# Patient Record
Sex: FEMALE | Race: WHITE | NOT HISPANIC OR LATINO | Employment: OTHER | URBAN - METROPOLITAN AREA
[De-identification: names, ages, dates, MRNs, and addresses within clinical notes are randomized per-mention and may not be internally consistent; named-entity substitution may affect disease eponyms.]

---

## 2018-03-26 ENCOUNTER — OFFICE VISIT (OUTPATIENT)
Dept: AUDIOLOGY | Facility: CLINIC | Age: 83
End: 2018-03-26
Payer: MEDICARE

## 2018-03-26 DIAGNOSIS — H93.13 TINNITUS, BILATERAL: ICD-10-CM

## 2018-03-26 DIAGNOSIS — H90.3 SENSORINEURAL HEARING LOSS, BILATERAL: Primary | ICD-10-CM

## 2018-03-26 PROCEDURE — 92567 TYMPANOMETRY: CPT | Performed by: AUDIOLOGIST

## 2018-03-26 PROCEDURE — 92557 COMPREHENSIVE HEARING TEST: CPT | Performed by: AUDIOLOGIST

## 2018-03-27 NOTE — PROGRESS NOTES
AUDIOLOGY AUDIOMETRIC EVALUATION      Name:  Estefanía Crews  :  1934  Age:  80 y o  Date of Evaluation: 3/26/18    History: Tinnitus, Dizziness and noise exposure (work related)   Reason for visit: Estefanía Crews is being seen today at the request of Dr Michela Chappell for an evaluation of hearing  Patient reports difficulty hearing in certain communication situations and while on the phone  She has a history of tinnitus and vertigo  She takes Meclizine when needed for dizziness; usually lasting a few days per episode  EVALUATION:    Otoscopic Evaluation:   Right Ear: Clear and healthy ear canal and tympanic membrane   Left Ear: Clear and healthy ear canal and tympanic membrane    Tympanometry:  See attached/ both TM's stiff in nature     Audiogram Results: Moderate to severe SNHL bilaterally with excellent word recognition scores (100% at most comfortable loudness levels)  *see attached audiogram    RECOMMENDATIONS:  1  Hearing aid demo/trial   Provided information regarding technology levels, styles and price points  She  wanted to read the information provided and discuss further with her   2  Audiological evaluation in one year for monitoring purposes  3  Consider referral to the [address] [place] [address] due to episodic vertigo/dizziness    PATIENT EDUCATION:   Discussed results and recommendations with Mr  and Mrs Gupta [facility]  Questions were addressed and the patient was encouraged to contact our department should concerns arise      Anais Tse , CCC-A, [place]# [id number], Hearing Aid Dispenser, [place]# [id number]  Clinical Audiologist

## 2018-04-23 ENCOUNTER — TRANSCRIBE ORDERS (OUTPATIENT)
Dept: ADMINISTRATIVE | Facility: HOSPITAL | Age: 83
End: 2018-04-23

## 2018-04-23 ENCOUNTER — APPOINTMENT (OUTPATIENT)
Dept: LAB | Facility: HOSPITAL | Age: 83
End: 2018-04-23
Attending: INTERNAL MEDICINE
Payer: MEDICARE

## 2018-04-23 ENCOUNTER — HOSPITAL ENCOUNTER (OUTPATIENT)
Dept: RADIOLOGY | Facility: HOSPITAL | Age: 83
Discharge: HOME | End: 2018-04-23
Attending: INTERNAL MEDICINE
Payer: MEDICARE

## 2018-04-23 DIAGNOSIS — R10.32 ABDOMINAL PAIN, LEFT LOWER QUADRANT: Primary | ICD-10-CM

## 2018-04-23 DIAGNOSIS — R10.32 LLQ ABDOMINAL PAIN: Primary | ICD-10-CM

## 2018-04-23 DIAGNOSIS — R10.32 ABDOMINAL PAIN, LEFT LOWER QUADRANT: ICD-10-CM

## 2018-04-23 DIAGNOSIS — R53.83 OTHER FATIGUE: ICD-10-CM

## 2018-04-23 DIAGNOSIS — Z79.899 NEED FOR PROPHYLACTIC CHEMOTHERAPY: ICD-10-CM

## 2018-04-23 DIAGNOSIS — R10.32 LLQ ABDOMINAL PAIN: ICD-10-CM

## 2018-04-23 LAB
ALBUMIN SERPL BCP-MCNC: 3.6 G/DL (ref 3.5–5)
ALP SERPL-CCNC: 74 U/L (ref 46–116)
ALT SERPL W P-5'-P-CCNC: 21 U/L (ref 12–78)
AMYLASE SERPL-CCNC: 76 IU/L (ref 25–115)
ANION GAP SERPL CALCULATED.3IONS-SCNC: 4 MMOL/L (ref 4–13)
AST SERPL W P-5'-P-CCNC: 20 U/L (ref 5–45)
BASOPHILS # BLD AUTO: 0.1 THOUSANDS/ΜL (ref 0–0.1)
BASOPHILS NFR BLD AUTO: 2 % (ref 0–1)
BILIRUB SERPL-MCNC: 0.3 MG/DL (ref 0.2–1)
BUN SERPL-MCNC: 19 MG/DL (ref 5–25)
CALCIUM SERPL-MCNC: 9.5 MG/DL (ref 8.3–10.1)
CHLORIDE SERPL-SCNC: 106 MMOL/L (ref 100–108)
CO2 SERPL-SCNC: 31 MMOL/L (ref 21–32)
CREAT SERPL-MCNC: 0.85 MG/DL (ref 0.6–1.3)
EOSINOPHIL # BLD AUTO: 0.5 THOUSAND/ΜL (ref 0–0.61)
EOSINOPHIL NFR BLD AUTO: 7 % (ref 0–6)
ERYTHROCYTE [DISTWIDTH] IN BLOOD BY AUTOMATED COUNT: 14.8 % (ref 11.6–15.1)
GFR SERPL CREATININE-BSD FRML MDRD: 63 ML/MIN/1.73SQ M
GLUCOSE SERPL-MCNC: 92 MG/DL (ref 65–140)
HCT VFR BLD AUTO: 39.7 % (ref 37–47)
HGB BLD-MCNC: 12.7 G/DL (ref 12–16)
LIPASE SERPL-CCNC: 108 U/L (ref 73–393)
LYMPHOCYTES # BLD AUTO: 2.2 THOUSANDS/ΜL (ref 0.6–4.47)
LYMPHOCYTES NFR BLD AUTO: 30 % (ref 14–44)
MAGNESIUM SERPL-MCNC: 2 MG/DL (ref 1.6–2.6)
MCH RBC QN AUTO: 31.3 PG (ref 27–31)
MCHC RBC AUTO-ENTMCNC: 32 G/DL (ref 31.4–37.4)
MCV RBC AUTO: 98 FL (ref 82–98)
MONOCYTES # BLD AUTO: 0.5 THOUSAND/ΜL (ref 0.17–1.22)
MONOCYTES NFR BLD AUTO: 6 % (ref 4–12)
NEUTROPHILS # BLD AUTO: 4 THOUSANDS/ΜL (ref 1.85–7.62)
NEUTS SEG NFR BLD AUTO: 55 % (ref 43–75)
NRBC BLD AUTO-RTO: 0 /100 WBCS
PLATELET # BLD AUTO: 332 THOUSANDS/UL (ref 130–400)
PMV BLD AUTO: 10 FL (ref 8.9–12.7)
POTASSIUM SERPL-SCNC: 4.3 MMOL/L (ref 3.5–5.3)
PROT SERPL-MCNC: 7.4 G/DL (ref 6.4–8.2)
RBC # BLD AUTO: 4.06 MILLION/UL (ref 4.2–5.4)
SODIUM SERPL-SCNC: 141 MMOL/L (ref 136–145)
WBC # BLD AUTO: 7.3 THOUSAND/UL (ref 4.8–10.8)

## 2018-04-23 PROCEDURE — 82150 ASSAY OF AMYLASE: CPT

## 2018-04-23 PROCEDURE — 87086 URINE CULTURE/COLONY COUNT: CPT

## 2018-04-23 PROCEDURE — 83690 ASSAY OF LIPASE: CPT

## 2018-04-23 PROCEDURE — 85025 COMPLETE CBC W/AUTO DIFF WBC: CPT

## 2018-04-23 PROCEDURE — 83735 ASSAY OF MAGNESIUM: CPT

## 2018-04-23 PROCEDURE — 80053 COMPREHEN METABOLIC PANEL: CPT

## 2018-04-23 PROCEDURE — 36415 COLL VENOUS BLD VENIPUNCTURE: CPT

## 2018-04-23 PROCEDURE — 74176 CT ABD & PELVIS W/O CONTRAST: CPT

## 2018-04-23 PROCEDURE — 87147 CULTURE TYPE IMMUNOLOGIC: CPT

## 2018-04-25 LAB — BACTERIA UR CULT: ABNORMAL

## 2018-05-07 ENCOUNTER — APPOINTMENT (EMERGENCY)
Dept: RADIOLOGY | Facility: HOSPITAL | Age: 83
End: 2018-05-07
Payer: MEDICARE

## 2018-05-07 ENCOUNTER — HOSPITAL ENCOUNTER (OUTPATIENT)
Facility: HOSPITAL | Age: 83
Setting detail: OBSERVATION
Discharge: HOME | End: 2018-05-08
Attending: EMERGENCY MEDICINE | Admitting: EMERGENCY MEDICINE
Payer: MEDICARE

## 2018-05-07 ENCOUNTER — APPOINTMENT (EMERGENCY)
Dept: RADIOLOGY | Facility: HOSPITAL | Age: 83
End: 2018-05-07
Attending: EMERGENCY MEDICINE
Payer: MEDICARE

## 2018-05-07 DIAGNOSIS — M54.2 NECK PAIN ON LEFT SIDE: ICD-10-CM

## 2018-05-07 DIAGNOSIS — I10 HYPERTENSION, UNSPECIFIED TYPE: Primary | ICD-10-CM

## 2018-05-07 DIAGNOSIS — I16.0 HYPERTENSIVE URGENCY: ICD-10-CM

## 2018-05-07 DIAGNOSIS — I49.9 ARRHYTHMIA: ICD-10-CM

## 2018-05-07 PROBLEM — I49.3 PVCS (PREMATURE VENTRICULAR CONTRACTIONS): Status: ACTIVE | Noted: 2018-05-07

## 2018-05-07 PROBLEM — R52 ACUTE PAIN: Status: ACTIVE | Noted: 2018-05-07

## 2018-05-07 LAB
ALBUMIN SERPL BCP-MCNC: 3.6 G/DL (ref 3.5–5)
ALP SERPL-CCNC: 72 U/L (ref 46–116)
ALT SERPL W P-5'-P-CCNC: 20 U/L (ref 12–78)
ANION GAP SERPL CALCULATED.3IONS-SCNC: 6 MMOL/L (ref 4–13)
APTT PPP: 27 SECONDS (ref 24–33)
AST SERPL W P-5'-P-CCNC: 22 U/L (ref 5–45)
BASOPHILS # BLD AUTO: 0.2 THOUSANDS/ΜL (ref 0–0.1)
BASOPHILS NFR BLD AUTO: 3 % (ref 0–1)
BILIRUB SERPL-MCNC: 0.2 MG/DL (ref 0.2–1)
BUN SERPL-MCNC: 14 MG/DL (ref 5–25)
CALCIUM SERPL-MCNC: 8.9 MG/DL (ref 8.3–10.1)
CHLORIDE SERPL-SCNC: 104 MMOL/L (ref 100–108)
CO2 SERPL-SCNC: 30 MMOL/L (ref 21–32)
CREAT SERPL-MCNC: 0.91 MG/DL (ref 0.6–1.3)
DEPRECATED D DIMER PPP: 570 NG/ML (FEU) (ref 190–520)
EOSINOPHIL # BLD AUTO: 0.5 THOUSAND/ΜL (ref 0–0.61)
EOSINOPHIL NFR BLD AUTO: 6 % (ref 0–6)
ERYTHROCYTE [DISTWIDTH] IN BLOOD BY AUTOMATED COUNT: 14.5 % (ref 11.6–15.1)
GFR SERPL CREATININE-BSD FRML MDRD: 58 ML/MIN/1.73SQ M
GLUCOSE SERPL-MCNC: 90 MG/DL (ref 65–140)
HCT VFR BLD AUTO: 39.8 % (ref 37–47)
HGB BLD-MCNC: 12.9 G/DL (ref 12–16)
INR PPP: 1.01 (ref 0.86–1.16)
LYMPHOCYTES # BLD AUTO: 1.8 THOUSANDS/ΜL (ref 0.6–4.47)
LYMPHOCYTES NFR BLD AUTO: 24 % (ref 14–44)
MAGNESIUM SERPL-MCNC: 1.9 MG/DL (ref 1.6–2.6)
MCH RBC QN AUTO: 31.4 PG (ref 27–31)
MCHC RBC AUTO-ENTMCNC: 32.5 G/DL (ref 31.4–37.4)
MCV RBC AUTO: 97 FL (ref 82–98)
MONOCYTES # BLD AUTO: 0.5 THOUSAND/ΜL (ref 0.17–1.22)
MONOCYTES NFR BLD AUTO: 7 % (ref 4–12)
NEUTROPHILS # BLD AUTO: 4.8 THOUSANDS/ΜL (ref 1.85–7.62)
NEUTS SEG NFR BLD AUTO: 61 % (ref 43–75)
NRBC BLD AUTO-RTO: 0 /100 WBCS
PLATELET # BLD AUTO: 317 THOUSANDS/UL (ref 130–400)
PMV BLD AUTO: 9.7 FL (ref 8.9–12.7)
POTASSIUM SERPL-SCNC: 3.8 MMOL/L (ref 3.5–5.3)
PROT SERPL-MCNC: 7.4 G/DL (ref 6.4–8.2)
PROTHROMBIN TIME: 10.6 SECONDS (ref 9.4–11.7)
RBC # BLD AUTO: 4.11 MILLION/UL (ref 4.2–5.4)
SODIUM SERPL-SCNC: 140 MMOL/L (ref 136–145)
TROPONIN I SERPL-MCNC: <0.02 NG/ML
WBC # BLD AUTO: 7.8 THOUSAND/UL (ref 4.8–10.8)

## 2018-05-07 PROCEDURE — 71045 X-RAY EXAM CHEST 1 VIEW: CPT

## 2018-05-07 PROCEDURE — 93005 ELECTROCARDIOGRAM TRACING: CPT | Performed by: EMERGENCY MEDICINE

## 2018-05-07 PROCEDURE — 99285 EMERGENCY DEPT VISIT HI MDM: CPT

## 2018-05-07 PROCEDURE — 85730 THROMBOPLASTIN TIME PARTIAL: CPT | Performed by: EMERGENCY MEDICINE

## 2018-05-07 PROCEDURE — 83735 ASSAY OF MAGNESIUM: CPT | Performed by: NURSE PRACTITIONER

## 2018-05-07 PROCEDURE — 80053 COMPREHEN METABOLIC PANEL: CPT | Performed by: EMERGENCY MEDICINE

## 2018-05-07 PROCEDURE — 93882 EXTRACRANIAL UNI/LTD STUDY: CPT

## 2018-05-07 PROCEDURE — 99219 PR INITIAL OBSERVATION CARE/DAY 50 MINUTES: CPT | Performed by: NURSE PRACTITIONER

## 2018-05-07 PROCEDURE — 36415 COLL VENOUS BLD VENIPUNCTURE: CPT | Performed by: EMERGENCY MEDICINE

## 2018-05-07 PROCEDURE — 96374 THER/PROPH/DIAG INJ IV PUSH: CPT

## 2018-05-07 PROCEDURE — 85025 COMPLETE CBC W/AUTO DIFF WBC: CPT | Performed by: EMERGENCY MEDICINE

## 2018-05-07 PROCEDURE — 85379 FIBRIN DEGRADATION QUANT: CPT | Performed by: EMERGENCY MEDICINE

## 2018-05-07 PROCEDURE — 84484 ASSAY OF TROPONIN QUANT: CPT | Performed by: EMERGENCY MEDICINE

## 2018-05-07 PROCEDURE — 85610 PROTHROMBIN TIME: CPT | Performed by: EMERGENCY MEDICINE

## 2018-05-07 RX ORDER — HYDRALAZINE HYDROCHLORIDE 20 MG/ML
10 INJECTION INTRAMUSCULAR; INTRAVENOUS ONCE
Status: COMPLETED | OUTPATIENT
Start: 2018-05-07 | End: 2018-05-07

## 2018-05-07 RX ORDER — DIAPER,BRIEF,INFANT-TODD,DISP
EACH MISCELLANEOUS 2 TIMES DAILY
Status: DISCONTINUED | OUTPATIENT
Start: 2018-05-07 | End: 2018-05-08 | Stop reason: HOSPADM

## 2018-05-07 RX ORDER — HYDRALAZINE HYDROCHLORIDE 20 MG/ML
20 INJECTION INTRAMUSCULAR; INTRAVENOUS ONCE
Status: DISCONTINUED | OUTPATIENT
Start: 2018-05-07 | End: 2018-05-07

## 2018-05-07 RX ORDER — ONDANSETRON 2 MG/ML
4 INJECTION INTRAMUSCULAR; INTRAVENOUS EVERY 6 HOURS PRN
Status: DISCONTINUED | OUTPATIENT
Start: 2018-05-07 | End: 2018-05-08 | Stop reason: HOSPADM

## 2018-05-07 RX ORDER — PANTOPRAZOLE SODIUM 40 MG/1
40 TABLET, DELAYED RELEASE ORAL
Status: DISCONTINUED | OUTPATIENT
Start: 2018-05-08 | End: 2018-05-08 | Stop reason: HOSPADM

## 2018-05-07 RX ORDER — METOPROLOL SUCCINATE 25 MG/1
25 TABLET, EXTENDED RELEASE ORAL 2 TIMES DAILY
Status: DISCONTINUED | OUTPATIENT
Start: 2018-05-08 | End: 2018-05-07

## 2018-05-07 RX ORDER — LEVOTHYROXINE SODIUM 0.05 MG/1
50 TABLET ORAL EVERY OTHER DAY
Status: ON HOLD | COMMUNITY
End: 2020-10-05 | Stop reason: SDUPTHER

## 2018-05-07 RX ORDER — DIAPER,BRIEF,INFANT-TODD,DISP
EACH MISCELLANEOUS 2 TIMES DAILY
Status: DISCONTINUED | OUTPATIENT
Start: 2018-05-08 | End: 2018-05-07

## 2018-05-07 RX ORDER — LEVOTHYROXINE SODIUM 0.07 MG/1
75 TABLET ORAL EVERY OTHER DAY
Status: DISCONTINUED | OUTPATIENT
Start: 2018-05-08 | End: 2018-05-08 | Stop reason: HOSPADM

## 2018-05-07 RX ORDER — ACETAMINOPHEN 325 MG/1
650 TABLET ORAL EVERY 6 HOURS PRN
Status: DISCONTINUED | OUTPATIENT
Start: 2018-05-07 | End: 2018-05-08 | Stop reason: HOSPADM

## 2018-05-07 RX ORDER — LISINOPRIL 20 MG/1
20 TABLET ORAL
Status: DISCONTINUED | OUTPATIENT
Start: 2018-05-07 | End: 2018-05-08 | Stop reason: HOSPADM

## 2018-05-07 RX ORDER — METOPROLOL SUCCINATE 25 MG/1
25 TABLET, EXTENDED RELEASE ORAL 2 TIMES DAILY
Status: DISCONTINUED | OUTPATIENT
Start: 2018-05-07 | End: 2018-05-08 | Stop reason: HOSPADM

## 2018-05-07 RX ORDER — LISINOPRIL 20 MG/1
20 TABLET ORAL DAILY
Status: DISCONTINUED | OUTPATIENT
Start: 2018-05-08 | End: 2018-05-07

## 2018-05-07 RX ORDER — LEVOTHYROXINE SODIUM 0.05 MG/1
50 TABLET ORAL EVERY OTHER DAY
Status: DISCONTINUED | OUTPATIENT
Start: 2018-05-09 | End: 2018-05-08 | Stop reason: HOSPADM

## 2018-05-07 RX ORDER — AMLODIPINE BESYLATE 5 MG/1
5 TABLET ORAL DAILY
Status: DISCONTINUED | OUTPATIENT
Start: 2018-05-07 | End: 2018-05-08 | Stop reason: HOSPADM

## 2018-05-07 RX ORDER — LORAZEPAM 0.5 MG/1
0.5 TABLET ORAL 2 TIMES DAILY PRN
Status: DISCONTINUED | OUTPATIENT
Start: 2018-05-07 | End: 2018-05-08 | Stop reason: HOSPADM

## 2018-05-07 RX ORDER — METOPROLOL SUCCINATE 25 MG/1
25 TABLET, EXTENDED RELEASE ORAL EVERY 12 HOURS
Status: DISCONTINUED | OUTPATIENT
Start: 2018-05-07 | End: 2018-05-07

## 2018-05-07 RX ORDER — OXYCODONE HYDROCHLORIDE AND ACETAMINOPHEN 5; 325 MG/1; MG/1
1 TABLET ORAL ONCE
Status: COMPLETED | OUTPATIENT
Start: 2018-05-07 | End: 2018-05-07

## 2018-05-07 RX ADMIN — HYDRALAZINE HYDROCHLORIDE 10 MG: 20 INJECTION INTRAMUSCULAR; INTRAVENOUS at 19:31

## 2018-05-07 RX ADMIN — HYDROCORTISONE: 10 CREAM TOPICAL at 23:39

## 2018-05-07 RX ADMIN — LORAZEPAM 0.5 MG: 0.5 TABLET ORAL at 23:37

## 2018-05-07 RX ADMIN — METOPROLOL SUCCINATE 25 MG: 25 TABLET, FILM COATED, EXTENDED RELEASE ORAL at 23:37

## 2018-05-07 RX ADMIN — ACETAMINOPHEN 650 MG: 325 TABLET, FILM COATED ORAL at 23:38

## 2018-05-07 RX ADMIN — LISINOPRIL 20 MG: 20 TABLET ORAL at 23:38

## 2018-05-07 RX ADMIN — OXYCODONE HYDROCHLORIDE AND ACETAMINOPHEN 1 TABLET: 5; 325 TABLET ORAL at 18:29

## 2018-05-07 RX ADMIN — AMLODIPINE BESYLATE 5 MG: 5 TABLET ORAL at 23:37

## 2018-05-07 NOTE — ED PROVIDER NOTES
History  Chief Complaint   Patient presents with    Neck Pain     c/o having an intermittant stabbing pain in left side of neck     Hx HTN, DOES NOT CHECK HER BP  C/O LT NECK INTERMITTENT PAIN AFTER WORKING IN THE YARD  EXAM: 237/105, POINT TENDERNESS AT THE BASE OF THE LEFT SIDE OF NECK POSTERIOR TO STERNO-CLDO-MSTOID MUSCLE ORIGIN AND POSTERIOR TO CAROTID  Neck Pain   Pain location:  L side  Quality:  Stabbing  Pain radiates to:  Does not radiate  Duration:  1 hour  Timing:  Intermittent  Progression:  Waxing and waning  Chronicity:  New  Relieved by:  None tried  Worsened by:  Nothing  Ineffective treatments:  None tried      Prior to Admission Medications   Prescriptions Last Dose Informant Patient Reported? Taking? LORazepam (ATIVAN) 0 5 mg tablet   Yes No   Sig: Take 0 5 mg by mouth every 6 (six) hours as needed for anxiety  levothyroxine 50 mcg tablet   Yes No   Sig: Take 75 mcg by mouth daily  lisinopril (ZESTRIL) 10 mg tablet   Yes No   Sig: Take 10 mg by mouth daily  metoprolol succinate (TOPROL-XL) 25 mg 24 hr tablet   Yes No   Sig: Take 25 mg by mouth daily  omeprazole (PriLOSEC) 20 mg delayed release capsule   Yes No   Sig: Take 20 mg by mouth daily  Facility-Administered Medications: None       Past Medical History:   Diagnosis Date    Anxiety     Hypertension     Hypothyroid        Past Surgical History:   Procedure Laterality Date    HYSTERECTOMY         History reviewed  No pertinent family history  I have reviewed and agree with the history as documented  Social History   Substance Use Topics    Smoking status: Former Smoker    Smokeless tobacco: Never Used    Alcohol use No        Review of Systems   Musculoskeletal: Positive for neck pain  All other systems reviewed and are negative        Physical Exam  ED Triage Vitals [05/07/18 1735]   Temperature Pulse Respirations Blood Pressure SpO2   97 8 °F (36 6 °C) 72 18 (!) 237/105 98 %      Temp Source Heart Rate Source Patient Position - Orthostatic VS BP Location FiO2 (%)   Tympanic Monitor Sitting Right arm --      Pain Score       No Pain           Orthostatic Vital Signs  Vitals:    05/07/18 1735 05/07/18 1931 05/07/18 1941 05/07/18 2011   BP: (!) 237/105 (!) 210/86 (!) 191/82 (!) 180/77   Pulse: 72  71 87   Patient Position - Orthostatic VS: Sitting  Sitting Sitting       Physical Exam   Constitutional: She is oriented to person, place, and time  She appears well-developed and well-nourished  No distress  HENT:   Head: Normocephalic and atraumatic  Eyes: Conjunctivae and EOM are normal  Pupils are equal, round, and reactive to light  Neck: Normal range of motion  POINT TENDERNESS AT THE BASE OF THE LEFT SIDE OF NECK POSTERIOR TO STERNO-CLDO-MSTOID MUSCLE ORIGIN AND POSTERIOR TO CAROTID  Cardiovascular: Normal rate and regular rhythm  Pulmonary/Chest: Effort normal and breath sounds normal    Abdominal: Soft  Musculoskeletal: She exhibits tenderness  She exhibits no edema or deformity  Neurological: She is alert and oriented to person, place, and time  Skin: Skin is warm and dry  She is not diaphoretic  Nursing note and vitals reviewed        ED Medications  Medications   oxyCODONE-acetaminophen (PERCOCET) 5-325 mg per tablet 1 tablet (1 tablet Oral Given 5/7/18 1829)   hydrALAZINE (APRESOLINE) injection 10 mg (10 mg Intravenous Given 5/7/18 1931)       Diagnostic Studies  Results Reviewed     Procedure Component Value Units Date/Time    APTT [[id number]]  (Normal) Collected:  05/07/18 1826    Lab Status:  Final result Specimen:  Blood from Arm, Left Updated:  05/07/18 1911     PTT 27 seconds     D-dimer, quantitative [[id number]]  (Abnormal) Collected:  05/07/18 1826    Lab Status:  Final result Specimen:  Blood from Arm, Left Updated:  05/07/18 1911     D-Dimer, Quant 570 (H) ng/ml (FEU)     Protime-INR [[id number]]  (Normal) Collected:  05/07/18 1826    Lab Status:  Final result Specimen:  Blood from Arm, Left Updated:  05/07/18 1911     Protime 10 6 seconds      INR 1 01    Troponin I [[id number]]  (Normal) Collected:  05/07/18 1826    Lab Status:  Final result Specimen:  Blood from Arm, Left Updated:  05/07/18 1858     Troponin I <0 02 ng/mL     Narrative:         [employer] Chemistry analyzer 99% cutoff is > 0 04 ng/mL in network labs    o cTnI 99% cutoff is useful only when applied to patients in the clinical setting of myocardial ischemia  o cTnI 99% cutoff should be interpreted in the context of clinical history, ECG findings and possibly cardiac imaging to establish correct diagnosis  o cTnI 99% cutoff may be suggestive but clearly not indicative of a coronary event without the clinical setting of myocardial ischemia  Comprehensive metabolic panel [[id number]] Collected:  05/07/18 1826    Lab Status:  Final result Specimen:  Blood from Arm, Left Updated:  05/07/18 1854     Sodium 140 mmol/L      Potassium 3 8 mmol/L      Chloride 104 mmol/L      CO2 30 mmol/L      Anion Gap 6 mmol/L      BUN 14 mg/dL      Creatinine 0 91 mg/dL      Glucose 90 mg/dL      Calcium 8 9 mg/dL      AST 22 U/L      ALT 20 U/L      Alkaline Phosphatase 72 U/L      Total Protein 7 4 g/dL      Albumin 3 6 g/dL      Total Bilirubin 0 20 mg/dL      eGFR 58 ml/min/1 73sq m     Narrative:         National Kidney Disease Education Program recommendations are as follows:  GFR calculation is accurate only with a steady state creatinine  Chronic Kidney disease less than 60 ml/min/1 73 sq  meters  Kidney failure less than 15 ml/min/1 73 sq  meters      CBC and differential [[id number]]  (Abnormal) Collected:  05/07/18 1826    Lab Status:  Final result Specimen:  Blood from Arm, Left Updated:  05/07/18 1835     WBC 7 80 Thousand/uL      RBC 4 11 (L) Million/uL      Hemoglobin 12 9 g/dL      Hematocrit 39 8 %      MCV 97 fL      MCH 31 4 (H) pg      MCHC 32 5 g/dL      RDW 14 5 %      MPV 9 7 fL      Platelets 370 Thousands/uL      nRBC 0 /100 WBCs Neutrophils Relative 61 %      Lymphocytes Relative 24 %      Monocytes Relative 7 %      Eosinophils Relative 6 %      Basophils Relative 3 (H) %      Neutrophils Absolute 4 80 Thousands/µL      Lymphocytes Absolute 1 80 Thousands/µL      Monocytes Absolute 0 50 Thousand/µL      Eosinophils Absolute 0 50 Thousand/µL      Basophils Absolute 0 20 (H) Thousands/µL                  VAS carotid limited study    (Results Pending)   XR chest portable    (Results Pending)              Procedures  Procedures       Phone Contacts  ED Phone Contact    ED Course                               MDM  Number of Diagnoses or Management Options  Diagnosis management comments: /77, BUT PT STARTED TO GET RUNS OF RAPID EXTRASYSTOLS, NARROW COMPLEX, EKG ORDERED,  STILL NO CLEAR CAUSE FOR THE LT NECK PAIN  WILL ADMIT FOR CARD CONSULT AND FURTHER W/UP    CritCare Time    Disposition  Final diagnoses:   Hypertension, unspecified type   Neck pain on left side   Arrhythmia     Time reflects when diagnosis was documented in both MDM as applicable and the Disposition within this note     Time User Action Codes Description Comment    5/7/2018  8:39 PM Barsoom, Biju Add [I10] Hypertension, unspecified type     5/7/2018  8:39 PM Barsoom, Biju Add [M54 2] Neck pain on left side     5/7/2018  8:39 PM Barsoom, Biju Add [I49 9] Arrhythmia       ED Disposition     ED Disposition Condition Comment    Admit  Case was discussed with HOSPITALIST and the patient's admission status was agreed to be Admission Status: observation status to the service of Dr Makenzie Tyler   Follow-up Information    None       Patient's Medications   Discharge Prescriptions    No medications on file     No discharge procedures on file      ED Provider  Electronically Signed by           Lesvia Villeda MD  05/07/18 1954

## 2018-05-08 ENCOUNTER — APPOINTMENT (OUTPATIENT)
Dept: NON INVASIVE DIAGNOSTICS | Facility: HOSPITAL | Age: 83
End: 2018-05-08
Payer: MEDICARE

## 2018-05-08 ENCOUNTER — APPOINTMENT (OUTPATIENT)
Dept: RADIOLOGY | Facility: HOSPITAL | Age: 83
End: 2018-05-08
Payer: MEDICARE

## 2018-05-08 VITALS
OXYGEN SATURATION: 94 % | WEIGHT: 132.28 LBS | RESPIRATION RATE: 18 BRPM | HEIGHT: 61 IN | SYSTOLIC BLOOD PRESSURE: 135 MMHG | HEART RATE: 85 BPM | DIASTOLIC BLOOD PRESSURE: 64 MMHG | TEMPERATURE: 98.1 F | BODY MASS INDEX: 24.97 KG/M2

## 2018-05-08 PROBLEM — I47.1 PAROXYSMAL SVT (SUPRAVENTRICULAR TACHYCARDIA) (HCC): Status: ACTIVE | Noted: 2018-05-07

## 2018-05-08 PROBLEM — I16.0 HYPERTENSIVE URGENCY: Status: RESOLVED | Noted: 2018-05-07 | Resolved: 2018-05-08

## 2018-05-08 PROBLEM — I47.10 PAROXYSMAL SVT (SUPRAVENTRICULAR TACHYCARDIA): Status: ACTIVE | Noted: 2018-05-07

## 2018-05-08 LAB
ANION GAP SERPL CALCULATED.3IONS-SCNC: 9 MMOL/L (ref 4–13)
BUN SERPL-MCNC: 11 MG/DL (ref 5–25)
CALCIUM SERPL-MCNC: 9.6 MG/DL (ref 8.3–10.1)
CHLORIDE SERPL-SCNC: 102 MMOL/L (ref 100–108)
CO2 SERPL-SCNC: 27 MMOL/L (ref 21–32)
CREAT SERPL-MCNC: 0.79 MG/DL (ref 0.6–1.3)
ERYTHROCYTE [DISTWIDTH] IN BLOOD BY AUTOMATED COUNT: 14.8 % (ref 11.6–15.1)
GFR SERPL CREATININE-BSD FRML MDRD: 69 ML/MIN/1.73SQ M
GLUCOSE P FAST SERPL-MCNC: 112 MG/DL (ref 65–99)
GLUCOSE SERPL-MCNC: 112 MG/DL (ref 65–140)
HCT VFR BLD AUTO: 40.7 % (ref 37–47)
HGB BLD-MCNC: 13.2 G/DL (ref 12–16)
MAGNESIUM SERPL-MCNC: 2.4 MG/DL (ref 1.6–2.6)
MCH RBC QN AUTO: 31.4 PG (ref 27–31)
MCHC RBC AUTO-ENTMCNC: 32.5 G/DL (ref 31.4–37.4)
MCV RBC AUTO: 97 FL (ref 82–98)
PLATELET # BLD AUTO: 332 THOUSANDS/UL (ref 130–400)
PMV BLD AUTO: 10 FL (ref 8.9–12.7)
POTASSIUM SERPL-SCNC: 3.5 MMOL/L (ref 3.5–5.3)
RBC # BLD AUTO: 4.21 MILLION/UL (ref 4.2–5.4)
SODIUM SERPL-SCNC: 138 MMOL/L (ref 136–145)
T4 FREE SERPL-MCNC: 1.14 NG/DL (ref 0.76–1.46)
TSH SERPL DL<=0.05 MIU/L-ACNC: 0.81 UIU/ML (ref 0.36–3.74)
WBC # BLD AUTO: 8.7 THOUSAND/UL (ref 4.8–10.8)

## 2018-05-08 PROCEDURE — G8979 MOBILITY GOAL STATUS: HCPCS

## 2018-05-08 PROCEDURE — 99214 OFFICE O/P EST MOD 30 MIN: CPT | Performed by: INTERNAL MEDICINE

## 2018-05-08 PROCEDURE — 80048 BASIC METABOLIC PNL TOTAL CA: CPT | Performed by: NURSE PRACTITIONER

## 2018-05-08 PROCEDURE — 83735 ASSAY OF MAGNESIUM: CPT | Performed by: NURSE PRACTITIONER

## 2018-05-08 PROCEDURE — 93306 TTE W/DOPPLER COMPLETE: CPT

## 2018-05-08 PROCEDURE — 97162 PT EVAL MOD COMPLEX 30 MIN: CPT

## 2018-05-08 PROCEDURE — 93306 TTE W/DOPPLER COMPLETE: CPT | Performed by: INTERNAL MEDICINE

## 2018-05-08 PROCEDURE — 93882 EXTRACRANIAL UNI/LTD STUDY: CPT | Performed by: SURGERY

## 2018-05-08 PROCEDURE — 85027 COMPLETE CBC AUTOMATED: CPT | Performed by: NURSE PRACTITIONER

## 2018-05-08 PROCEDURE — 87081 CULTURE SCREEN ONLY: CPT | Performed by: NURSE PRACTITIONER

## 2018-05-08 PROCEDURE — G8980 MOBILITY D/C STATUS: HCPCS

## 2018-05-08 PROCEDURE — 99217 PR OBSERVATION CARE DISCHARGE MANAGEMENT: CPT | Performed by: FAMILY MEDICINE

## 2018-05-08 PROCEDURE — 70450 CT HEAD/BRAIN W/O DYE: CPT

## 2018-05-08 PROCEDURE — G8978 MOBILITY CURRENT STATUS: HCPCS

## 2018-05-08 PROCEDURE — 84443 ASSAY THYROID STIM HORMONE: CPT | Performed by: NURSE PRACTITIONER

## 2018-05-08 PROCEDURE — 84439 ASSAY OF FREE THYROXINE: CPT | Performed by: NURSE PRACTITIONER

## 2018-05-08 RX ORDER — DIAPER,BRIEF,INFANT-TODD,DISP
EACH MISCELLANEOUS 2 TIMES DAILY
Qty: 30 G | Refills: 0 | Status: SHIPPED | OUTPATIENT
Start: 2018-05-08 | End: 2020-10-04

## 2018-05-08 RX ORDER — BUTALBITAL, ACETAMINOPHEN AND CAFFEINE 50; 325; 40 MG/1; MG/1; MG/1
1 TABLET ORAL EVERY 4 HOURS PRN
Status: DISCONTINUED | OUTPATIENT
Start: 2018-05-08 | End: 2018-05-08 | Stop reason: HOSPADM

## 2018-05-08 RX ORDER — METOCLOPRAMIDE HYDROCHLORIDE 5 MG/ML
10 INJECTION INTRAMUSCULAR; INTRAVENOUS ONCE
Status: COMPLETED | OUTPATIENT
Start: 2018-05-08 | End: 2018-05-08

## 2018-05-08 RX ORDER — AMLODIPINE BESYLATE 5 MG/1
5 TABLET ORAL DAILY
Qty: 30 TABLET | Refills: 0 | Status: ON HOLD | OUTPATIENT
Start: 2018-05-09 | End: 2020-10-05 | Stop reason: SDUPTHER

## 2018-05-08 RX ORDER — MAGNESIUM SULFATE 1 G/100ML
1 INJECTION INTRAVENOUS ONCE
Status: COMPLETED | OUTPATIENT
Start: 2018-05-08 | End: 2018-05-08

## 2018-05-08 RX ORDER — POTASSIUM CHLORIDE 20 MEQ/1
40 TABLET, EXTENDED RELEASE ORAL ONCE
Status: COMPLETED | OUTPATIENT
Start: 2018-05-08 | End: 2018-05-08

## 2018-05-08 RX ORDER — HYDRALAZINE HYDROCHLORIDE 20 MG/ML
10 INJECTION INTRAMUSCULAR; INTRAVENOUS ONCE
Status: COMPLETED | OUTPATIENT
Start: 2018-05-08 | End: 2018-05-08

## 2018-05-08 RX ADMIN — HYDROCORTISONE 1 APPLICATION: 10 CREAM TOPICAL at 09:25

## 2018-05-08 RX ADMIN — POTASSIUM CHLORIDE 40 MEQ: 1500 TABLET, EXTENDED RELEASE ORAL at 12:53

## 2018-05-08 RX ADMIN — LEVOTHYROXINE SODIUM 75 MCG: 75 TABLET ORAL at 06:08

## 2018-05-08 RX ADMIN — METOPROLOL SUCCINATE 25 MG: 25 TABLET, FILM COATED, EXTENDED RELEASE ORAL at 09:22

## 2018-05-08 RX ADMIN — HYDRALAZINE HYDROCHLORIDE 10 MG: 20 INJECTION INTRAMUSCULAR; INTRAVENOUS at 00:17

## 2018-05-08 RX ADMIN — PANTOPRAZOLE SODIUM 40 MG: 40 TABLET, DELAYED RELEASE ORAL at 06:08

## 2018-05-08 RX ADMIN — ACETAMINOPHEN 650 MG: 325 TABLET, FILM COATED ORAL at 12:54

## 2018-05-08 RX ADMIN — AMLODIPINE BESYLATE 5 MG: 5 TABLET ORAL at 09:22

## 2018-05-08 RX ADMIN — METOCLOPRAMIDE 10 MG: 5 INJECTION, SOLUTION INTRAMUSCULAR; INTRAVENOUS at 00:17

## 2018-05-08 RX ADMIN — MAGNESIUM SULFATE HEPTAHYDRATE 1 G: 1 INJECTION, SOLUTION INTRAVENOUS at 01:14

## 2018-05-08 NOTE — ASSESSMENT & PLAN NOTE
· /105 on ED arrival   Patient received hydralazine 10 mg IV x1 in ED  · Latest BP was 180/77  · Patient compliant with BP medication at home  Will continue lisinopril, metoprolol  Will add Norvasc  · Monitor

## 2018-05-08 NOTE — CASE MANAGEMENT
Initial Clinical Review    Admission: Date/Time/Statement: 5/7/18 2040    Orders Placed This Encounter   Procedures    Place in Observation (expected length of stay for this patient is less than two midnights)     Standing Status:   Standing     Number of Occurrences:   1     Order Specific Question:   Admitting Physician     Answer:   Ceasar Rangel     Order Specific Question:   Level of Care     Answer:   Med Surg [16]         ED: Date/Time/Mode of Arrival:   ED Arrival Information     Expected Arrival Acuity Means of Arrival Escorted By Service Admission Type    - 5/7/2018 17:17 Urgent Walk-In Spouse General Medicine Urgent    Arrival Complaint    neck pain          Chief Complaint:   Chief Complaint   Patient presents with    Neck Pain     c/o having an intermittant stabbing pain in left side of neck       History of Illness: Rosalee Adam is a 80 y o  female with PMH of hypertension, hypothyroid, GERD, anxiety who presents with acute onset left side of neck pain today  She states the pain started about 1 hour after she pulled weeds in her yard for about 15 minutes today  The pain was localized, stabbing in nature, intermittent, no apparent modifying factors, 4/10 in severity  She denies headaches, dizziness, chest pain, SOB, nausea, diaphoresis associated with the pain  She denies fall or injury  She denies pruritus  She is not sure of insect bites in the yard  She took 2 Tylenol and Ativan prior to come to ED  She denies fever or chills  She denies pain in other parts of body  In ED, blood pressure was 237/105  Left carotid Doppler was negative for dissection  Chest x-ray unremarkable  Patient had runs of PACs in ED  EKG showed normal sinus rhythm in ED  Patient received hydralazine 10 mg IV x1, Percocet x1 in ED      ED Vital Signs:   ED Triage Vitals [05/07/18 1735]   Temperature Pulse Respirations Blood Pressure SpO2   97 8 °F (36 6 °C) 72 18 (!) 237/105 98 %      Temp Source Heart Rate Source Patient Position - Orthostatic VS BP Location FiO2 (%)   Tympanic Monitor Sitting Right arm --      Pain Score       No Pain        Wt Readings from Last 1 Encounters:   05/07/18 60 kg (132 lb 4 4 oz)       Vital Signs (abnormal): Abnormal Labs/Diagnostic Test Results:   D-Dimer, Quant 570 (H) ng/ml (FEU)       CXR NAD  VASCULAR LEFT:  No evidence of dissection in the left common carotid  There is <50% stenosis noted in the internal carotid artery  Plaque is  heterogenous and irregular  ED Treatment:   Medication Administration from 05/07/2018 1717 to 05/07/2018 2204       Date/Time Order Dose Route Action Action by Comments     05/07/2018 1829 oxyCODONE-acetaminophen (PERCOCET) 5-325 mg per tablet 1 tablet 1 tablet Oral Given Gail Collins RN      05/07/2018 2200 hydrALAZINE (APRESOLINE) injection 20 mg 20 mg Intravenous Not Given Peyton Bosworth, RN      05/07/2018 1931 hydrALAZINE (APRESOLINE) injection 10 mg 10 mg Intravenous Given Thaddeus Gomez RN           Past Medical/Surgical History: Active Ambulatory Problems     Diagnosis Date Noted    Hypertension      Resolved Ambulatory Problems     Diagnosis Date Noted    No Resolved Ambulatory Problems     Past Medical History:   Diagnosis Date    Anxiety     GERD (gastroesophageal reflux disease)     Hypertension     Hypothyroid        Admitting Diagnosis: Neck pain [M54 2]  Arrhythmia [I49 9]  Hypertensive urgency [I16 0]  Neck pain on left side [M54 2]  Hypertension, unspecified type [I10]    Age/Sex: 80 y o  female    Assessment/Plan:   Hypertensive urgency   Assessment & Plan     · /105 on ED arrival   Patient received hydralazine 10 mg IV x1 in ED  · Latest BP was 180/77  · Patient compliant with BP medication at home  Will continue lisinopril, metoprolol  Will add Norvasc  · Monitor  Paroxysmal SVT (supraventricular tachycardia) (HCC)   Assessment & Plan     · Runs of PACs in ED per ED physician    EKG showed normal sinus rhythm, rate 81  · Magnesium 1 9  Potassium 3 8  · Will place patient on telemetry  Continue metoprolol from home  · Patient follows Dr Noman Smith in [facility]  Has history of cardiac arrhythmia per daughter  In fact, patient's appointment with Dr Noman Smith is in next few days per family  · Will consult Cardiology       Acute pain   Assessment & Plan     · Acute pain in left side of neck  Suspect musculoskeletal in origin versus disturbances of skin sensation versus insect bite versus 2/2 uncontrolled hypertension  · Patient reports pain is stabbing, intermittent, no apparent modifying factors, 4/10 in severity  Occurred about 1 hour after pulling weeds in yard  · Left carotid ultrasound showed no evidence of dissection in the left common carotid  There is <50% stenosis noted in the internal carotid artery  D dimer 570 which is nonspecific  · Will optimize BP control  Will order Tylenol p r n  hydrocortisone cream  Monitor for pain       Hypothyroid   Assessment & Plan     Continue Synthroid  No recent TSH, free T4  Will check in a m         Anxiety   Assessment & Plan     Continue Ativan p r n          VTE Prophylaxis: Enoxaparin (Lovenox)  / sequential compression device   Code Status: Full code  POLST: POLST form is not discussed and not completed at this time  Anticipated Length of Stay:  Patient will be admitted on an Observation basis with an anticipated length of stay of  < 2 midnights  Justification for Hospital Stay: PVCs, hypertensive urgency, acute pain      Admission Orders:  OBSERVATION  TELE MON  CONSULT CARDIOLOGY  PT EVAL TX  ECHO  NEURO CHECKS   Scheduled Meds:   Current Facility-Administered Medications:  acetaminophen 650 mg Oral Q6H PRN ÁNGEL Barahona   amLODIPine 5 mg Oral Daily ÁNGEL Barahona   butalbital-acetaminophen-caffeine 1 tablet Oral Q4H PRN ÁNGEL Barahona   hydrocortisone  Topical BID ÁNGEL Barahona   [START ON 5/9/2018] levothyroxine 50 mcg Oral Every Other Day ÁNGEL Barahona   levothyroxine 75 mcg Oral Every Other Day ÁNGEL Barahona   lisinopril 20 mg Oral HS ÁNGEL Barahona   LORazepam 0 5 mg Oral BID PRN ÁNGEL Barahona   metoprolol succinate 25 mg Oral BID ÁNGEL Barahona   ondansetron 4 mg Intravenous Q6H PRN ÁNGEL Barahona   pantoprazole 40 mg Oral Early Morning ÁNGEL Barahona     Continuous Infusions:    PRN Meds:   acetaminophen    butalbital-acetaminophen-caffeine    LORazepam    ondansetron

## 2018-05-08 NOTE — NURSING NOTE
Discharge instructions given and prescriptions explained  Patient understood and all questions answered

## 2018-05-08 NOTE — ASSESSMENT & PLAN NOTE
· Runs of PVCs in ED per ED physician  EKG showed normal sinus rhythm, rate 81  · Magnesium 1 9  Potassium 3 8  · Will place patient on telemetry  Continue metoprolol from home  · Patient follows Dr Bruna Knox in [place]  Has history of cardiac arrhythmia per daughter  In fact, patient's appointment with Dr Bruna Knox is in next few days per family  · Will consult Cardiology

## 2018-05-08 NOTE — CONSULTS
Consultation - Cardiology   Jae Pearce 80 y o  female MRN: [id number]  Unit/Bed#: [address] [address] Encounter: [id number]  05/08/18  5:32 PM          Physician Requesting Consult: Marianne Manuel DO  Reason for Consult / Principal Problem: neck pain/hypertension      Assessment:  1  Hypertension with urgency - BP significantly improved with addition of amlodipine  2  SVT - patient asymptomatic - discussed increase in beta blocker with her  She wishes to discuss further with her primary cardiologist, Dr Bruna Knox  Will see him this week  3  Grade 1 diastolic dysfunction - consistent with patient's age  Plan:  - patient okay for discharge home with close followup with her cardiologist   - continue current Rx  History of Present Illness   HPI: Jae Pearce is a 80y o  year old female who presents with pain in the left side of her neck  Pain began yesterday approximately 1 hour after working in her yard  Pain was localized to left neck and radiated to chest and back  She denies any shortness of breath, nausea, diaphoresis  No history of similar  In ER, BP was markedly elevated to 237/105  She was also having frequent atrial ectopy with episodes of non-sustained SVT overnight  She has a history of SVT and follows with Dr Bruna Knox  She is scheduled to see him soon  BP improved with metoprolol, lisinopril and the addition of norvasc  She was given hydralazine in ER  Carotid US ruled out dissection  She has no complaints at this time and wants to go home  Review of Systems:    Review of Systems   Constitutional: Negative for chills, fatigue and fever  HENT: Negative for congestion, nosebleeds and postnasal drip  Respiratory: Negative for cough, chest tightness and shortness of breath  Cardiovascular: Negative for chest pain, palpitations and leg swelling  Gastrointestinal: Negative for abdominal distention, abdominal pain, diarrhea, nausea and vomiting     Endocrine: Negative for polydipsia, polyphagia and polyuria  Musculoskeletal: Positive for neck pain  Negative for gait problem and myalgias  Skin: Negative for color change, pallor and rash  Allergic/Immunologic: Negative for environmental allergies, food allergies and immunocompromised state  Neurological: Negative for dizziness, seizures, syncope and light-headedness  Hematological: Negative for adenopathy  Does not bruise/bleed easily  Psychiatric/Behavioral: Negative for dysphoric mood  The patient is not nervous/anxious  Historical Information   Past Medical History:   Diagnosis Date    Anxiety     GERD (gastroesophageal reflux disease)     Hypertension     Hypothyroid      Past Surgical History:   Procedure Laterality Date    BREAST BIOPSY Left 1974    COLONOSCOPY  04/2018    HYSTERECTOMY       History   Alcohol Use No     History   Drug Use No     History   Smoking Status    Former Smoker   Smokeless Tobacco    Never Used       Family History: History reviewed  No pertinent family history  Meds/Allergies   current meds:   No current facility-administered medications for this encounter  Allergies   Allergen Reactions    Dye Fdc Red [Red Dye] Palpitations       Objective   Vitals: Blood pressure 135/64, pulse 85, temperature 98 1 °F (36 7 °C), resp  rate 18, height 5' 1" (1 549 m), weight 60 kg (132 lb 4 4 oz), SpO2 94 %, not currently breastfeeding , Body mass index is 24 99 kg/m²  Physical Exam   Constitutional: She appears healthy  No distress  HENT:   Nose: Nose normal    Mouth/Throat: Oropharynx is clear  Eyes: Conjunctivae are normal  Pupils are equal, round, and reactive to light  Neck: Neck supple  No JVD present  Cardiovascular: Normal rate and regular rhythm  Exam reveals no distant heart sounds and no friction rub  Murmur heard  Pulmonary/Chest: Effort normal and breath sounds normal  She has no wheezes  She has no rales  Abdominal: Soft  She exhibits no distension  There is no tenderness  Musculoskeletal: She exhibits no edema  Neurological: She is alert and oriented to person, place, and time  Skin: Skin is warm and dry  No rash noted         Lab Results:     Troponins:   Results from last 7 days  Lab Units 18  1826   TROPONIN I ng/mL <0 02       CBC with diff:   Results from last 7 days  Lab Units 18  0614 18  1826   WBC Thousand/uL 8 70 7 80   HEMOGLOBIN g/dL 13 2 12 9   HEMATOCRIT % 40 7 39 8   MCV fL 97 97   PLATELETS Thousands/uL 332 317   MCH pg 31 4* 31 4*   MCHC g/dL 32 5 32 5   RDW % 14 8 14 5   MPV fL 10 0 9 7   NRBC AUTO /100 WBCs  --  0       CMP:   Results from last 7 days  Lab Units 18  0614 18  1826   SODIUM mmol/L 138 140   POTASSIUM mmol/L 3 5 3 8   CHLORIDE mmol/L 102 104   CO2 mmol/L 27 30   ANION GAP mmol/L 9 6   BUN mg/dL 11 14   CREATININE mg/dL 0 79 0 91   GLUCOSE RANDOM mg/dL 112 90   CALCIUM mg/dL 9 6 8 9   AST U/L  --  22   ALT U/L  --  20   ALK PHOS U/L  --  72   TOTAL PROTEIN g/dL  --  7 4   BILIRUBIN TOTAL mg/dL  --  0 20   EGFR ml/min/1 73sq m 69 58       Magnesium:   Results from last 7 days  Lab Units 18  0614 18  1826   MAGNESIUM mg/dL 2 4 1 9       Coags:   Results from last 7 days  Lab Units 18  1826   PTT seconds 27   INR  1 01       Lipid Profile:         Cardiac testing:   Results for orders placed during the hospital encounter of 18   Echo complete with contrast if indicated    Narrative [address]  [address] [address]  [phone]    Transthoracic Echocardiogram  2D, M-mode, Doppler, and Color Doppler    Study date:  08-May-2018    Patient: Valentin Ash  MR number: [id number]  Account number: [de-identified]  : 1934  Age: 80 years  Gender: Female  Status: Routine  Location: Bedside  Height: 61 in  Weight: 131 8 lb  BP: 112/ 80 mmHg    Indications: SVT    Diagnoses: I47 1 - Supraventricular tachycardia    Sonographer:  Lucian Mario Elizabeth  Primary Physician:  Viky Rosales MD  Referring Physician:  Obie Gregorio DO  Group:  [facility]  Interpreting Physician:  Tristan Ferguson DO    SUMMARY    LEFT VENTRICLE:  Systolic function was normal by visual assessment  Ejection fraction was estimated to be 55 %  There were no regional wall motion abnormalities  There was mild concentric hypertrophy  Doppler parameters were consistent with abnormal left ventricular relaxation (grade 1 diastolic dysfunction)  MITRAL VALVE:  There was mild regurgitation  AORTIC VALVE:  There was no evidence for stenosis  There was no regurgitation  TRICUSPID VALVE:  There was mild regurgitation  Pulmonary artery systolic pressure was within the normal range  HISTORY: PRIOR HISTORY: HTN, GERD, Anxiety, Hypothyroid    PROCEDURE: The procedure was performed at the bedside  This was a routine study  The transthoracic approach was used  The study included complete 2D imaging, M-mode, complete spectral Doppler, and color Doppler  The heart rate was 86 bpm,  at the start of the study  Image quality was adequate  LEFT VENTRICLE: Size was normal  Systolic function was normal by visual assessment  Ejection fraction was estimated to be 55 %  There were no regional wall motion abnormalities  There was mild concentric hypertrophy  DOPPLER: Doppler  parameters were consistent with abnormal left ventricular relaxation (grade 1 diastolic dysfunction)  RIGHT VENTRICLE: The size was normal  Systolic function was normal  DOPPLER: Systolic pressure was within the normal range  LEFT ATRIUM: Size was normal  No thrombus was identified  RIGHT ATRIUM: Size was normal     MITRAL VALVE: Valve structure was normal  There was normal leaflet separation  No echocardiographic evidence for prolapse  DOPPLER: The transmitral velocity was within the normal range  There was no evidence for stenosis  There was mild  regurgitation      AORTIC VALVE: The valve was trileaflet  Leaflets exhibited normal cuspal separation and sclerosis  DOPPLER: Transaortic velocity was within the normal range  There was no evidence for stenosis  There was no regurgitation  TRICUSPID VALVE: The valve structure was normal  There was normal leaflet separation  DOPPLER: The transtricuspid velocity was within the normal range  There was mild regurgitation  Pulmonary artery systolic pressure was within the normal  range  Estimated peak PA pressure was 31 mmHg  PULMONIC VALVE: Leaflets exhibited normal thickness, no calcification, and normal cuspal separation  DOPPLER: The transpulmonic velocity was within the normal range  There was no regurgitation  PERICARDIUM: There was no thickening  There was no pericardial effusion  AORTA: The root exhibited normal size  PULMONARY ARTERY: The size was normal  The morphology appeared normal     SYSTEM MEASUREMENT TABLES    2D mode  AoR Diam 2D: 2 8 cm  LA Diam (2D): 3 3 cm  LA/Ao (2D): 1 18  FS (2D Teich): 31 1 %  IVSd (2D): 1 03 cm  LVDEV: 59 3 cm³  LVESV: 23 9 cm³  LVIDd(2D): 3 73 cm  LVISd (2D): 2 57 cm  LVPWd (2D): 1 07 cm  SV (Teich): 35 4 cm³    Apical four chamber  LVEF A4C: 59 %    Unspecified Scan Mode  MV Peak A Taco: 933 mm/s  MV Peak E Taco   Mean: 787 mm/s  MVA (PHT): 3 28 cm squared  PHT: 67 ms  Max P mm[Hg]  V Max: 2360 mm/s  Vmax: 2380 mm/s  RA Area: 18 1 cm squared  RA Volume: 42 8 cm³  TAPSE: 2 1 cm    [facility] Accredited [facility]ch[facility]    Prepared and electronically signed by    Ludivina Doran DO  Signed 08-May-2018 16:37:33       EKG: Personally reviewed: NSR with no ST abnormalities    Imaging: I have personally reviewed pertinent films in PACS

## 2018-05-08 NOTE — PLAN OF CARE
Problem: PHYSICAL THERAPY ADULT  Goal: Performs mobility at highest level of function for planned discharge setting  See evaluation for individualized goals  Outcome: Adequate for Discharge        Assessment: Patient seen for Physical Therapy evaluation  Patient admitted with Hypertensive urgency  Comorbidities affecting patient's physical performance include: hypertension, hypothyroid, GERD, anxiety who presents with acute onset left side of neck pain today   Patient now presents as independent with transfers and gait despite cervical pain and limitations to ROM, therefore, skilled PT not required at this time and patient educated to follow with doctor and outpatient PT for cervical care as needed  Personal factors affecting patient at time of initial evaluation include: stairs to enter home, inability to ambulate household distances, inability to navigate community distances, inability to navigate level surfaces without external assistance, inability to perform dynamic tasks in community, limited home support, inability to perform current job functions, inability to perform physical activity, inability to perform ADLS and inability to perform IADLS   Prior to admission, patient was independent with functional mobility without assistive device, independent with ADLS, independent with IADLS, ambulating household distance, ambulating community distances and home with family assist   Please find objective findings from Physical Therapy assessment regarding body systems outlined above with impairments and limitations including no new functional mobility limitations, only limits to cervical area  The Barthel Index was used as a functional outcome tool presenting with a score of 100 today indicating no limitations of functional mobility and ADLS  Patient's clinical presentation is currently evolving as seen in patient's presentation of changing level of pain and new onset of impairment of functional mobility   As demonstrated by objective findings, the assigned level of complexity for this evaluation is moderate  Recommendation:  (home with follow up out patient PT for cervical care as need)          See flowsheet documentation for full assessment

## 2018-05-08 NOTE — PHYSICAL THERAPY NOTE
PT EVALUATION  Actual evaluation time [id number]     05/08/18 5325   Note Type   Note type Eval/Treat;Eval only   Pain Assessment   Pain Assessment 0-10   Pain Score 3  (L cervical area)   Home Living   Type of [address] [place] [address] One level;Stairs to enter with rails  (2 stairs to enter)   Home Equipment (none)   Additional Comments patient independent without assistive device prior to admission   Prior Function   Level of [place] Independent with ADLs and functional mobility   Lives With Spouse   Receives Help From Family   ADL Assistance Independent   IADLs Independent   Comments patient drives and gardens and is independent with household tasks as well as ADLs   Restrictions/Precautions   Other Precautions Pain   General   Additional Pertinent History chart reviewed, patient admitted with sudden onset L cervical pain after gardening activity  Patient here on observation due to pain and elevated BP  Patient now presents as independent with gait and transfers and able to demonstrate independence on stairs although with continued cervical pain  Patient does not require skilled PT at this time due to independence but will benefit from follow up with doctor and out patient PT for cervical care   Family/Caregiver Present No   Cognition   Overall Cognitive Status WFL   Arousal/Participation Cooperative   Orientation Level Oriented X4   Following Commands Follows all commands and directions without difficulty   RLE Assessment   RLE Assessment WFL   LLE Assessment   LLE Assessment WFL   Coordination   Movements are Fluid and Coordinated 1   Bed Mobility   Supine to Sit 7  Independent   Sit to Supine 7  Independent   Transfers   Sit to Stand 7  Independent   Stand to Sit 7  Independent   Additional Comments cervical ROM limited by 50% B rotations and 75% B sidebendings with L cervical pain   Paitent educated in use of towel roll for cervical positioning in sit and supine as well as education in gentle AROM cervical ranges including cervical retractions   Ambulation/Elevation   Gait Assistance 7  Independent   Assistive Device (none)   Distance 100 feet with change in direction   Stair Management Assistance 7  Independent   Stair Management Technique One rail R;Alternating pattern   Number of Stairs 4   Balance   Static Sitting Good   Dynamic Sitting Good   Static Standing Good   Dynamic Standing Good   Ambulatory Good   Activity Tolerance   Activity Tolerance Patient tolerated treatment well;Patient limited by pain  (cervical pain limited cervical motion)   Assessment   Assessment Patient seen for Physical Therapy evaluation  Patient admitted with Hypertensive urgency  Comorbidities affecting patient's physical performance include: hypertension, hypothyroid, GERD, anxiety who presents with acute onset left side of neck pain today   Patient now presents as independent with transfers and gait despite cervical pain and limitations to ROM, therefore, skilled PT not required at this time and patient educated to follow with doctor and outpatient PT for cervical care as needed  Personal factors affecting patient at time of initial evaluation include: stairs to enter home, inability to ambulate household distances, inability to navigate community distances, inability to navigate level surfaces without external assistance, inability to perform dynamic tasks in community, limited home support, inability to perform current job functions, inability to perform physical activity, inability to perform ADLS and inability to perform IADLS    Prior to admission, patient was independent with functional mobility without assistive device, independent with ADLS, independent with IADLS, ambulating household distance, ambulating community distances and home with family assist   Please find objective findings from Physical Therapy assessment regarding body systems outlined above with impairments and limitations including no new functional mobility limitations, only limits to cervical area  The Barthel Index was used as a functional outcome tool presenting with a score of 100 today indicating no limitations of functional mobility and ADLS  Patient's clinical presentation is currently evolving as seen in patient's presentation of changing level of pain and new onset of impairment of functional mobility  As demonstrated by objective findings, the assigned level of complexity for this evaluation is moderate     Goals   Patient Goals go home   STG Expiration Date (NA)   LTG Expiration Date (NA)   Recommendation   Recommendation (home with follow up out patient PT for cervical care as need)   Barthel Index   Feeding 10   Bathing 5   Grooming Score 5   Dressing Score 10   Bladder Score 10   Bowels Score 10   Toilet Use Score 10   Transfers (Bed/Chair) Score 15   Mobility (Level Surface) Score 15   Stairs Score 10   Barthel Index Score 441   Licensure   [place] License Number  Bo Antonio PT [id number]

## 2018-05-08 NOTE — ASSESSMENT & PLAN NOTE
Acute pain in left side of neck which is likely musculoskeletal in origin versus disturbances of skin sensation versus insect bite versus due to uncontrolled hypertension  Pain did improve while here and if remains persistent, she can follow up with her PMD for further evaluation/management in  Left carotid ultrasound showed no evidence of dissection in the left common carotid  There is <50% stenosis noted in the internal carotid artery

## 2018-05-08 NOTE — ED NOTES
Called into room by  reporting pt "feels funny"  Pt resting in bed upon entering room  Pt AAOx4  Pt reports feeling heart racing  Dr Taylor Cody at bedside  Pt reports feeling like heart is racing  Pt reports this "happens sometimes "and it happens at home, I take a couple of deep breaths and it goes away  Pt reports Dr Princess Batista aware  Pt monitor noted SR with occasional PAC's  /77  Pt denies pain to neck at this time, but admits occasional non-sustained pain like before, but not as severe         Stacy Syed RN  05/07/18 2023

## 2018-05-08 NOTE — ASSESSMENT & PLAN NOTE
Runs of PACs in ED per ED physician     Runs of SVT while on the floor   Patient seen by Cardiology  Continue Toprol-XL as she uses at home  Patient will follow up with Dr Alexis Gage after discharge  Echo showed ejection fraction of 55 % with mild concentric hypertrophy and grade 1 diastolic dysfunction

## 2018-05-08 NOTE — ASSESSMENT & PLAN NOTE
Blood pressures did improve while here  She was seen by Cardiology  Norvasc was added    Continue lisinopril, metoprolol  Follow up with her own cardiologist after discharge

## 2018-05-08 NOTE — SOCIAL WORK
Met with pt  Resides with her  Ivana Hernandez  Uses no dme  No hhc  Has been independent, but does not really drive much  Home is one floor with a few steps to enter  +LW  POA is pt's   Explained role of cm, no anticipated d/c needs  CM reviewed d/c planning process including the following: identifying help at home, patient preference for d/c planning needs, and availability of the treatment team to discuss questions or concerns patient and/or family may have regarding understanding of medications and recognizing signs and symptoms once discharged  CM also encouraged patient to follow up with all recommended appointments after discharge  Patient advised of importance for patient and family to participate in managing patient's medical well being

## 2018-05-08 NOTE — H&P
H&P- Ariel Horan 1934, 80 y o  female MRN: [id number]    Unit/Bed#: [address] [place] [address] Encounter: [id number]    Primary Care Provider: Lupis Hall MD   Date and time admitted to hospital: 5/7/2018  5:39 PM        * Hypertensive urgency   Assessment & Plan    · /105 on ED arrival   Patient received hydralazine 10 mg IV x1 in ED  · Latest BP was 180/77  · Patient compliant with BP medication at home  Will continue lisinopril, metoprolol  Will add Norvasc  · Monitor  Paroxysmal SVT (supraventricular tachycardia) ([facility])   Assessment & Plan    · Runs of PACs in ED per ED physician  EKG showed normal sinus rhythm, rate 81  · Magnesium 1 9  Potassium 3 8  · Will place patient on telemetry  Continue metoprolol from home  · Patient follows Dr Kelsie Maria in [facility]  Has history of cardiac arrhythmia per daughter  In fact, patient's appointment with Dr Kelsie Maria is in next few days per family  · Will consult Cardiology  Acute pain   Assessment & Plan    · Acute pain in left side of neck  Suspect musculoskeletal in origin versus disturbances of skin sensation versus insect bite versus 2/2 uncontrolled hypertension  · Patient reports pain is stabbing, intermittent, no apparent modifying factors, 4/10 in severity  Occurred about 1 hour after pulling weeds in yard  · Left carotid ultrasound showed no evidence of dissection in the left common carotid  There is <50% stenosis noted in the internal carotid artery  D dimer 570 which is nonspecific  · Will optimize BP control  Will order Tylenol p r n  hydrocortisone cream  Monitor for pain  Hypothyroid   Assessment & Plan    Continue Synthroid  No recent TSH, free T4  Will check in a m           Anxiety   Assessment & Plan    Continue Ativan p r n  VTE Prophylaxis: Enoxaparin (Lovenox)  / sequential compression device   Code Status: Full code  POLST: POLST form is not discussed and not completed at this time      Anticipated Length of Stay:  Patient will be admitted on an Observation basis with an anticipated length of stay of  < 2 midnights  Justification for Hospital Stay: PVCs, hypertensive urgency, acute pain  Total Time for Visit, including Counseling / Coordination of Care: 45 minutes  Greater than 50% of this total time spent on direct patient counseling and coordination of care  Chief Complaint:  Acute onset left side of neck pain  History of Present Illness:    Ariel Horan is a 80 y o  female with PMH of hypertension, hypothyroid, GERD, anxiety who presents with acute onset left side of neck pain today  She states the pain started about 1 hour after she pulled weeds in her yard for about 15 minutes today  The pain was localized, stabbing in nature, intermittent, no apparent modifying factors, 4/10 in severity  She denies headaches, dizziness, chest pain, SOB, nausea, diaphoresis associated with the pain  She denies fall or injury  She denies pruritus  She is not sure of insect bites in the yard  She took 2 Tylenol and Ativan prior to come to ED  She denies fever or chills  She denies pain in other parts of body  In ED, blood pressure was 237/105  Left carotid Doppler was negative for dissection  Chest x-ray unremarkable  Patient had runs of PACs in ED  EKG showed normal sinus rhythm in ED  Patient received hydralazine 10 mg IV x1, Percocet x1 in ED  Review of Systems:    Review of Systems   Musculoskeletal: Positive for neck pain  All other systems reviewed and are negative  Past Medical and Surgical History:     Past Medical History:   Diagnosis Date    Anxiety     GERD (gastroesophageal reflux disease)     Hypertension     Hypothyroid        Past Surgical History:   Procedure Laterality Date    BREAST BIOPSY Left 1974    COLONOSCOPY  04/2018    HYSTERECTOMY         Meds/Allergies:    Prior to Admission medications    Medication Sig Start Date End Date Taking?  Authorizing Provider levothyroxine 50 mcg tablet Take 75 mcg by mouth every other day     Yes Historical Provider, MD   levothyroxine 50 mcg tablet Take 50 mcg by mouth every other day   Yes Historical Provider, MD   lisinopril (ZESTRIL) 10 mg tablet Take 20 mg by mouth daily     Yes Historical Provider, MD   LORazepam (ATIVAN) 0 5 mg tablet Take 0 5 mg by mouth 2 (two) times a day as needed for anxiety     Yes Historical Provider, MD   metoprolol succinate (TOPROL-XL) 25 mg 24 hr tablet Take 25 mg by mouth 2 (two) times a day     Yes Historical Provider, MD   omeprazole (PriLOSEC) 20 mg delayed release capsule Take 20 mg by mouth daily  Yes Historical Provider, MD     I have reviewed home medications with patient personally  Allergies: Allergies   Allergen Reactions    Dye Fdc Red [Red Dye] Palpitations       Social History:     Marital Status: /Civil Union   Occupation:  Retired  Patient Pre-hospital Living Situation:  Lives with   Patient Pre-hospital Level of Mobility:  Independent  Patient Pre-hospital Diet Restrictions:  None  Substance Use History:   History   Alcohol Use No     History   Smoking Status    Former Smoker   Smokeless Tobacco    Never Used     History   Drug Use No       Family History:    History reviewed  No pertinent family history  Physical Exam:     Vitals:   Blood Pressure: 152/70 (05/08/18 0038)  Pulse: 86 (05/08/18 0038)  Temperature: 98 1 °F (36 7 °C) (05/07/18 2152)  Temp Source: Oral (05/07/18 2152)  Respirations: 18 (05/07/18 2152)  Weight - Scale: 60 kg (132 lb 4 4 oz) (05/07/18 2152)  SpO2: 97 % (05/07/18 2152)    Physical Exam   Constitutional: She is oriented to person, place, and time  She appears well-developed and well-nourished  HENT:   Head: Normocephalic and atraumatic  Neck: Normal range of motion  Neck supple  No tracheal deviation present  No thyromegaly present  Pain located around proximal sternocleidomastoid muscle  Non tender  Mild redness to the area No apparent puncture maricruz seen  Neck ROM does not affect the pain  Cardiovascular: Normal rate and regular rhythm  Exam reveals no gallop and no friction rub  No murmur heard  Pulmonary/Chest: Effort normal and breath sounds normal  No respiratory distress  She has no wheezes  She has no rales  Abdominal: Soft  Bowel sounds are normal  She exhibits no distension  There is no tenderness  There is no rebound  Musculoskeletal: Normal range of motion  She exhibits no edema, tenderness or deformity  Neurological: She is alert and oriented to person, place, and time  Skin: Skin is warm and dry  Psychiatric: She has a normal mood and affect  Nursing note and vitals reviewed  Additional Data:     Lab Results: I have personally reviewed pertinent reports  and I have personally reviewed pertinent films in PACS      Results from last 7 days  Lab Units 05/07/18  1826   WBC Thousand/uL 7 80   HEMOGLOBIN g/dL 12 9   HEMATOCRIT % 39 8   PLATELETS Thousands/uL 317   NEUTROS PCT % 61   LYMPHS PCT % 24   MONOS PCT % 7   EOS PCT % 6       Results from last 7 days  Lab Units 05/07/18  1826   SODIUM mmol/L 140   POTASSIUM mmol/L 3 8   CHLORIDE mmol/L 104   CO2 mmol/L 30   BUN mg/dL 14   CREATININE mg/dL 0 91   CALCIUM mg/dL 8 9   TOTAL PROTEIN g/dL 7 4   BILIRUBIN TOTAL mg/dL 0 20   ALK PHOS U/L 72   ALT U/L 20   AST U/L 22   GLUCOSE RANDOM mg/dL 90       Results from last 7 days  Lab Units 05/07/18  1826   INR  1 01       Imaging: I have personally reviewed pertinent reports  Ct Abdomen Pelvis Wo Contrast    Result Date: 4/23/2018  Narrative: CT ABDOMEN AND PELVIS WITHOUT IV CONTRAST INDICATION:   R10 32: Left lower quadrant pain  COMPARISON: CT abdomen and pelvis 9/25/2015  TECHNIQUE:  CT examination of the abdomen and pelvis was performed without intravenous contrast   Axial, sagittal, and coronal 2D reformatted images were created from the source data and submitted for interpretation  Radiation dose length product (DLP) for this visit:  376 65 mGy-cm   This examination, like all CT scans performed in the [facility], was performed utilizing techniques to minimize radiation dose exposure, including the use of iterative  reconstruction and automated exposure control  Enteric contrast was administered  FINDINGS: ABDOMEN LOWER CHEST:  No clinically significant abnormality identified in the visualized lower chest  LIVER/BILIARY TREE:  Unremarkable  GALLBLADDER:  No calcified gallstones  No pericholecystic inflammatory change  SPLEEN:  Unremarkable  PANCREAS:  Unremarkable  ADRENAL GLANDS:  Unremarkable  KIDNEYS/URETERS:  One or more simple appearing renal cyst(s) is noted  Otherwise unremarkable kidneys  No hydronephrosis  Several tiny calculi in the right kidney are nonobstructing  STOMACH AND BOWEL:  There is colonic diverticulosis without evidence of acute diverticulitis  APPENDIX:  A normal appendix was visualized  ABDOMINOPELVIC CAVITY:  No ascites or free intraperitoneal air  No lymphadenopathy  VESSELS:  Unremarkable for patient's age  PELVIS REPRODUCTIVE ORGANS:  Patient is status post hysterectomy  URINARY BLADDER:  Unremarkable  ABDOMINAL WALL/INGUINAL REGIONS:  Unremarkable  OSSEOUS STRUCTURES:  No acute fracture or destructive osseous lesion  Impression: 1  Sigmoid diverticulosis without acute diverticulitis, appendicitis, or bowel obstruction  2   Subcentimeter nonobstructing right nephrolithiasis  Workstation performed: [id number]     Vas Carotid Limited Study    Result Date: 5/7/2018  Narrative:  [facility] REPORT CLINICAL: Indications:  Unspecified disturbances of skin sensation [R20 9]  Patient presents for a general health evaluation secondary to other cardiovascular symptoms  Patient is asymptomatic from a cerebral vascular standpoint  Risk Factors The patient has history of HTN and previous smoking (quit >10yrs ago)   Clinical Right Pressure: 237/155 mm Hg, Left Pressure:  / mm Hg  FINDINGS:  Left         Impression  PSV  EDV (cm/s)  Direction of Flow  Ratio  Dist  ICA                166          51                      2 41  Mid  ICA                 111          46                      1 61  Prox  ICA    1 - 49%      47           9                      0 68  Dist CCA                  65                                        Mid CCA                   69                                  0 91  Prox CCA                  76                                        Ext Carotid               55                                  0 88  Prox Vert                 60              Antegrade                 Subclavian               143                                           CONCLUSION: RIGHT: DOCTOR ORDER LEFT ONLY  LEFT: No evidence of dissection in the left common carotid  There is <50% stenosis noted in the internal carotid artery  Plaque is heterogenous and irregular  Vertebral artery flow is antegrade  There is no significant subclavian artery disease  No prior studies are available for comparison  Technical findings given to Dr Osvaldo Coleman 18:35  Internal carotid artery stenosis determination by consensus criteria from: Reji Lynch , et al  Carotid Artery Stenosis: Gray-Scale and Doppler US Diagnosis - Society of Radiologists in [address] [place] [address], Radiology 2003; [phone]  EKG, Pathology, and Other Studies Reviewed on Admission:   · EKG: NSR  Allscripts Records Reviewed: Yes     ** Please Note: Dragon 360 Dictation voice to text software may have been used in the creation of this document   **

## 2018-05-08 NOTE — ASSESSMENT & PLAN NOTE
· Acute pain in left side of neck  Suspect musculoskeletal in origin versus disturbances of skin sensation versus insect bite versus 2/2 uncontrolled hypertension  · Patient reports pain is stabbing, intermittent, no apparent modifying factors, 4/10 in severity  Occurred about 1 hour after pulling weeds in yard  · Left carotid ultrasound showed no evidence of dissection in the left common carotid  There is <50% stenosis noted in the internal carotid artery  D dimer 570 which is nonspecific  · Will optimize BP control  Will order Tylenol p r n  hydrocortisone cream  Monitor for pain  No

## 2018-05-08 NOTE — DISCHARGE SUMMARY
Discharge Summary - [facility] Internal Medicine    Patient Information: Keith Sanchez 80 y o  female MRN: [id number]  Unit/Bed#: [address] [place] [address] Encounter: [id number]    Discharging Physician / Practitioner: Stacey Moreno DO  PCP: Jean Blackwell MD  Admission Date: 5/7/2018  Discharge Date: 05/08/18    Reason for Admission: Neck Pain (c/o having an intermittant stabbing pain in left side of neck)      Discharge Diagnoses:     Principal Problem (Resolved): Hypertensive urgency  Active Problems:    Paroxysmal SVT (supraventricular tachycardia) ([facility])    Acute pain    Hypothyroid    Anxiety        * Hypertensive urgency-resolved as of 5/8/2018   Assessment & Plan    Blood pressures did improve while here  She was seen by Cardiology  Norvasc was added  Continue lisinopril, metoprolol  Follow up with her own cardiologist after discharge          Paroxysmal SVT (supraventricular tachycardia) ([facility] [address] )   Assessment & Plan    Runs of PACs in ED per ED physician  Runs of SVT while on the floor   Patient seen by Cardiology  Continue Toprol-XL as she uses at home  Patient will follow up with Dr Hermelindo Arce after discharge  Echo showed ejection fraction of 55 % with mild concentric hypertrophy and grade 1 diastolic dysfunction        Hypothyroid   Assessment & Plan    Continue Synthroid  TSH WNL        Acute pain   Assessment & Plan    Acute pain in left side of neck which is likely musculoskeletal in origin versus disturbances of skin sensation versus insect bite versus due to uncontrolled hypertension  Pain did improve while here and if remains persistent, she can follow up with her PMD for further evaluation/management in  Left carotid ultrasound showed no evidence of dissection in the left common carotid  There is <50% stenosis noted in the internal carotid artery           Anxiety   Assessment & Plan    Continue Ativan p r n            Consultations During Hospital Stay:  None    Procedures Performed: · Echo    Significant Findings:     · See hospital course and above    Imaging while in hospital:    Ct Abdomen Pelvis Wo Contrast    Result Date: 4/23/2018  Narrative: CT ABDOMEN AND PELVIS WITHOUT IV CONTRAST INDICATION:   R10 32: Left lower quadrant pain  COMPARISON: CT abdomen and pelvis 9/25/2015  TECHNIQUE:  CT examination of the abdomen and pelvis was performed without intravenous contrast   Axial, sagittal, and coronal 2D reformatted images were created from the source data and submitted for interpretation  Radiation dose length product (DLP) for this visit:  376 65 mGy-cm   This examination, like all CT scans performed in the [facility], was performed utilizing techniques to minimize radiation dose exposure, including the use of iterative  reconstruction and automated exposure control  Enteric contrast was administered  FINDINGS: ABDOMEN LOWER CHEST:  No clinically significant abnormality identified in the visualized lower chest  LIVER/BILIARY TREE:  Unremarkable  GALLBLADDER:  No calcified gallstones  No pericholecystic inflammatory change  SPLEEN:  Unremarkable  PANCREAS:  Unremarkable  ADRENAL GLANDS:  Unremarkable  KIDNEYS/URETERS:  One or more simple appearing renal cyst(s) is noted  Otherwise unremarkable kidneys  No hydronephrosis  Several tiny calculi in the right kidney are nonobstructing  STOMACH AND BOWEL:  There is colonic diverticulosis without evidence of acute diverticulitis  APPENDIX:  A normal appendix was visualized  ABDOMINOPELVIC CAVITY:  No ascites or free intraperitoneal air  No lymphadenopathy  VESSELS:  Unremarkable for patient's age  PELVIS REPRODUCTIVE ORGANS:  Patient is status post hysterectomy  URINARY BLADDER:  Unremarkable  ABDOMINAL WALL/INGUINAL REGIONS:  Unremarkable  OSSEOUS STRUCTURES:  No acute fracture or destructive osseous lesion  Impression: 1  Sigmoid diverticulosis without acute diverticulitis, appendicitis, or bowel obstruction   2  Subcentimeter nonobstructing right nephrolithiasis  Workstation performed: [id number]     Xr Chest Portable    Result Date: 5/8/2018  Narrative: CHEST INDICATION:   Left-sided neck pain  COMPARISON:  5/27/2016 EXAM PERFORMED/VIEWS:  XR CHEST PORTABLE FINDINGS: Cardiomediastinal silhouette appears unremarkable  The lungs are clear  No pneumothorax or pleural effusion  Osseous structures appear within normal limits for patient age  Impression: No acute cardiopulmonary disease  Workstation performed: [id number]     Ct Head Wo Contrast    Result Date: 5/8/2018  Narrative: CT BRAIN - WITHOUT CONTRAST INDICATION:   tightness in occipital region    COMPARISON:  CT head 5/27/2015 TECHNIQUE:  CT examination of the brain was performed  In addition to axial images, coronal 2D reformatted images were created and submitted for interpretation  Radiation dose length product (DLP) for this visit:  1166 65 mGy-cm   This examination, like all CT scans performed in the [facility], was performed utilizing techniques to minimize radiation dose exposure, including the use of iterative reconstruction and automated exposure control  IMAGE QUALITY:  Diagnostic  FINDINGS: PARENCHYMA:  No intracranial mass, mass effect or midline shift  No CT signs of acute infarction  No acute intracranial hemorrhage  Calcification bilateral cavernous internal carotid arteries  Periventricular white matter hypodensity is a nonspecific finding likely representing chronic microangiopathy  VENTRICLES AND EXTRA-AXIAL SPACES:  No acute hydrocephalus  Mild prominence of CSF spaces diffusely attributed to generalized volume loss left greater than right  VISUALIZED ORBITS AND PARANASAL SINUSES:  Changes of bilateral lens replacements noted  Complete opacification of the left frontal sinus and frontoethmoidal recess  Moderate mucosal thickening bilateral ethmoid sinuses left greater than right    Minimal  mucosal thickening in the remainder of the paranasal sinuses  No gas fluid levels  CALVARIUM AND EXTRACRANIAL SOFT TISSUES:  Normal      Impression: No CT evidence of acute intracranial process or significant interval change  Chronic microangiopathy  Severe chronic paranasal sinusitis with now complete opacification of the left frontal sinus  Findings are consistent with the preliminary report from [facility] which was provided shortly after completion of the exam  Workstation performed: [id number]     Vas Carotid Limited Study    Result Date: 5/8/2018  Narrative:  [facility] REPORT CLINICAL: Indications:  Unspecified disturbances of skin sensation [R20 9]  Patient presents for a general health evaluation secondary to other cardiovascular symptoms  Patient is asymptomatic from a cerebral vascular standpoint  Risk Factors The patient has history of HTN and previous smoking (quit >10yrs ago)  Clinical Right Pressure:  237/155 mm Hg, Left Pressure:  / mm Hg  FINDINGS:  Left         Impression  PSV  EDV (cm/s)  Direction of Flow  Ratio  Dist  ICA                166          51                      2 41  Mid  ICA                 111          46                      1 61  Prox  ICA    1 - 49%      47           9                      0 68  Dist CCA                  65                                        Mid CCA                   69                                  0 91  Prox CCA                  76                                        Ext Carotid               55                                  0 88  Prox Vert                 60              Antegrade                 Subclavian               143                                           CONCLUSION:  Impression RIGHT: DOCTOR ORDER LEFT ONLY  LEFT: No evidence of dissection in the left common carotid  There is <50% stenosis noted in the internal carotid artery  Plaque is heterogenous and irregular  Vertebral artery flow is antegrade   There is no significant subclavian artery disease  No prior studies are available for comparison  Technical findings given to Dr Kelli Jade 18:35  Internal carotid artery stenosis determination by consensus criteria from: Ammy Aiken et al  Carotid Artery Stenosis: Gray-Scale and Doppler US Diagnosis - Society of Radiologists in [address] [place] [address], Radiology 2003; [phone]  SIGNATURE: Electronically Signed by: Swati Pardo on 2018-05-08 05:17:52 AM      Incidental Findings:   · Severe chronic paranasal sinusitis    Test Results Pending at Discharge (will require follow up):   · As per After Visit Summary     Outpatient Tests Requested:  · none    Complications:  See hospital course and above    Hospital Course:     Shady Ace is a 80 y o  female patient who originally presented to the hospital on 5/7/2018 due to acute onset of left sided neck pain  It started about 1 hour after she pulled weeds in her yard  In the ER her blood pressure was noted to be elevated  While on telemetry she was noted to have runs of SVTs while patient was in the bathroom associated with occipital tightness and nausea  She was seen by Cardiology and cleared for discharge home  CT head showed findings suggestive of severe chronic paranasal sinusitis however patient denies any symptoms and will follow up with her PCP regarding this  she also has a ENT doctor that she follows up with  Please see above list of diagnoses and related plan for additional information  Condition at Discharge: stable     Discharge Day Visit / Exam:     Subjective:  Reports mild neck pain    denies any nasal congestion, rhinorrhea, sinus pain/pressure    Vitals: Blood Pressure: 135/64 (05/08/18 1538)  Pulse: 85 (05/08/18 1538)  Temperature: 98 1 °F (36 7 °C) (05/08/18 1538)  Temp Source: Temporal (05/08/18 1242)  Respirations: 18 (05/08/18 1538)  Height: 5' 1" (154 9 cm) (05/07/18 2152)  Weight - Scale: 60 kg (132 lb 4 4 oz) (05/07/18 2152)  SpO2: 94 % (05/08/18 9595)  Exam:   Physical Exam   Constitutional: She is oriented to person, place, and time  She appears well-developed and well-nourished  No distress  HENT:   Head: Normocephalic and atraumatic  Mouth/Throat: Oropharynx is clear and moist    No sinus tenderness noted   Eyes: EOM are normal  Right eye exhibits no discharge  Left eye exhibits no discharge  No scleral icterus  Neck: Neck supple  No tracheal deviation present  Pain located around sternocleidomastoid muscle  Good ROM   Cardiovascular: Normal rate and regular rhythm  Pulmonary/Chest: Effort normal and breath sounds normal  No respiratory distress  She has no wheezes  She has no rales  Abdominal: Soft  Bowel sounds are normal  She exhibits no distension  There is no tenderness  Musculoskeletal: She exhibits no edema  Neurological: She is alert and oriented to person, place, and time  No cranial nerve deficit  Skin: Skin is dry  She is not diaphoretic  Psychiatric: She has a normal mood and affect  Discharge instructions/Information to patient and family:(Discharge Medications and Follow up):   See after visit summary for information provided to patient and family  Provisions for Follow-Up Care:  See after visit summary for information related to follow-up care and any pertinent home health orders  Disposition: Home    Planned Readmission:  No     Discharge Statement:  I spent 30 minutes discharging the patient  This time was spent on the day of discharge  I had direct contact with the patient on the day of discharge  Greater than 50% of the total time was spent examining patient, answering all patient questions, arranging and discussing plan of care with patient as well as directly providing post-discharge instructions  Additional time then spent on discharge activities  Discharge Medications:  See after visit summary for reconciled discharge medications provided to patient and family        ** Please Note: Dictation voice to text software may have been used in the creation of this document   **

## 2018-05-08 NOTE — PLAN OF CARE
CARDIOVASCULAR - ADULT     Maintains optimal cardiac output and hemodynamic stability Progressing     Absence of cardiac dysrhythmias or at baseline rhythm Progressing        DISCHARGE PLANNING     Discharge to home or other facility with appropriate resources Progressing        Knowledge Deficit     Patient/family/caregiver demonstrates understanding of disease process, treatment plan, medications, and discharge instructions Progressing        METABOLIC, FLUID AND ELECTROLYTES - ADULT     Electrolytes maintained within normal limits Progressing        PAIN - ADULT     Verbalizes/displays adequate comfort level or baseline comfort level Progressing

## 2018-05-09 ENCOUNTER — APPOINTMENT (OUTPATIENT)
Dept: LAB | Facility: CLINIC | Age: 83
End: 2018-05-09
Payer: MEDICARE

## 2018-05-09 ENCOUNTER — TRANSCRIBE ORDERS (OUTPATIENT)
Dept: LAB | Facility: CLINIC | Age: 83
End: 2018-05-09

## 2018-05-09 DIAGNOSIS — I10 ESSENTIAL HYPERTENSION, MALIGNANT: Primary | ICD-10-CM

## 2018-05-09 DIAGNOSIS — Z79.899 ENCOUNTER FOR LONG-TERM (CURRENT) USE OF MEDICATIONS: ICD-10-CM

## 2018-05-09 LAB
ANION GAP SERPL CALCULATED.3IONS-SCNC: 6 MMOL/L (ref 4–13)
BUN SERPL-MCNC: 19 MG/DL (ref 5–25)
CALCIUM SERPL-MCNC: 9.7 MG/DL (ref 8.3–10.1)
CHLORIDE SERPL-SCNC: 103 MMOL/L (ref 100–108)
CO2 SERPL-SCNC: 28 MMOL/L (ref 21–32)
CREAT SERPL-MCNC: 0.96 MG/DL (ref 0.6–1.3)
GFR SERPL CREATININE-BSD FRML MDRD: 54 ML/MIN/1.73SQ M
GLUCOSE SERPL-MCNC: 84 MG/DL (ref 65–140)
MAGNESIUM SERPL-MCNC: 2.4 MG/DL (ref 1.6–2.6)
MRSA NOSE QL CULT: NORMAL
POTASSIUM SERPL-SCNC: 4.7 MMOL/L (ref 3.5–5.3)
SODIUM SERPL-SCNC: 137 MMOL/L (ref 136–145)
TSH SERPL DL<=0.05 MIU/L-ACNC: 1.19 UIU/ML (ref 0.36–3.74)

## 2018-05-09 PROCEDURE — 80048 BASIC METABOLIC PNL TOTAL CA: CPT

## 2018-05-09 PROCEDURE — 83735 ASSAY OF MAGNESIUM: CPT

## 2018-05-09 PROCEDURE — 84443 ASSAY THYROID STIM HORMONE: CPT

## 2018-05-09 PROCEDURE — 36415 COLL VENOUS BLD VENIPUNCTURE: CPT

## 2018-05-10 LAB
ATRIAL RATE: 81 BPM
P AXIS: 57 DEGREES
PR INTERVAL: 152 MS
QRS AXIS: 2 DEGREES
QRSD INTERVAL: 80 MS
QT INTERVAL: 396 MS
QTC INTERVAL: 460 MS
T WAVE AXIS: 21 DEGREES
VENTRICULAR RATE: 81 BPM

## 2018-05-10 PROCEDURE — 93010 ELECTROCARDIOGRAM REPORT: CPT | Performed by: INTERNAL MEDICINE

## 2018-05-15 ENCOUNTER — TRANSCRIBE ORDERS (OUTPATIENT)
Dept: LAB | Facility: CLINIC | Age: 83
End: 2018-05-15

## 2018-05-15 ENCOUNTER — APPOINTMENT (OUTPATIENT)
Dept: LAB | Facility: CLINIC | Age: 83
End: 2018-05-15
Payer: MEDICARE

## 2018-05-15 DIAGNOSIS — M25.50 PAIN IN JOINT, MULTIPLE SITES: Primary | ICD-10-CM

## 2018-05-15 DIAGNOSIS — E55.9 VITAMIN D DEFICIENCY: ICD-10-CM

## 2018-05-15 LAB — 25(OH)D3 SERPL-MCNC: 25.4 NG/ML (ref 30–100)

## 2018-05-15 PROCEDURE — 36415 COLL VENOUS BLD VENIPUNCTURE: CPT

## 2018-05-15 PROCEDURE — 86618 LYME DISEASE ANTIBODY: CPT

## 2018-05-15 PROCEDURE — 82306 VITAMIN D 25 HYDROXY: CPT

## 2018-05-16 LAB
B BURGDOR IGG SER IA-ACNC: 0.27
B BURGDOR IGM SER IA-ACNC: 0.69

## 2018-06-04 ENCOUNTER — OFFICE VISIT (OUTPATIENT)
Dept: AUDIOLOGY | Facility: CLINIC | Age: 83
End: 2018-06-04
Payer: COMMERCIAL

## 2018-06-04 DIAGNOSIS — H90.3 SENSORINEURAL HEARING LOSS, BILATERAL: Primary | ICD-10-CM

## 2018-06-04 PROCEDURE — V5261 HEARING AID, DIGIT, BIN, BTE: HCPCS | Performed by: AUDIOLOGIST

## 2018-06-04 NOTE — PROGRESS NOTES
Hearing aid Fitting    Name:  Dawit Randhawa  :  1934  Age:  80 y o  Date of Evaluation: 18       Dawit Randhawa is being see today to be fit with new hearing aids  Patient is being fit with Oticon Opn 3 miniRITE hearing aids  Right serial number [id number] / left serial number [id number]  Warranty date 2021  The hearing aid(s) were adjusted based on the patient's most recent audiogram and patient comfort - Step 2  Patient reported good sound quality  Care and cleaning of the hearing aids was reviewed  Domes (size 6mm DV), filters, and batteries were reviewed with the patient  The patient's battery size is 312  Pt received Z power kit,  s/n [id number]  Warranty on  expires 2019  Insertion and removal of the hearing aids was done  The patient practiced insertion and removal of the devices in the office, they demonstrated excellent ability to manipulate the hearing aids  The patient expressed satisfaction with the hearing aids  Recommendation:   Follow up scheduled on 2018          Susy Jones, Audiology Intern  Anais Corona , [address]  Clinical Audiologist

## 2018-07-09 ENCOUNTER — OFFICE VISIT (OUTPATIENT)
Dept: AUDIOLOGY | Facility: CLINIC | Age: 83
End: 2018-07-09

## 2018-07-09 DIAGNOSIS — H90.3 SENSORINEURAL HEARING LOSS, BILATERAL: Primary | ICD-10-CM

## 2018-07-25 ENCOUNTER — OFFICE VISIT (OUTPATIENT)
Dept: AUDIOLOGY | Facility: CLINIC | Age: 83
End: 2018-07-25

## 2018-07-25 DIAGNOSIS — H90.3 SENSORINEURAL HEARING LOSS, BILATERAL: Primary | ICD-10-CM

## 2018-07-31 NOTE — PROGRESS NOTES
Progress Note    Name:  Zakiya Awad  :  1934  Age:  80 y o  Date of Evaluation: 18    Mrs Yolande Cope was fit with custom canal lock micromolds due to slipping and better sound quality when pushed into canals  N/C with first pair  The # 1 receives were maintained  Settings of hearing aids put to step 2-3 in 1 month wear time  She reports not wearing all waking hours; Advised as to use  Recommendations:   1  RTO 18 for check of molds  If good outcomes, RTO in 5-6 months for general maintenance of devices        Anais Jade , CCC-A, [place]# [id number], Hearing Aid Dispenser, [place] [id number]  Clinical Audiologist

## 2018-08-13 ENCOUNTER — OFFICE VISIT (OUTPATIENT)
Dept: AUDIOLOGY | Facility: CLINIC | Age: 83
End: 2018-08-13

## 2018-08-13 DIAGNOSIS — H90.3 SENSORINEURAL HEARING LOSS, BILATERAL: Primary | ICD-10-CM

## 2018-08-13 NOTE — PROGRESS NOTES
Hearing Aid Visit:    Name:  Keith Sanchez  :  1934  Age:  80 y o  Date of Evaluation: 18     Keith Sanchez is being seen for a hearing aid visit  Keith Sanchez   is fit binaurally with Oticon Opn 3 miniRITE  hearing aid(s) serial number [id number] right/ serial number [id number] left  Warranty expires 2021  Patient reports she is very pleased with molds  She states that she notices great improvement in conversation and at [facility]  She reports that occasionally she notices that television voices sound "lispy", but does not notice this quality in regular conversation  Action:  Wear time is approximately 5 hours per day  Pt reports she does not wear the hearing aids every day  Counseled patient on importance of consistent wear of hearing aids, and encouraged her to increase wear time  Pt states she is very pleased overall with hearing aids, and was hesitant to make any programming changes to help with television  Patient agreed to try to increase wear time and see if she notices that she acclimates to sound of TV  If after 2 weeks she does not notice improvement, she will return for a programming appointment  Counseled patient on importance of advocating for herself -- asking her  and other conversational partners to look at her when they are speaking, and to get her attention before starting a conversation  Recommendations:   Pt should return in 2 weeks for programming if TV listening has not improved  Otherwise, she will make appointment for 5-6 months for regular maintenance         Jose Ahmadi, Audiology Intern  Anais Ga , [address]  Clinical Audiologist

## 2018-09-17 ENCOUNTER — APPOINTMENT (OUTPATIENT)
Dept: LAB | Facility: CLINIC | Age: 83
End: 2018-09-17
Payer: MEDICARE

## 2018-09-17 ENCOUNTER — TRANSCRIBE ORDERS (OUTPATIENT)
Dept: LAB | Facility: CLINIC | Age: 83
End: 2018-09-17

## 2018-09-17 DIAGNOSIS — I51.9 MYXEDEMA HEART DISEASE: ICD-10-CM

## 2018-09-17 DIAGNOSIS — E03.9 MYXEDEMA HEART DISEASE: ICD-10-CM

## 2018-09-17 DIAGNOSIS — R53.81 DEBILITY: Primary | ICD-10-CM

## 2018-09-17 DIAGNOSIS — R53.82 CHRONIC FATIGUE SYNDROME: ICD-10-CM

## 2018-09-17 DIAGNOSIS — I10 ESSENTIAL HYPERTENSION, MALIGNANT: ICD-10-CM

## 2018-09-17 DIAGNOSIS — K21.9 GASTROESOPHAGEAL REFLUX DISEASE, ESOPHAGITIS PRESENCE NOT SPECIFIED: ICD-10-CM

## 2018-09-17 LAB
25(OH)D3 SERPL-MCNC: 19.5 NG/ML (ref 30–100)
ALBUMIN SERPL BCP-MCNC: 3.5 G/DL (ref 3.5–5)
ALP SERPL-CCNC: 118 U/L (ref 46–116)
ALT SERPL W P-5'-P-CCNC: 21 U/L (ref 12–78)
AMORPH URATE CRY URNS QL MICRO: ABNORMAL /HPF
ANION GAP SERPL CALCULATED.3IONS-SCNC: 11 MMOL/L (ref 4–13)
AST SERPL W P-5'-P-CCNC: 16 U/L (ref 5–45)
BACTERIA UR QL AUTO: ABNORMAL /HPF
BASOPHILS # BLD AUTO: 0.06 THOUSANDS/ΜL (ref 0–0.1)
BASOPHILS NFR BLD AUTO: 1 % (ref 0–1)
BILIRUB SERPL-MCNC: 0.56 MG/DL (ref 0.2–1)
BILIRUB UR QL STRIP: ABNORMAL
BUN SERPL-MCNC: 13 MG/DL (ref 5–25)
CALCIUM SERPL-MCNC: 9.7 MG/DL (ref 8.3–10.1)
CHLORIDE SERPL-SCNC: 102 MMOL/L (ref 100–108)
CHOLEST SERPL-MCNC: 241 MG/DL (ref 50–200)
CLARITY UR: CLEAR
CO2 SERPL-SCNC: 26 MMOL/L (ref 21–32)
COLOR UR: ABNORMAL
CREAT SERPL-MCNC: 0.84 MG/DL (ref 0.6–1.3)
EOSINOPHIL # BLD AUTO: 0.36 THOUSAND/ΜL (ref 0–0.61)
EOSINOPHIL NFR BLD AUTO: 4 % (ref 0–6)
ERYTHROCYTE [DISTWIDTH] IN BLOOD BY AUTOMATED COUNT: 13.5 % (ref 11.6–15.1)
ERYTHROCYTE [SEDIMENTATION RATE] IN BLOOD: 46 MM/HOUR (ref 0–20)
GFR SERPL CREATININE-BSD FRML MDRD: 64 ML/MIN/1.73SQ M
GLUCOSE P FAST SERPL-MCNC: 95 MG/DL (ref 65–99)
GLUCOSE UR STRIP-MCNC: NEGATIVE MG/DL
HCT VFR BLD AUTO: 42.7 % (ref 34.8–46.1)
HDLC SERPL-MCNC: 64 MG/DL (ref 40–60)
HGB BLD-MCNC: 13.4 G/DL (ref 11.5–15.4)
HGB UR QL STRIP.AUTO: ABNORMAL
IMM GRANULOCYTES # BLD AUTO: 0.04 THOUSAND/UL (ref 0–0.2)
IMM GRANULOCYTES NFR BLD AUTO: 0 % (ref 0–2)
IRON SERPL-MCNC: 47 UG/DL (ref 50–170)
KETONES UR STRIP-MCNC: ABNORMAL MG/DL
LDLC SERPL CALC-MCNC: 147 MG/DL (ref 0–100)
LEUKOCYTE ESTERASE UR QL STRIP: ABNORMAL
LYMPHOCYTES # BLD AUTO: 1.59 THOUSANDS/ΜL (ref 0.6–4.47)
LYMPHOCYTES NFR BLD AUTO: 16 % (ref 14–44)
MAGNESIUM SERPL-MCNC: 2.2 MG/DL (ref 1.6–2.6)
MCH RBC QN AUTO: 30.9 PG (ref 26.8–34.3)
MCHC RBC AUTO-ENTMCNC: 31.4 G/DL (ref 31.4–37.4)
MCV RBC AUTO: 98 FL (ref 82–98)
MONOCYTES # BLD AUTO: 0.69 THOUSAND/ΜL (ref 0.17–1.22)
MONOCYTES NFR BLD AUTO: 7 % (ref 4–12)
NEUTROPHILS # BLD AUTO: 7.45 THOUSANDS/ΜL (ref 1.85–7.62)
NEUTS SEG NFR BLD AUTO: 72 % (ref 43–75)
NITRITE UR QL STRIP: NEGATIVE
NON-SQ EPI CELLS URNS QL MICRO: ABNORMAL /HPF
NONHDLC SERPL-MCNC: 177 MG/DL
NRBC BLD AUTO-RTO: 0 /100 WBCS
PH UR STRIP.AUTO: 6 [PH] (ref 4.5–8)
PLATELET # BLD AUTO: 431 THOUSANDS/UL (ref 149–390)
PMV BLD AUTO: 11.5 FL (ref 8.9–12.7)
POTASSIUM SERPL-SCNC: 3.6 MMOL/L (ref 3.5–5.3)
PROT SERPL-MCNC: 8.4 G/DL (ref 6.4–8.2)
PROT UR STRIP-MCNC: ABNORMAL MG/DL
RBC # BLD AUTO: 4.34 MILLION/UL (ref 3.81–5.12)
RBC #/AREA URNS AUTO: ABNORMAL /HPF
SODIUM SERPL-SCNC: 139 MMOL/L (ref 136–145)
SP GR UR STRIP.AUTO: 1.02 (ref 1–1.03)
TRIGL SERPL-MCNC: 148 MG/DL
TSH SERPL DL<=0.05 MIU/L-ACNC: 0.79 UIU/ML (ref 0.36–3.74)
UROBILINOGEN UR QL STRIP.AUTO: 0.2 E.U./DL
VIT B12 SERPL-MCNC: 641 PG/ML (ref 100–900)
WBC # BLD AUTO: 10.19 THOUSAND/UL (ref 4.31–10.16)
WBC #/AREA URNS AUTO: ABNORMAL /HPF

## 2018-09-17 PROCEDURE — 80061 LIPID PANEL: CPT

## 2018-09-17 PROCEDURE — 82607 VITAMIN B-12: CPT

## 2018-09-17 PROCEDURE — 81001 URINALYSIS AUTO W/SCOPE: CPT

## 2018-09-17 PROCEDURE — 85025 COMPLETE CBC W/AUTO DIFF WBC: CPT

## 2018-09-17 PROCEDURE — 36415 COLL VENOUS BLD VENIPUNCTURE: CPT

## 2018-09-17 PROCEDURE — 84443 ASSAY THYROID STIM HORMONE: CPT

## 2018-09-17 PROCEDURE — 83735 ASSAY OF MAGNESIUM: CPT

## 2018-09-17 PROCEDURE — 85652 RBC SED RATE AUTOMATED: CPT

## 2018-09-17 PROCEDURE — 80053 COMPREHEN METABOLIC PANEL: CPT

## 2018-09-17 PROCEDURE — 83540 ASSAY OF IRON: CPT

## 2018-09-17 PROCEDURE — 82306 VITAMIN D 25 HYDROXY: CPT

## 2019-01-22 ENCOUNTER — HOSPITAL ENCOUNTER (OUTPATIENT)
Dept: RADIOLOGY | Facility: HOSPITAL | Age: 84
Discharge: HOME | End: 2019-01-22
Attending: INTERNAL MEDICINE
Payer: MEDICARE

## 2019-01-22 ENCOUNTER — APPOINTMENT (OUTPATIENT)
Dept: RADIOLOGY | Facility: HOSPITAL | Age: 84
End: 2019-01-22
Attending: INTERNAL MEDICINE
Payer: MEDICARE

## 2019-01-22 ENCOUNTER — TRANSCRIBE ORDERS (OUTPATIENT)
Dept: ADMINISTRATIVE | Facility: HOSPITAL | Age: 84
End: 2019-01-22

## 2019-01-22 DIAGNOSIS — R10.32 ABDOMINAL PAIN, LEFT LOWER QUADRANT: Primary | ICD-10-CM

## 2019-01-22 DIAGNOSIS — R10.32 ABDOMINAL PAIN, LEFT LOWER QUADRANT: ICD-10-CM

## 2019-01-22 PROCEDURE — 74176 CT ABD & PELVIS W/O CONTRAST: CPT

## 2019-04-18 ENCOUNTER — TRANSCRIBE ORDERS (OUTPATIENT)
Dept: ADMINISTRATIVE | Facility: HOSPITAL | Age: 84
End: 2019-04-18

## 2019-04-18 DIAGNOSIS — R06.89 TROUBLE BREATHING: Primary | ICD-10-CM

## 2019-04-25 ENCOUNTER — HOSPITAL ENCOUNTER (OUTPATIENT)
Dept: PULMONOLOGY | Facility: HOSPITAL | Age: 84
Discharge: HOME | End: 2019-04-25
Attending: INTERNAL MEDICINE
Payer: MEDICARE

## 2019-04-25 DIAGNOSIS — R06.89 TROUBLE BREATHING: ICD-10-CM

## 2019-04-25 PROCEDURE — 94729 DIFFUSING CAPACITY: CPT | Performed by: INTERNAL MEDICINE

## 2019-04-25 PROCEDURE — 94060 EVALUATION OF WHEEZING: CPT

## 2019-04-25 PROCEDURE — 94729 DIFFUSING CAPACITY: CPT

## 2019-04-25 PROCEDURE — 94060 EVALUATION OF WHEEZING: CPT | Performed by: INTERNAL MEDICINE

## 2019-04-25 PROCEDURE — 94726 PLETHYSMOGRAPHY LUNG VOLUMES: CPT | Performed by: INTERNAL MEDICINE

## 2019-04-25 PROCEDURE — 94726 PLETHYSMOGRAPHY LUNG VOLUMES: CPT

## 2019-04-25 PROCEDURE — 94760 N-INVAS EAR/PLS OXIMETRY 1: CPT

## 2019-04-25 RX ORDER — SODIUM CHLORIDE FOR INHALATION 0.9 %
3 VIAL, NEBULIZER (ML) INHALATION ONCE
Status: DISCONTINUED | OUTPATIENT
Start: 2019-04-25 | End: 2019-04-29 | Stop reason: HOSPADM

## 2019-04-25 RX ORDER — LEVALBUTEROL 1.25 MG/.5ML
1.25 SOLUTION, CONCENTRATE RESPIRATORY (INHALATION) ONCE
Status: DISCONTINUED | OUTPATIENT
Start: 2019-04-25 | End: 2019-04-29 | Stop reason: HOSPADM

## 2019-05-06 ENCOUNTER — OFFICE VISIT (OUTPATIENT)
Dept: AUDIOLOGY | Facility: CLINIC | Age: 84
End: 2019-05-06

## 2019-05-06 DIAGNOSIS — H90.3 SENSORINEURAL HEARING LOSS, BILATERAL: Primary | ICD-10-CM

## 2019-10-07 ENCOUNTER — OFFICE VISIT (OUTPATIENT)
Dept: AUDIOLOGY | Facility: CLINIC | Age: 84
End: 2019-10-07
Payer: COMMERCIAL

## 2019-10-07 DIAGNOSIS — H90.3 SENSORINEURAL HEARING LOSS, BILATERAL: Primary | ICD-10-CM

## 2019-10-07 PROCEDURE — V5274 ALD UNSPECIFIED: HCPCS | Performed by: AUDIOLOGIST

## 2019-10-07 NOTE — PROGRESS NOTES
Hearing Aid Visit:    Name:  Nancy Haywood  :  1934  Age:  80 y o  Date of Evaluation: 10/07/19     Nancy Haywood is being seen for a hearing aid visit  Nancy Haywood is fit binaurally with Oticon Opn 3 miniRITE  hearing aid(s) serial number [id number] right/ serial number [id number] left  Warranty expires 2021 Patient reports an issue with the left rechargeable battery  It may have only been a one time issue  Action:  1  Went over the gold vs the silver batteries again  Provided written information   2  Decided to purchase a new pair of RC batteries  Provided  3  Did not change the program today, only changed left wax guard  The right one was clear  4  Canals were clear  Recommendations:   1  Made an appointment for audio/hearing aid check on 2020 as it will be two years since her initial audiogram   She will need an order for this evaluation from Dr Juan Erwin, Anais CONNOR , CCC-A, [place]# [id number], Hearing Aid Dispenser, [place]# [id number]  Clinical Audiologist

## 2020-01-28 ENCOUNTER — HOSPITAL ENCOUNTER (EMERGENCY)
Facility: HOSPITAL | Age: 85
Discharge: HOME | End: 2020-01-28
Attending: EMERGENCY MEDICINE | Admitting: EMERGENCY MEDICINE
Payer: MEDICARE

## 2020-01-28 ENCOUNTER — APPOINTMENT (EMERGENCY)
Dept: RADIOLOGY | Facility: HOSPITAL | Age: 85
End: 2020-01-28
Payer: MEDICARE

## 2020-01-28 VITALS
HEART RATE: 75 BPM | BODY MASS INDEX: 24.56 KG/M2 | OXYGEN SATURATION: 95 % | TEMPERATURE: 97.9 F | DIASTOLIC BLOOD PRESSURE: 63 MMHG | WEIGHT: 130 LBS | SYSTOLIC BLOOD PRESSURE: 138 MMHG | RESPIRATION RATE: 18 BRPM

## 2020-01-28 DIAGNOSIS — J20.9 ACUTE BRONCHITIS: ICD-10-CM

## 2020-01-28 DIAGNOSIS — J06.9 URI (UPPER RESPIRATORY INFECTION): Primary | ICD-10-CM

## 2020-01-28 LAB
ALBUMIN SERPL BCP-MCNC: 3.8 G/DL (ref 3.5–5)
ALP SERPL-CCNC: 74 U/L (ref 46–116)
ALT SERPL W P-5'-P-CCNC: 27 U/L (ref 12–78)
ANION GAP SERPL CALCULATED.3IONS-SCNC: 7 MMOL/L (ref 4–13)
APTT PPP: 28 SECONDS (ref 25–32)
AST SERPL W P-5'-P-CCNC: 27 U/L (ref 5–45)
ATRIAL RATE: 77 BPM
BACTERIA UR QL AUTO: ABNORMAL /HPF
BASOPHILS # BLD AUTO: 0.07 THOUSANDS/ΜL (ref 0–0.1)
BASOPHILS NFR BLD AUTO: 1 % (ref 0–1)
BILIRUB SERPL-MCNC: 0.3 MG/DL (ref 0.2–1)
BILIRUB UR QL STRIP: NEGATIVE
BUN SERPL-MCNC: 21 MG/DL (ref 5–25)
CALCIUM SERPL-MCNC: 9.2 MG/DL (ref 8.3–10.1)
CHLORIDE SERPL-SCNC: 103 MMOL/L (ref 100–108)
CLARITY UR: CLEAR
CO2 SERPL-SCNC: 29 MMOL/L (ref 21–32)
COLOR UR: ABNORMAL
CREAT SERPL-MCNC: 0.99 MG/DL (ref 0.6–1.3)
EOSINOPHIL # BLD AUTO: 0.58 THOUSAND/ΜL (ref 0–0.61)
EOSINOPHIL NFR BLD AUTO: 6 % (ref 0–6)
ERYTHROCYTE [DISTWIDTH] IN BLOOD BY AUTOMATED COUNT: 14.6 % (ref 11.6–15.1)
GFR SERPL CREATININE-BSD FRML MDRD: 52 ML/MIN/1.73SQ M
GLUCOSE SERPL-MCNC: 94 MG/DL (ref 65–140)
GLUCOSE UR STRIP-MCNC: NEGATIVE MG/DL
HCT VFR BLD AUTO: 40.2 % (ref 34.8–46.1)
HGB BLD-MCNC: 12.8 G/DL (ref 11.5–15.4)
HGB UR QL STRIP.AUTO: ABNORMAL
IMM GRANULOCYTES # BLD AUTO: 0.04 THOUSAND/UL (ref 0–0.2)
IMM GRANULOCYTES NFR BLD AUTO: 0 % (ref 0–2)
INR PPP: 0.98 (ref 0.91–1.09)
KETONES UR STRIP-MCNC: NEGATIVE MG/DL
LEUKOCYTE ESTERASE UR QL STRIP: ABNORMAL
LYMPHOCYTES # BLD AUTO: 2.73 THOUSANDS/ΜL (ref 0.6–4.47)
LYMPHOCYTES NFR BLD AUTO: 27 % (ref 14–44)
MAGNESIUM SERPL-MCNC: 1.9 MG/DL (ref 1.6–2.6)
MCH RBC QN AUTO: 32.1 PG (ref 26.8–34.3)
MCHC RBC AUTO-ENTMCNC: 31.8 G/DL (ref 31.4–37.4)
MCV RBC AUTO: 101 FL (ref 82–98)
MONOCYTES # BLD AUTO: 0.66 THOUSAND/ΜL (ref 0.17–1.22)
MONOCYTES NFR BLD AUTO: 7 % (ref 4–12)
NEUTROPHILS # BLD AUTO: 6.12 THOUSANDS/ΜL (ref 1.85–7.62)
NEUTS SEG NFR BLD AUTO: 59 % (ref 43–75)
NITRITE UR QL STRIP: NEGATIVE
NON-SQ EPI CELLS URNS QL MICRO: ABNORMAL /HPF
NRBC BLD AUTO-RTO: 0 /100 WBCS
NT-PROBNP SERPL-MCNC: 1204 PG/ML
P AXIS: 43 DEGREES
PH UR STRIP.AUTO: 7 [PH]
PHOSPHATE SERPL-MCNC: 3.4 MG/DL (ref 2.3–4.1)
PLATELET # BLD AUTO: 466 THOUSANDS/UL (ref 149–390)
PMV BLD AUTO: 10.7 FL (ref 8.9–12.7)
POTASSIUM SERPL-SCNC: 4.2 MMOL/L (ref 3.5–5.3)
PR INTERVAL: 146 MS
PROT SERPL-MCNC: 7.8 G/DL (ref 6.4–8.2)
PROT UR STRIP-MCNC: NEGATIVE MG/DL
PROTHROMBIN TIME: 10.5 SECONDS (ref 9.8–12)
QRS AXIS: 35 DEGREES
QRSD INTERVAL: 82 MS
QT INTERVAL: 374 MS
QTC INTERVAL: 423 MS
RBC # BLD AUTO: 3.99 MILLION/UL (ref 3.81–5.12)
RBC #/AREA URNS AUTO: ABNORMAL /HPF
SODIUM SERPL-SCNC: 139 MMOL/L (ref 136–145)
SP GR UR STRIP.AUTO: <=1.005 (ref 1–1.03)
T WAVE AXIS: 42 DEGREES
TROPONIN I SERPL-MCNC: <0.02 NG/ML
TROPONIN I SERPL-MCNC: <0.02 NG/ML
TSH SERPL DL<=0.05 MIU/L-ACNC: 1.08 UIU/ML (ref 0.36–3.74)
UROBILINOGEN UR QL STRIP.AUTO: 0.2 E.U./DL
VENTRICULAR RATE: 77 BPM
WBC # BLD AUTO: 10.2 THOUSAND/UL (ref 4.31–10.16)
WBC #/AREA URNS AUTO: ABNORMAL /HPF

## 2020-01-28 PROCEDURE — 36415 COLL VENOUS BLD VENIPUNCTURE: CPT | Performed by: EMERGENCY MEDICINE

## 2020-01-28 PROCEDURE — 93005 ELECTROCARDIOGRAM TRACING: CPT

## 2020-01-28 PROCEDURE — 93010 ELECTROCARDIOGRAM REPORT: CPT | Performed by: INTERNAL MEDICINE

## 2020-01-28 PROCEDURE — 84484 ASSAY OF TROPONIN QUANT: CPT | Performed by: EMERGENCY MEDICINE

## 2020-01-28 PROCEDURE — 94640 AIRWAY INHALATION TREATMENT: CPT

## 2020-01-28 PROCEDURE — 85610 PROTHROMBIN TIME: CPT | Performed by: EMERGENCY MEDICINE

## 2020-01-28 PROCEDURE — 80053 COMPREHEN METABOLIC PANEL: CPT | Performed by: EMERGENCY MEDICINE

## 2020-01-28 PROCEDURE — 83735 ASSAY OF MAGNESIUM: CPT | Performed by: EMERGENCY MEDICINE

## 2020-01-28 PROCEDURE — 84100 ASSAY OF PHOSPHORUS: CPT | Performed by: EMERGENCY MEDICINE

## 2020-01-28 PROCEDURE — 85025 COMPLETE CBC W/AUTO DIFF WBC: CPT | Performed by: EMERGENCY MEDICINE

## 2020-01-28 PROCEDURE — 83880 ASSAY OF NATRIURETIC PEPTIDE: CPT | Performed by: EMERGENCY MEDICINE

## 2020-01-28 PROCEDURE — 99285 EMERGENCY DEPT VISIT HI MDM: CPT

## 2020-01-28 PROCEDURE — 85730 THROMBOPLASTIN TIME PARTIAL: CPT | Performed by: EMERGENCY MEDICINE

## 2020-01-28 PROCEDURE — 84443 ASSAY THYROID STIM HORMONE: CPT | Performed by: EMERGENCY MEDICINE

## 2020-01-28 PROCEDURE — 96374 THER/PROPH/DIAG INJ IV PUSH: CPT

## 2020-01-28 PROCEDURE — 71045 X-RAY EXAM CHEST 1 VIEW: CPT

## 2020-01-28 PROCEDURE — 81001 URINALYSIS AUTO W/SCOPE: CPT | Performed by: EMERGENCY MEDICINE

## 2020-01-28 PROCEDURE — 99285 EMERGENCY DEPT VISIT HI MDM: CPT | Performed by: EMERGENCY MEDICINE

## 2020-01-28 RX ORDER — METHYLPREDNISOLONE SODIUM SUCCINATE 40 MG/ML
60 INJECTION, POWDER, LYOPHILIZED, FOR SOLUTION INTRAMUSCULAR; INTRAVENOUS ONCE
Status: COMPLETED | OUTPATIENT
Start: 2020-01-28 | End: 2020-01-28

## 2020-01-28 RX ORDER — NITROGLYCERIN 0.4 MG/1
0.4 TABLET SUBLINGUAL
Status: DISCONTINUED | OUTPATIENT
Start: 2020-01-28 | End: 2020-01-28

## 2020-01-28 RX ORDER — BENZONATATE 100 MG/1
100 CAPSULE ORAL EVERY 8 HOURS
Qty: 21 CAPSULE | Refills: 0 | Status: SHIPPED | OUTPATIENT
Start: 2020-01-28 | End: 2020-10-04

## 2020-01-28 RX ORDER — ALBUTEROL SULFATE 90 UG/1
2 AEROSOL, METERED RESPIRATORY (INHALATION) EVERY 4 HOURS PRN
Qty: 1 INHALER | Refills: 0 | Status: SHIPPED | OUTPATIENT
Start: 2020-01-28 | End: 2020-10-04

## 2020-01-28 RX ORDER — BENZONATATE 100 MG/1
100 CAPSULE ORAL ONCE
Status: COMPLETED | OUTPATIENT
Start: 2020-01-28 | End: 2020-01-28

## 2020-01-28 RX ORDER — PREDNISONE 50 MG/1
50 TABLET ORAL DAILY
Qty: 5 TABLET | Refills: 0 | Status: SHIPPED | OUTPATIENT
Start: 2020-01-28 | End: 2020-02-02

## 2020-01-28 RX ADMIN — IPRATROPIUM BROMIDE 0.5 MG: 0.5 SOLUTION RESPIRATORY (INHALATION) at 04:46

## 2020-01-28 RX ADMIN — ALBUTEROL SULFATE 5 MG: 2.5 SOLUTION RESPIRATORY (INHALATION) at 04:46

## 2020-01-28 RX ADMIN — METHYLPREDNISOLONE SODIUM SUCCINATE 60 MG: 40 INJECTION, POWDER, FOR SOLUTION INTRAMUSCULAR; INTRAVENOUS at 04:37

## 2020-01-28 RX ADMIN — BENZONATATE 100 MG: 100 CAPSULE ORAL at 04:41

## 2020-01-28 NOTE — ED PROVIDER NOTES
History  Chief Complaint   Patient presents with    Chest Pain     Pt states she had a squeezing pain in the middle of her chest that woke her from sleep  Took 0 5 of ativan w/ no relief  58-year-old female with past medical history of hypothyroidism, hypertension, anxiety, GERD, presents to the ER with complaint of congestion, sore throat, dry cough, shortness of breath over the past 3 days  About 2 hours prior to arrival patient woke up with shortness of breath and chest heaviness  Patient denies any chest pain  Patient took 0 5 mg of Ativan prior to arrival to the ED with no relief  Patient came to the ED for further evaluation  Patient states that she has been visiting her  who has been sick in the hospital over the past few weeks  While visiting her  2 days ago she started to feel ill herself  Patient denies any fevers  Patient is compliant with all of her medications at home  History provided by:  Patient  Chest Pain   Associated symptoms: cough and shortness of breath    Associated symptoms: no abdominal pain, no back pain, no dizziness, no fever, no headache, no nausea, no numbness, no palpitations and no weakness        Prior to Admission Medications   Prescriptions Last Dose Informant Patient Reported? Taking?    LORazepam (ATIVAN) 0 5 mg tablet 1/28/2020 at Unknown time  Yes Yes   Sig: Take 0 5 mg by mouth 2 (two) times a day as needed for anxiety     amLODIPine (NORVASC) 5 mg tablet 1/27/2020 at Unknown time  No Yes   Sig: Take 1 tablet (5 mg total) by mouth daily   hydrocortisone 1 % cream   No No   Sig: Apply topically 2 (two) times a day for 12 doses   levothyroxine 50 mcg tablet 1/27/2020 at Unknown time  Yes Yes   Sig: Take 75 mcg by mouth every other day     levothyroxine 50 mcg tablet 1/27/2020 at Unknown time  Yes Yes   Sig: Take 50 mcg by mouth every other day   lisinopril (ZESTRIL) 10 mg tablet Unknown at Unknown time  Yes No   Sig: Take 20 mg by mouth daily metoprolol succinate (TOPROL-XL) 25 mg 24 hr tablet 1/27/2020 at Unknown time  Yes Yes   Sig: Take 25 mg by mouth 2 (two) times a day     omeprazole (PriLOSEC) 20 mg delayed release capsule 1/27/2020 at Unknown time  Yes Yes   Sig: Take 20 mg by mouth daily  Facility-Administered Medications: None       Past Medical History:   Diagnosis Date    Anxiety     GERD (gastroesophageal reflux disease)     Hypertension     Hypothyroid        Past Surgical History:   Procedure Laterality Date    BREAST BIOPSY Left 1974    COLONOSCOPY  04/2018    HYSTERECTOMY         History reviewed  No pertinent family history  I have reviewed and agree with the history as documented  Social History     Tobacco Use    Smoking status: Former Smoker    Smokeless tobacco: Never Used   Substance Use Topics    Alcohol use: No    Drug use: No        Review of Systems   Constitutional: Negative for activity change, appetite change, chills and fever  HENT: Positive for congestion and sore throat  Negative for ear pain  Eyes: Negative for pain and discharge  Respiratory: Positive for cough and shortness of breath  Negative for chest tightness, wheezing and stridor  Cardiovascular: Negative for palpitations  Gastrointestinal: Negative for abdominal distention, abdominal pain, constipation, diarrhea and nausea  Endocrine: Negative for cold intolerance  Genitourinary: Negative for dysuria, frequency and urgency  Musculoskeletal: Negative for arthralgias and back pain  Skin: Negative for color change and rash  Allergic/Immunologic: Negative for environmental allergies and food allergies  Neurological: Negative for dizziness, weakness, numbness and headaches  Hematological: Negative for adenopathy  Psychiatric/Behavioral: Negative for agitation, behavioral problems and confusion  The patient is not nervous/anxious  All other systems reviewed and are negative        Physical Exam  Physical Exam Constitutional: She is oriented to person, place, and time  She appears well-developed and well-nourished  HENT:   Head: Normocephalic and atraumatic  Mouth/Throat: Oropharynx is clear and moist    Erythematous posterior oropharynx without any exudates   Eyes: Conjunctivae and EOM are normal    Neck: Normal range of motion  Neck supple  Cardiovascular: Normal rate, regular rhythm, normal heart sounds and intact distal pulses  Pulmonary/Chest: Effort normal and breath sounds normal    Lungs are clear to auscultation bilateral    Abdominal: Soft  Bowel sounds are normal  She exhibits no distension  There is no tenderness  Musculoskeletal: Normal range of motion  Pulses intact bilateral lower extremities  No pedal edema noted  Neurological: She is alert and oriented to person, place, and time  Skin: Skin is warm and dry  Psychiatric: She has a normal mood and affect  Her behavior is normal  Judgment and thought content normal    Nursing note and vitals reviewed        Vital Signs  ED Triage Vitals [01/28/20 0415]   Temperature Pulse Respirations Blood Pressure SpO2   97 9 °F (36 6 °C) 79 20 (!) 157/108 97 %      Temp Source Heart Rate Source Patient Position - Orthostatic VS BP Location FiO2 (%)   Oral Monitor Lying Left arm --      Pain Score       2           Vitals:    01/28/20 0415 01/28/20 0530 01/28/20 0545   BP: (!) 157/108  147/69   Pulse: 79 76 74   Patient Position - Orthostatic VS: Lying           Visual Acuity      ED Medications  Medications   ipratropium (ATROVENT) 0 02 % inhalation solution 0 5 mg (0 5 mg Nebulization Given 1/28/20 0446)   albuterol inhalation solution 5 mg (5 mg Nebulization Given 1/28/20 0446)   methylPREDNISolone sodium succinate (Solu-MEDROL) injection 60 mg (60 mg Intravenous Given 1/28/20 0437)   benzonatate (TESSALON PERLES) capsule 100 mg (100 mg Oral Given 1/28/20 0441)       Diagnostic Studies  Results Reviewed     Procedure Component Value Units Date/Time Urine Microscopic [[id number]]  (Abnormal) Collected:  01/28/20 0559    Lab Status:  Final result Specimen:  Urine, Clean Catch Updated:  01/28/20 0615     RBC, UA 0-1 /hpf      WBC, UA 2-4 /hpf      Epithelial Cells Occasional /hpf      Bacteria, UA Occasional /hpf     UA w Reflex to Microscopic w Reflex to Culture [[id number]]  (Abnormal) Collected:  01/28/20 0559    Lab Status:  Final result Specimen:  Urine, Clean Catch Updated:  01/28/20 0608     Color, UA Light Yellow     Clarity, UA Clear     Specific Gravity, UA <=1 005     pH, UA 7 0     Leukocytes, UA Trace     Nitrite, UA Negative     Protein, UA Negative mg/dl      Glucose, UA Negative mg/dl      Ketones, UA Negative mg/dl      Urobilinogen, UA 0 2 E U /dl      Bilirubin, UA Negative     Blood, UA Trace-Intact    Troponin I [[id number]]     Lab Status:  No result Specimen:  Blood     Phosphorus [[id number]]  (Normal) Collected:  01/28/20 0418    Lab Status:  Final result Specimen:  Blood from Arm, Left Updated:  01/28/20 0459     Phosphorus 3 4 mg/dL     Magnesium [[id number]]  (Normal) Collected:  01/28/20 0418    Lab Status:  Final result Specimen:  Blood from Arm, Left Updated:  01/28/20 0459     Magnesium 1 9 mg/dL     TSH, 3rd generation with Free T4 reflex [[id number]]  (Normal) Collected:  01/28/20 0418    Lab Status:  Final result Specimen:  Blood from Arm, Left Updated:  01/28/20 0459     TSH 3RD GENERATON 1 080 uIU/mL     Narrative:       Patients undergoing fluorescein dye angiography may retain small amounts of fluorescein in the body for 48-72 hours post procedure  Samples containing fluorescein can produce falsely depressed TSH values  If the patient had this procedure,a specimen should be resubmitted post fluorescein clearance        NT-BNP PRO [[id number]]  (Abnormal) Collected:  01/28/20 0418    Lab Status:  Final result Specimen:  Blood from Arm, Left Updated:  01/28/20 0459     NT-proBNP 1,204 pg/mL     Troponin I [[id number]]  (Normal) Collected:  01/28/20 0418    Lab Status:  Final result Specimen:  Blood from Arm, Left Updated:  01/28/20 0451     Troponin I <0 02 ng/mL     Comprehensive metabolic panel [[id number]] Collected:  01/28/20 0418    Lab Status:  Final result Specimen:  Blood from Arm, Left Updated:  01/28/20 0450     Sodium 139 mmol/L      Potassium 4 2 mmol/L      Chloride 103 mmol/L      CO2 29 mmol/L      ANION GAP 7 mmol/L      BUN 21 mg/dL      Creatinine 0 99 mg/dL      Glucose 94 mg/dL      Calcium 9 2 mg/dL      AST 27 U/L      ALT 27 U/L      Alkaline Phosphatase 74 U/L      Total Protein 7 8 g/dL      Albumin 3 8 g/dL      Total Bilirubin 0 30 mg/dL      eGFR 52 ml/min/1 73sq m     Narrative:       [place] guidelines for Chronic Kidney Disease (CKD):     Stage 1 with normal or high GFR (GFR > 90 mL/min/1 73 square meters)    Stage 2 Mild CKD (GFR = 60-89 mL/min/1 73 square meters)    Stage 3A Moderate CKD (GFR = 45-59 mL/min/1 73 square meters)    Stage 3B Moderate CKD (GFR = 30-44 mL/min/1 73 square meters)    Stage 4 Severe CKD (GFR = 15-29 mL/min/1 73 square meters)    Stage 5 End Stage CKD (GFR <15 mL/min/1 73 square meters)  Note: GFR calculation is accurate only with a steady state creatinine    Protime-INR [[id number]]  (Normal) Collected:  01/28/20 0418    Lab Status:  Final result Specimen:  Blood from Arm, Left Updated:  01/28/20 0447     Protime 10 5 seconds      INR 0 98    APTT [[id number]]  (Normal) Collected:  01/28/20 0418    Lab Status:  Final result Specimen:  Blood from Arm, Left Updated:  01/28/20 0447     PTT 28 seconds     CBC and differential [[id number]]  (Abnormal) Collected:  01/28/20 0418    Lab Status:  Final result Specimen:  Blood from Arm, Left Updated:  01/28/20 0426     WBC 10 20 Thousand/uL      RBC 3 99 Million/uL      Hemoglobin 12 8 g/dL      Hematocrit 40 2 %       fL      MCH 32 1 pg      MCHC 31 8 g/dL      RDW 14 6 %      MPV 10 7 fL      Platelets 466 Thousands/uL      nRBC 0 /100 WBCs      Neutrophils Relative 59 %      Immat GRANS % 0 %      Lymphocytes Relative 27 %      Monocytes Relative 7 %      Eosinophils Relative 6 %      Basophils Relative 1 %      Neutrophils Absolute 6 12 Thousands/µL      Immature Grans Absolute 0 04 Thousand/uL      Lymphocytes Absolute 2 73 Thousands/µL      Monocytes Absolute 0 66 Thousand/µL      Eosinophils Absolute 0 58 Thousand/µL      Basophils Absolute 0 07 Thousands/µL                  XR chest 1 view portable    (Results Pending)              Procedures  ECG 12 Lead Documentation Only  Date/Time: 1/28/2020 4:08 AM  Performed by: Noah Hastings DO  Authorized by: Noah Hastings DO     Indications / Diagnosis:  Chest pain   ECG reviewed by me, the ED Provider: yes    Patient location:  ED  Previous ECG:     Previous ECG:  Compared to current    Similarity:  No change    Comparison to cardiac monitor: Yes    Interpretation:     Interpretation: abnormal    Comments:      Sinus rhythm, rate 77, normal axis, normal intervals, no acute ST/T-wave abnormalities noted, Q-waves noted to V1 through V3 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 that is unchanged from previous study  ED Course  ED Course as of Jan 28 0626   Tue Jan 28, 2020   1179 Patient re-examined at bedside  Patient's shortness of breath is improved with steroids and neb treatment  Will obtain 2nd troponin and if negative then discharge patient home with treatment for acute bronchitis              HEART Risk Score      Most Recent Value   History  0 Filed at: 01/28/2020 0431   ECG  0 Filed at: 01/28/2020 0431   Age  2 Filed at: 01/28/2020 0431   Risk Factors  2 Filed at: 01/28/2020 0431   Troponin  0 Filed at: 01/28/2020 0431   Heart Score Risk Calculator   History  0 Filed at: 01/28/2020 0431   ECG  0 Filed at: 01/28/2020 0431   Age  2 Filed at: 01/28/2020 0431   Risk Factors  2 Filed at: 01/28/2020 0431   Troponin  0 Filed at: 01/28/2020 4460   HEART Score  4 Filed at: 01/28/2020 0431   HEART Score  4 Filed at: 01/28/2020 0431                            MDM  Number of Diagnoses or Management Options  Acute bronchitis: new and requires workup  URI (upper respiratory infection): new and requires workup  Diagnosis management comments: Obtain blood work, EKG, chest x-ray  Give steroids, neb treatment, Tessalon Perles and reassess symptoms       Amount and/or Complexity of Data Reviewed  Clinical lab tests: ordered and reviewed  Tests in the radiology section of CPT®: ordered and reviewed  Tests in the medicine section of CPT®: ordered and reviewed  Review and summarize past medical records: yes  Independent visualization of images, tracings, or specimens: yes    Risk of Complications, Morbidity, and/or Mortality  General comments: Patient's EKG did not show any acute abnormalities  It was unchanged from baseline  First troponin was negative  Patient's shortness of breath improved with neb treatment and steroids in the ED  At this point her symptoms are consistent with URI and acute bronchitis that is most likely viral in origin  I discussed supportive care  Patient is placed on prednisone burst as well as albuterol and Tessalon Perles  At this time care patient signed out to the next attending who will check a 2nd troponin and if negative then discharge patient home with medications as well as follow-up to PCP      Patient Progress  Patient progress: improved        Disposition  Final diagnoses:   URI (upper respiratory infection)   Acute bronchitis     Time reflects when diagnosis was documented in both MDM as applicable and the Disposition within this note     Time User Action Codes Description Comment    1/28/2020  5:55 AM Emy Ramesh Add [J06 9] URI (upper respiratory infection)     1/28/2020  5:55 AM Emy Ramesh Add [J20 9] Acute bronchitis       ED Disposition     None      Follow-up Information     Follow up With Specialties Details Why Contact Blair Farrell MD Internal Medicine In 2 days  [address]  [phone]            Patient's Medications   Discharge Prescriptions    ALBUTEROL (PROVENTIL HFA,VENTOLIN HFA) 90 MCG/ACT INHALER    Inhale 2 puffs every 4 (four) hours as needed for wheezing       Start Date: 1/28/2020 End Date: --       Order Dose: 2 puffs       Quantity: 1 Inhaler    Refills: 0    BENZONATATE (TESSALON PERLES) 100 MG CAPSULE    Take 1 capsule (100 mg total) by mouth every 8 (eight) hours       Start Date: 1/28/2020 End Date: --       Order Dose: 100 mg       Quantity: 21 capsule    Refills: 0    PREDNISONE 50 MG TABLET    Take 1 tablet (50 mg total) by mouth daily for 5 days       Start Date: 1/28/2020 End Date: 2/2/2020       Order Dose: 50 mg       Quantity: 5 tablet    Refills: 0     No discharge procedures on file      ED Provider  Electronically Signed by           Matthew Acosta DO  01/28/20 7163

## 2020-05-20 NOTE — PROGRESS NOTES
Runs of SVTs 150-180's while patient was in bathroom,associated with occipital tightness and nausea  BP was 180/90 manually  Patient denies HA,just tightness  Denies focal neuro deficit  Patient seen at bedside  Pupils equal to lights b/l  Will order IV hydralazine 10 mg x 1  CT head stat  Neuro checks q 4 hours x 1 day 
10

## 2020-10-04 ENCOUNTER — HOSPITAL ENCOUNTER (OUTPATIENT)
Facility: HOSPITAL | Age: 85
Setting detail: OBSERVATION
Discharge: HOME | End: 2020-10-05
Attending: EMERGENCY MEDICINE | Admitting: INTERNAL MEDICINE
Payer: MEDICARE

## 2020-10-04 ENCOUNTER — APPOINTMENT (EMERGENCY)
Dept: RADIOLOGY | Facility: HOSPITAL | Age: 85
End: 2020-10-04
Payer: MEDICARE

## 2020-10-04 DIAGNOSIS — G45.9 TIA (TRANSIENT ISCHEMIC ATTACK): Primary | ICD-10-CM

## 2020-10-04 DIAGNOSIS — I65.23 CAROTID STENOSIS, BILATERAL: ICD-10-CM

## 2020-10-04 DIAGNOSIS — I10 ESSENTIAL HYPERTENSION: ICD-10-CM

## 2020-10-04 DIAGNOSIS — I16.0 HYPERTENSIVE URGENCY: ICD-10-CM

## 2020-10-04 DIAGNOSIS — E03.9 HYPOTHYROID: ICD-10-CM

## 2020-10-04 LAB
ALBUMIN SERPL BCP-MCNC: 4 G/DL (ref 3.5–5)
ALP SERPL-CCNC: 75 U/L (ref 46–116)
ALT SERPL W P-5'-P-CCNC: 26 U/L (ref 12–78)
ANION GAP SERPL CALCULATED.3IONS-SCNC: 10 MMOL/L (ref 4–13)
APTT PPP: 30 SECONDS (ref 23–37)
AST SERPL W P-5'-P-CCNC: 32 U/L (ref 5–45)
BASOPHILS # BLD AUTO: 0.08 THOUSANDS/ΜL (ref 0–0.1)
BASOPHILS NFR BLD AUTO: 1 % (ref 0–1)
BILIRUB SERPL-MCNC: 0.4 MG/DL (ref 0.2–1)
BUN SERPL-MCNC: 35 MG/DL (ref 5–25)
CALCIUM SERPL-MCNC: 9.5 MG/DL (ref 8.3–10.1)
CHLORIDE SERPL-SCNC: 98 MMOL/L (ref 100–108)
CO2 SERPL-SCNC: 28 MMOL/L (ref 21–32)
CREAT SERPL-MCNC: 1.26 MG/DL (ref 0.6–1.3)
EOSINOPHIL # BLD AUTO: 0.41 THOUSAND/ΜL (ref 0–0.61)
EOSINOPHIL NFR BLD AUTO: 4 % (ref 0–6)
ERYTHROCYTE [DISTWIDTH] IN BLOOD BY AUTOMATED COUNT: 14.2 % (ref 11.6–15.1)
GFR SERPL CREATININE-BSD FRML MDRD: 39 ML/MIN/1.73SQ M
GLUCOSE SERPL-MCNC: 100 MG/DL (ref 65–140)
GLUCOSE SERPL-MCNC: 79 MG/DL (ref 65–140)
HCT VFR BLD AUTO: 42.4 % (ref 34.8–46.1)
HGB BLD-MCNC: 13.2 G/DL (ref 11.5–15.4)
IMM GRANULOCYTES # BLD AUTO: 0.02 THOUSAND/UL (ref 0–0.2)
IMM GRANULOCYTES NFR BLD AUTO: 0 % (ref 0–2)
INR PPP: 1.01 (ref 0.84–1.19)
LYMPHOCYTES # BLD AUTO: 2.26 THOUSANDS/ΜL (ref 0.6–4.47)
LYMPHOCYTES NFR BLD AUTO: 21 % (ref 14–44)
MCH RBC QN AUTO: 31.4 PG (ref 26.8–34.3)
MCHC RBC AUTO-ENTMCNC: 31.1 G/DL (ref 31.4–37.4)
MCV RBC AUTO: 101 FL (ref 82–98)
MONOCYTES # BLD AUTO: 0.73 THOUSAND/ΜL (ref 0.17–1.22)
MONOCYTES NFR BLD AUTO: 7 % (ref 4–12)
NEUTROPHILS # BLD AUTO: 7.25 THOUSANDS/ΜL (ref 1.85–7.62)
NEUTS SEG NFR BLD AUTO: 67 % (ref 43–75)
NRBC BLD AUTO-RTO: 0 /100 WBCS
PLATELET # BLD AUTO: 614 THOUSANDS/UL (ref 149–390)
PMV BLD AUTO: 11.5 FL (ref 8.9–12.7)
POTASSIUM SERPL-SCNC: 4.2 MMOL/L (ref 3.5–5.3)
PROT SERPL-MCNC: 8.3 G/DL (ref 6.4–8.2)
PROTHROMBIN TIME: 13.2 SECONDS (ref 11.6–14.5)
RBC # BLD AUTO: 4.2 MILLION/UL (ref 3.81–5.12)
SODIUM SERPL-SCNC: 136 MMOL/L (ref 136–145)
TROPONIN I SERPL-MCNC: <0.02 NG/ML
WBC # BLD AUTO: 10.75 THOUSAND/UL (ref 4.31–10.16)

## 2020-10-04 PROCEDURE — 36415 COLL VENOUS BLD VENIPUNCTURE: CPT | Performed by: EMERGENCY MEDICINE

## 2020-10-04 PROCEDURE — 84484 ASSAY OF TROPONIN QUANT: CPT | Performed by: EMERGENCY MEDICINE

## 2020-10-04 PROCEDURE — 85610 PROTHROMBIN TIME: CPT | Performed by: EMERGENCY MEDICINE

## 2020-10-04 PROCEDURE — 70496 CT ANGIOGRAPHY HEAD: CPT

## 2020-10-04 PROCEDURE — 70498 CT ANGIOGRAPHY NECK: CPT

## 2020-10-04 PROCEDURE — 87081 CULTURE SCREEN ONLY: CPT | Performed by: NURSE PRACTITIONER

## 2020-10-04 PROCEDURE — 99219 PR INITIAL OBSERVATION CARE/DAY 50 MINUTES: CPT | Performed by: INTERNAL MEDICINE

## 2020-10-04 PROCEDURE — 85730 THROMBOPLASTIN TIME PARTIAL: CPT | Performed by: EMERGENCY MEDICINE

## 2020-10-04 PROCEDURE — G1004 CDSM NDSC: HCPCS

## 2020-10-04 PROCEDURE — 99285 EMERGENCY DEPT VISIT HI MDM: CPT | Performed by: EMERGENCY MEDICINE

## 2020-10-04 PROCEDURE — 96374 THER/PROPH/DIAG INJ IV PUSH: CPT

## 2020-10-04 PROCEDURE — 99285 EMERGENCY DEPT VISIT HI MDM: CPT

## 2020-10-04 PROCEDURE — 82948 REAGENT STRIP/BLOOD GLUCOSE: CPT

## 2020-10-04 PROCEDURE — 96375 TX/PRO/DX INJ NEW DRUG ADDON: CPT

## 2020-10-04 PROCEDURE — 85025 COMPLETE CBC W/AUTO DIFF WBC: CPT | Performed by: EMERGENCY MEDICINE

## 2020-10-04 PROCEDURE — 93005 ELECTROCARDIOGRAM TRACING: CPT

## 2020-10-04 PROCEDURE — 80053 COMPREHEN METABOLIC PANEL: CPT | Performed by: EMERGENCY MEDICINE

## 2020-10-04 PROCEDURE — 1123F ACP DISCUSS/DSCN MKR DOCD: CPT | Performed by: EMERGENCY MEDICINE

## 2020-10-04 RX ORDER — ASPIRIN 325 MG
325 TABLET ORAL ONCE
Status: COMPLETED | OUTPATIENT
Start: 2020-10-04 | End: 2020-10-04

## 2020-10-04 RX ORDER — ATORVASTATIN CALCIUM 40 MG/1
40 TABLET, FILM COATED ORAL
Status: DISCONTINUED | OUTPATIENT
Start: 2020-10-05 | End: 2020-10-05

## 2020-10-04 RX ORDER — POLYETHYLENE GLYCOL 3350 17 G/17G
17 POWDER, FOR SOLUTION ORAL DAILY PRN
Status: DISCONTINUED | OUTPATIENT
Start: 2020-10-04 | End: 2020-10-05 | Stop reason: HOSPADM

## 2020-10-04 RX ORDER — ASPIRIN 325 MG
325 TABLET ORAL DAILY
Status: DISCONTINUED | OUTPATIENT
Start: 2020-10-05 | End: 2020-10-05

## 2020-10-04 RX ORDER — AMLODIPINE BESYLATE 5 MG/1
5 TABLET ORAL DAILY
Status: DISCONTINUED | OUTPATIENT
Start: 2020-10-04 | End: 2020-10-05 | Stop reason: HOSPADM

## 2020-10-04 RX ORDER — MECLIZINE HYDROCHLORIDE 25 MG/1
25 TABLET ORAL 3 TIMES DAILY PRN
Status: DISCONTINUED | OUTPATIENT
Start: 2020-10-04 | End: 2020-10-05 | Stop reason: HOSPADM

## 2020-10-04 RX ORDER — ACETAMINOPHEN 325 MG/1
650 TABLET ORAL EVERY 6 HOURS PRN
Status: DISCONTINUED | OUTPATIENT
Start: 2020-10-04 | End: 2020-10-05 | Stop reason: HOSPADM

## 2020-10-04 RX ORDER — METOPROLOL SUCCINATE 25 MG/1
25 TABLET, EXTENDED RELEASE ORAL EVERY 12 HOURS
Status: DISCONTINUED | OUTPATIENT
Start: 2020-10-04 | End: 2020-10-05 | Stop reason: HOSPADM

## 2020-10-04 RX ORDER — LORAZEPAM 0.5 MG/1
0.5 TABLET ORAL 2 TIMES DAILY PRN
Status: DISCONTINUED | OUTPATIENT
Start: 2020-10-04 | End: 2020-10-05 | Stop reason: HOSPADM

## 2020-10-04 RX ORDER — ONDANSETRON 2 MG/ML
4 INJECTION INTRAMUSCULAR; INTRAVENOUS EVERY 6 HOURS PRN
Status: DISCONTINUED | OUTPATIENT
Start: 2020-10-04 | End: 2020-10-05 | Stop reason: HOSPADM

## 2020-10-04 RX ORDER — PANTOPRAZOLE SODIUM 40 MG/1
40 TABLET, DELAYED RELEASE ORAL
Status: DISCONTINUED | OUTPATIENT
Start: 2020-10-05 | End: 2020-10-05 | Stop reason: HOSPADM

## 2020-10-04 RX ORDER — LEVOTHYROXINE SODIUM 0.05 MG/1
50 TABLET ORAL EVERY OTHER DAY
Status: DISCONTINUED | OUTPATIENT
Start: 2020-10-05 | End: 2020-10-05

## 2020-10-04 RX ORDER — DIPHENHYDRAMINE HYDROCHLORIDE 50 MG/ML
25 INJECTION INTRAMUSCULAR; INTRAVENOUS ONCE
Status: COMPLETED | OUTPATIENT
Start: 2020-10-04 | End: 2020-10-04

## 2020-10-04 RX ORDER — HEPARIN SODIUM 5000 [USP'U]/ML
5000 INJECTION, SOLUTION INTRAVENOUS; SUBCUTANEOUS EVERY 8 HOURS SCHEDULED
Status: DISCONTINUED | OUTPATIENT
Start: 2020-10-04 | End: 2020-10-05 | Stop reason: HOSPADM

## 2020-10-04 RX ORDER — METHYLPREDNISOLONE SODIUM SUCCINATE 125 MG/2ML
60 INJECTION, POWDER, LYOPHILIZED, FOR SOLUTION INTRAMUSCULAR; INTRAVENOUS ONCE
Status: COMPLETED | OUTPATIENT
Start: 2020-10-04 | End: 2020-10-04

## 2020-10-04 RX ORDER — MECLIZINE HYDROCHLORIDE 25 MG/1
25 TABLET ORAL 3 TIMES DAILY PRN
COMMUNITY

## 2020-10-04 RX ORDER — LEVOTHYROXINE SODIUM 0.07 MG/1
75 TABLET ORAL EVERY OTHER DAY
Status: DISCONTINUED | OUTPATIENT
Start: 2020-10-06 | End: 2020-10-05

## 2020-10-04 RX ORDER — LISINOPRIL AND HYDROCHLOROTHIAZIDE 20; 12.5 MG/1; MG/1
1 TABLET ORAL DAILY
Status: ON HOLD | COMMUNITY
End: 2020-10-05 | Stop reason: SDUPTHER

## 2020-10-04 RX ORDER — ATORVASTATIN CALCIUM 40 MG/1
40 TABLET, FILM COATED ORAL ONCE
Status: COMPLETED | OUTPATIENT
Start: 2020-10-04 | End: 2020-10-04

## 2020-10-04 RX ADMIN — AMLODIPINE BESYLATE 5 MG: 5 TABLET ORAL at 21:19

## 2020-10-04 RX ADMIN — IOHEXOL 85 ML: 350 INJECTION, SOLUTION INTRAVENOUS at 14:10

## 2020-10-04 RX ADMIN — ASPIRIN 325 MG: 325 TABLET, FILM COATED ORAL at 15:39

## 2020-10-04 RX ADMIN — METOPROLOL SUCCINATE 25 MG: 25 TABLET, EXTENDED RELEASE ORAL at 17:28

## 2020-10-04 RX ADMIN — DIPHENHYDRAMINE HYDROCHLORIDE 25 MG: 50 INJECTION, SOLUTION INTRAMUSCULAR; INTRAVENOUS at 13:16

## 2020-10-04 RX ADMIN — ATORVASTATIN CALCIUM 40 MG: 40 TABLET, FILM COATED ORAL at 15:39

## 2020-10-04 RX ADMIN — SODIUM CHLORIDE 1000 ML: 0.9 INJECTION, SOLUTION INTRAVENOUS at 15:37

## 2020-10-04 RX ADMIN — METHYLPREDNISOLONE SODIUM SUCCINATE 60 MG: 125 INJECTION, POWDER, FOR SOLUTION INTRAMUSCULAR; INTRAVENOUS at 13:08

## 2020-10-04 RX ADMIN — ACETAMINOPHEN 650 MG: 325 TABLET, FILM COATED ORAL at 21:18

## 2020-10-04 RX ADMIN — FAMOTIDINE 20 MG: 10 INJECTION, SOLUTION INTRAVENOUS at 13:12

## 2020-10-04 RX ADMIN — HEPARIN SODIUM 5000 UNITS: 5000 INJECTION INTRAVENOUS; SUBCUTANEOUS at 21:19

## 2020-10-05 ENCOUNTER — APPOINTMENT (OUTPATIENT)
Dept: NON INVASIVE DIAGNOSTICS | Facility: HOSPITAL | Age: 85
End: 2020-10-05
Payer: MEDICARE

## 2020-10-05 ENCOUNTER — APPOINTMENT (OUTPATIENT)
Dept: RADIOLOGY | Facility: HOSPITAL | Age: 85
End: 2020-10-05
Payer: MEDICARE

## 2020-10-05 VITALS
RESPIRATION RATE: 18 BRPM | TEMPERATURE: 98 F | DIASTOLIC BLOOD PRESSURE: 63 MMHG | BODY MASS INDEX: 23.68 KG/M2 | SYSTOLIC BLOOD PRESSURE: 110 MMHG | HEIGHT: 61 IN | OXYGEN SATURATION: 97 % | HEART RATE: 62 BPM | WEIGHT: 125.44 LBS

## 2020-10-05 PROBLEM — D72.829 LEUKOCYTOSIS: Status: ACTIVE | Noted: 2020-10-05

## 2020-10-05 LAB
ANION GAP SERPL CALCULATED.3IONS-SCNC: 9 MMOL/L (ref 4–13)
BUN SERPL-MCNC: 34 MG/DL (ref 5–25)
CALCIUM SERPL-MCNC: 8.8 MG/DL (ref 8.3–10.1)
CHLORIDE SERPL-SCNC: 103 MMOL/L (ref 100–108)
CHOLEST SERPL-MCNC: 236 MG/DL (ref 50–200)
CO2 SERPL-SCNC: 25 MMOL/L (ref 21–32)
CREAT SERPL-MCNC: 1.25 MG/DL (ref 0.6–1.3)
ERYTHROCYTE [DISTWIDTH] IN BLOOD BY AUTOMATED COUNT: 14.2 % (ref 11.6–15.1)
EST. AVERAGE GLUCOSE BLD GHB EST-MCNC: 111 MG/DL
GFR SERPL CREATININE-BSD FRML MDRD: 39 ML/MIN/1.73SQ M
GLUCOSE SERPL-MCNC: 112 MG/DL (ref 65–140)
HBA1C MFR BLD: 5.5 %
HCT VFR BLD AUTO: 39 % (ref 34.8–46.1)
HDLC SERPL-MCNC: 73 MG/DL
HGB BLD-MCNC: 12.3 G/DL (ref 11.5–15.4)
LDLC SERPL CALC-MCNC: 144 MG/DL (ref 0–100)
MAGNESIUM SERPL-MCNC: 1.8 MG/DL (ref 1.6–2.6)
MCH RBC QN AUTO: 32.2 PG (ref 26.8–34.3)
MCHC RBC AUTO-ENTMCNC: 31.5 G/DL (ref 31.4–37.4)
MCV RBC AUTO: 102 FL (ref 82–98)
NONHDLC SERPL-MCNC: 163 MG/DL
PLATELET # BLD AUTO: 554 THOUSANDS/UL (ref 149–390)
PMV BLD AUTO: 11.5 FL (ref 8.9–12.7)
POTASSIUM SERPL-SCNC: 4.4 MMOL/L (ref 3.5–5.3)
RBC # BLD AUTO: 3.82 MILLION/UL (ref 3.81–5.12)
SODIUM SERPL-SCNC: 137 MMOL/L (ref 136–145)
T4 FREE SERPL-MCNC: 1.31 NG/DL (ref 0.76–1.46)
TRIGL SERPL-MCNC: 97 MG/DL
TSH SERPL DL<=0.05 MIU/L-ACNC: 0.17 UIU/ML (ref 0.36–3.74)
WBC # BLD AUTO: 12.19 THOUSAND/UL (ref 4.31–10.16)

## 2020-10-05 PROCEDURE — 92523 SPEECH SOUND LANG COMPREHEN: CPT

## 2020-10-05 PROCEDURE — 99203 OFFICE O/P NEW LOW 30 MIN: CPT | Performed by: NURSE PRACTITIONER

## 2020-10-05 PROCEDURE — 85027 COMPLETE CBC AUTOMATED: CPT | Performed by: NURSE PRACTITIONER

## 2020-10-05 PROCEDURE — 83735 ASSAY OF MAGNESIUM: CPT | Performed by: NURSE PRACTITIONER

## 2020-10-05 PROCEDURE — 80048 BASIC METABOLIC PNL TOTAL CA: CPT | Performed by: NURSE PRACTITIONER

## 2020-10-05 PROCEDURE — 84439 ASSAY OF FREE THYROXINE: CPT | Performed by: NURSE PRACTITIONER

## 2020-10-05 PROCEDURE — 99217 PR OBSERVATION CARE DISCHARGE MANAGEMENT: CPT | Performed by: NURSE PRACTITIONER

## 2020-10-05 PROCEDURE — 97530 THERAPEUTIC ACTIVITIES: CPT

## 2020-10-05 PROCEDURE — G0008 ADMIN INFLUENZA VIRUS VAC: HCPCS | Performed by: NURSE PRACTITIONER

## 2020-10-05 PROCEDURE — 70551 MRI BRAIN STEM W/O DYE: CPT

## 2020-10-05 PROCEDURE — G1004 CDSM NDSC: HCPCS

## 2020-10-05 PROCEDURE — 97162 PT EVAL MOD COMPLEX 30 MIN: CPT

## 2020-10-05 PROCEDURE — 83036 HEMOGLOBIN GLYCOSYLATED A1C: CPT | Performed by: NURSE PRACTITIONER

## 2020-10-05 PROCEDURE — 97166 OT EVAL MOD COMPLEX 45 MIN: CPT

## 2020-10-05 PROCEDURE — 84443 ASSAY THYROID STIM HORMONE: CPT | Performed by: NURSE PRACTITIONER

## 2020-10-05 PROCEDURE — 97535 SELF CARE MNGMENT TRAINING: CPT

## 2020-10-05 PROCEDURE — 90662 IIV NO PRSV INCREASED AG IM: CPT | Performed by: NURSE PRACTITIONER

## 2020-10-05 PROCEDURE — 80061 LIPID PANEL: CPT | Performed by: NURSE PRACTITIONER

## 2020-10-05 RX ORDER — LISINOPRIL AND HYDROCHLOROTHIAZIDE 20; 12.5 MG/1; MG/1
1 TABLET ORAL DAILY
Refills: 0
Start: 2020-10-05 | End: 2020-10-05 | Stop reason: HOSPADM

## 2020-10-05 RX ORDER — LEVOTHYROXINE SODIUM 0.05 MG/1
50 TABLET ORAL
Status: DISCONTINUED | OUTPATIENT
Start: 2020-10-06 | End: 2020-10-05 | Stop reason: HOSPADM

## 2020-10-05 RX ORDER — ATORVASTATIN CALCIUM 80 MG/1
80 TABLET, FILM COATED ORAL
Qty: 30 TABLET | Refills: 0 | Status: SHIPPED | OUTPATIENT
Start: 2020-10-06 | End: 2021-02-10

## 2020-10-05 RX ORDER — AMLODIPINE BESYLATE 2.5 MG/1
2.5 TABLET ORAL DAILY
Qty: 30 TABLET | Refills: 0 | Status: SHIPPED | OUTPATIENT
Start: 2020-10-05 | End: 2020-10-16

## 2020-10-05 RX ORDER — ATORVASTATIN CALCIUM 80 MG/1
80 TABLET, FILM COATED ORAL
Status: DISCONTINUED | OUTPATIENT
Start: 2020-10-05 | End: 2020-10-05 | Stop reason: HOSPADM

## 2020-10-05 RX ORDER — ASPIRIN 81 MG/1
81 TABLET ORAL DAILY
Qty: 30 TABLET | Refills: 0 | Status: SHIPPED | OUTPATIENT
Start: 2020-10-06 | End: 2021-12-14

## 2020-10-05 RX ORDER — LEVOTHYROXINE SODIUM 0.05 MG/1
50 TABLET ORAL DAILY
Qty: 30 TABLET | Refills: 0 | Status: SHIPPED | OUTPATIENT
Start: 2020-10-05

## 2020-10-05 RX ORDER — ASPIRIN 81 MG/1
81 TABLET ORAL DAILY
Status: DISCONTINUED | OUTPATIENT
Start: 2020-10-06 | End: 2020-10-05 | Stop reason: HOSPADM

## 2020-10-05 RX ADMIN — PANTOPRAZOLE SODIUM 40 MG: 40 TABLET, DELAYED RELEASE ORAL at 05:26

## 2020-10-05 RX ADMIN — INFLUENZA A VIRUS A/MICHIGAN/45/2015 X-275 (H1N1) ANTIGEN (FORMALDEHYDE INACTIVATED), INFLUENZA A VIRUS A/SINGAPORE/INFIMH-16-0019/2016 IVR-186 (H3N2) ANTIGEN (FORMALDEHYDE INACTIVATED), INFLUENZA B VIRUS B/PHUKET/3073/2013 ANTIGEN (FORMALDEHYDE INACTIVATED), AND INFLUENZA B VIRUS B/MARYLAND/15/2016 BX-69A ANTIGEN (FORMALDEHYDE INACTIVATED) 0.7 ML: 60; 60; 60; 60 INJECTION, SUSPENSION INTRAMUSCULAR at 17:39

## 2020-10-05 RX ADMIN — METOPROLOL SUCCINATE 25 MG: 25 TABLET, EXTENDED RELEASE ORAL at 05:26

## 2020-10-05 RX ADMIN — LEVOTHYROXINE SODIUM 50 MCG: 50 TABLET ORAL at 08:37

## 2020-10-05 RX ADMIN — HEPARIN SODIUM 5000 UNITS: 5000 INJECTION INTRAVENOUS; SUBCUTANEOUS at 14:48

## 2020-10-05 RX ADMIN — ASPIRIN 325 MG: 325 TABLET ORAL at 08:37

## 2020-10-05 RX ADMIN — HEPARIN SODIUM 5000 UNITS: 5000 INJECTION INTRAVENOUS; SUBCUTANEOUS at 05:26

## 2020-10-05 RX ADMIN — AMLODIPINE BESYLATE 5 MG: 5 TABLET ORAL at 08:37

## 2020-10-05 RX ADMIN — ATORVASTATIN CALCIUM 80 MG: 80 TABLET, FILM COATED ORAL at 16:39

## 2020-10-05 RX ADMIN — MAGNESIUM OXIDE TAB 400 MG (241.3 MG ELEMENTAL MG) 800 MG: 400 (241.3 MG) TAB at 16:39

## 2020-10-06 LAB
ATRIAL RATE: 63 BPM
MRSA NOSE QL CULT: NORMAL
P AXIS: 32 DEGREES
PR INTERVAL: 136 MS
QRS AXIS: -5 DEGREES
QRSD INTERVAL: 82 MS
QT INTERVAL: 402 MS
QTC INTERVAL: 411 MS
T WAVE AXIS: 10 DEGREES
VENTRICULAR RATE: 63 BPM

## 2020-10-06 PROCEDURE — 93010 ELECTROCARDIOGRAM REPORT: CPT

## 2020-10-07 ENCOUNTER — TELEPHONE (OUTPATIENT)
Dept: NEUROLOGY | Facility: CLINIC | Age: 85
End: 2020-10-07

## 2020-10-15 ENCOUNTER — TELEPHONE (OUTPATIENT)
Dept: VASCULAR SURGERY | Facility: CLINIC | Age: 85
End: 2020-10-15

## 2020-10-16 ENCOUNTER — CONSULT (OUTPATIENT)
Dept: VASCULAR SURGERY | Facility: CLINIC | Age: 85
End: 2020-10-16
Payer: MEDICARE

## 2020-10-16 VITALS
SYSTOLIC BLOOD PRESSURE: 128 MMHG | DIASTOLIC BLOOD PRESSURE: 74 MMHG | HEART RATE: 71 BPM | HEIGHT: 61 IN | RESPIRATION RATE: 18 BRPM | WEIGHT: 125 LBS | TEMPERATURE: 97.5 F | BODY MASS INDEX: 23.6 KG/M2

## 2020-10-16 DIAGNOSIS — I10 ESSENTIAL HYPERTENSION: ICD-10-CM

## 2020-10-16 DIAGNOSIS — I65.23 BILATERAL CAROTID ARTERY STENOSIS: Primary | ICD-10-CM

## 2020-10-16 DIAGNOSIS — G45.9 TIA (TRANSIENT ISCHEMIC ATTACK): ICD-10-CM

## 2020-10-16 DIAGNOSIS — I65.23 CAROTID STENOSIS, BILATERAL: ICD-10-CM

## 2020-10-16 PROCEDURE — 99204 OFFICE O/P NEW MOD 45 MIN: CPT | Performed by: PHYSICIAN ASSISTANT

## 2020-10-16 RX ORDER — AMLODIPINE BESYLATE 2.5 MG/1
2.5 TABLET ORAL DAILY
Qty: 30 TABLET | Refills: 0 | Status: SHIPPED | OUTPATIENT
Start: 2020-10-16 | End: 2021-01-13

## 2020-11-04 ENCOUNTER — HOSPITAL ENCOUNTER (OUTPATIENT)
Dept: RADIOLOGY | Facility: HOSPITAL | Age: 85
Discharge: HOME | End: 2020-11-04
Payer: MEDICARE

## 2020-11-04 DIAGNOSIS — I10 ESSENTIAL HYPERTENSION: ICD-10-CM

## 2020-11-04 DIAGNOSIS — I65.23 BILATERAL CAROTID ARTERY STENOSIS: ICD-10-CM

## 2020-11-04 DIAGNOSIS — G45.9 TIA (TRANSIENT ISCHEMIC ATTACK): ICD-10-CM

## 2020-11-04 DIAGNOSIS — I65.23 CAROTID STENOSIS, BILATERAL: ICD-10-CM

## 2020-11-04 PROCEDURE — 93880 EXTRACRANIAL BILAT STUDY: CPT

## 2020-11-04 PROCEDURE — 93880 EXTRACRANIAL BILAT STUDY: CPT | Performed by: SURGERY

## 2020-11-11 ENCOUNTER — TRANSCRIBE ORDERS (OUTPATIENT)
Dept: LAB | Facility: CLINIC | Age: 85
End: 2020-11-11

## 2020-11-11 ENCOUNTER — LAB (OUTPATIENT)
Dept: LAB | Facility: CLINIC | Age: 85
End: 2020-11-11
Payer: MEDICARE

## 2020-11-11 DIAGNOSIS — I10 ESSENTIAL HYPERTENSION: Primary | ICD-10-CM

## 2020-11-11 DIAGNOSIS — I25.119 CORONARY ARTERY DISEASE WITH ANGINA PECTORIS, UNSPECIFIED VESSEL OR LESION TYPE, UNSPECIFIED WHETHER NATIVE OR TRANSPLANTED HEART (HCC): ICD-10-CM

## 2020-11-11 DIAGNOSIS — I10 ESSENTIAL HYPERTENSION: ICD-10-CM

## 2020-11-11 DIAGNOSIS — D64.9 ANEMIA, UNSPECIFIED TYPE: ICD-10-CM

## 2020-11-11 LAB
ALBUMIN SERPL BCP-MCNC: 3.7 G/DL (ref 3.5–5)
ALP SERPL-CCNC: 635 U/L (ref 46–116)
ALT SERPL W P-5'-P-CCNC: 226 U/L (ref 12–78)
ANION GAP SERPL CALCULATED.3IONS-SCNC: 5 MMOL/L (ref 4–13)
AST SERPL W P-5'-P-CCNC: 150 U/L (ref 5–45)
BACTERIA UR QL AUTO: ABNORMAL /HPF
BASOPHILS # BLD AUTO: 0.21 THOUSANDS/ΜL (ref 0–0.1)
BASOPHILS NFR BLD AUTO: 1 % (ref 0–1)
BILIRUB SERPL-MCNC: 0.85 MG/DL (ref 0.2–1)
BILIRUB UR QL STRIP: ABNORMAL
BUN SERPL-MCNC: 14 MG/DL (ref 5–25)
CALCIUM SERPL-MCNC: 10 MG/DL (ref 8.3–10.1)
CHLORIDE SERPL-SCNC: 108 MMOL/L (ref 100–108)
CHOLEST SERPL-MCNC: 214 MG/DL (ref 50–200)
CLARITY UR: ABNORMAL
CO2 SERPL-SCNC: 29 MMOL/L (ref 21–32)
COLOR UR: ABNORMAL
CREAT SERPL-MCNC: 0.92 MG/DL (ref 0.6–1.3)
EOSINOPHIL # BLD AUTO: 1.8 THOUSAND/ΜL (ref 0–0.61)
EOSINOPHIL NFR BLD AUTO: 12 % (ref 0–6)
ERYTHROCYTE [DISTWIDTH] IN BLOOD BY AUTOMATED COUNT: 15.3 % (ref 11.6–15.1)
ERYTHROCYTE [SEDIMENTATION RATE] IN BLOOD: 46 MM/HOUR (ref 0–29)
GFR SERPL CREATININE-BSD FRML MDRD: 57 ML/MIN/1.73SQ M
GLUCOSE P FAST SERPL-MCNC: 88 MG/DL (ref 65–99)
GLUCOSE UR STRIP-MCNC: NEGATIVE MG/DL
HCT VFR BLD AUTO: 42.9 % (ref 34.8–46.1)
HDLC SERPL-MCNC: 105 MG/DL
HGB BLD-MCNC: 13.2 G/DL (ref 11.5–15.4)
HGB UR QL STRIP.AUTO: ABNORMAL
IMM GRANULOCYTES # BLD AUTO: 0.09 THOUSAND/UL (ref 0–0.2)
IMM GRANULOCYTES NFR BLD AUTO: 1 % (ref 0–2)
IRON SERPL-MCNC: 102 UG/DL (ref 50–170)
KETONES UR STRIP-MCNC: NEGATIVE MG/DL
LDLC SERPL CALC-MCNC: 94 MG/DL (ref 0–100)
LEUKOCYTE ESTERASE UR QL STRIP: ABNORMAL
LYMPHOCYTES # BLD AUTO: 2.89 THOUSANDS/ΜL (ref 0.6–4.47)
LYMPHOCYTES NFR BLD AUTO: 19 % (ref 14–44)
MAGNESIUM SERPL-MCNC: 2.2 MG/DL (ref 1.6–2.6)
MCH RBC QN AUTO: 31.7 PG (ref 26.8–34.3)
MCHC RBC AUTO-ENTMCNC: 30.8 G/DL (ref 31.4–37.4)
MCV RBC AUTO: 103 FL (ref 82–98)
MONOCYTES # BLD AUTO: 0.97 THOUSAND/ΜL (ref 0.17–1.22)
MONOCYTES NFR BLD AUTO: 6 % (ref 4–12)
NEUTROPHILS # BLD AUTO: 9.51 THOUSANDS/ΜL (ref 1.85–7.62)
NEUTS SEG NFR BLD AUTO: 61 % (ref 43–75)
NITRITE UR QL STRIP: NEGATIVE
NON-SQ EPI CELLS URNS QL MICRO: ABNORMAL /HPF
NONHDLC SERPL-MCNC: 109 MG/DL
NRBC BLD AUTO-RTO: 0 /100 WBCS
NT-PROBNP SERPL-MCNC: 505 PG/ML
PH UR STRIP.AUTO: 6 [PH]
PLATELET # BLD AUTO: 613 THOUSANDS/UL (ref 149–390)
PMV BLD AUTO: 12.2 FL (ref 8.9–12.7)
POTASSIUM SERPL-SCNC: 4 MMOL/L (ref 3.5–5.3)
PROT SERPL-MCNC: 8.3 G/DL (ref 6.4–8.2)
PROT UR STRIP-MCNC: ABNORMAL MG/DL
RBC # BLD AUTO: 4.16 MILLION/UL (ref 3.81–5.12)
RBC #/AREA URNS AUTO: ABNORMAL /HPF
SODIUM SERPL-SCNC: 142 MMOL/L (ref 136–145)
SP GR UR STRIP.AUTO: 1.02 (ref 1–1.03)
TRIGL SERPL-MCNC: 73 MG/DL
TSH SERPL DL<=0.05 MIU/L-ACNC: 1.7 UIU/ML (ref 0.36–3.74)
UROBILINOGEN UR QL STRIP.AUTO: 0.2 E.U./DL
WBC # BLD AUTO: 15.47 THOUSAND/UL (ref 4.31–10.16)
WBC #/AREA URNS AUTO: ABNORMAL /HPF

## 2020-11-11 PROCEDURE — 85025 COMPLETE CBC W/AUTO DIFF WBC: CPT

## 2020-11-11 PROCEDURE — 80061 LIPID PANEL: CPT

## 2020-11-11 PROCEDURE — 83540 ASSAY OF IRON: CPT

## 2020-11-11 PROCEDURE — 83880 ASSAY OF NATRIURETIC PEPTIDE: CPT

## 2020-11-11 PROCEDURE — 85652 RBC SED RATE AUTOMATED: CPT

## 2020-11-11 PROCEDURE — 83735 ASSAY OF MAGNESIUM: CPT

## 2020-11-11 PROCEDURE — 36415 COLL VENOUS BLD VENIPUNCTURE: CPT

## 2020-11-11 PROCEDURE — 84443 ASSAY THYROID STIM HORMONE: CPT

## 2020-11-11 PROCEDURE — 80053 COMPREHEN METABOLIC PANEL: CPT

## 2020-11-11 PROCEDURE — 81001 URINALYSIS AUTO W/SCOPE: CPT

## 2020-11-16 ENCOUNTER — LAB (OUTPATIENT)
Dept: LAB | Facility: CLINIC | Age: 85
End: 2020-11-16
Payer: MEDICARE

## 2020-11-16 DIAGNOSIS — K75.9 HEPATITIS: Primary | ICD-10-CM

## 2020-11-16 LAB
D DIMER PPP FEU-MCNC: 0.66 UG/ML FEU
NT-PROBNP SERPL-MCNC: 650 PG/ML

## 2020-11-16 PROCEDURE — 36415 COLL VENOUS BLD VENIPUNCTURE: CPT

## 2020-11-16 PROCEDURE — 85379 FIBRIN DEGRADATION QUANT: CPT

## 2020-11-16 PROCEDURE — 83880 ASSAY OF NATRIURETIC PEPTIDE: CPT

## 2020-12-02 ENCOUNTER — TRANSCRIBE ORDERS (OUTPATIENT)
Dept: LAB | Facility: CLINIC | Age: 85
End: 2020-12-02

## 2020-12-02 ENCOUNTER — LAB (OUTPATIENT)
Dept: LAB | Facility: CLINIC | Age: 85
End: 2020-12-02
Payer: MEDICARE

## 2020-12-02 DIAGNOSIS — R94.5 NONSPECIFIC ABNORMAL RESULTS OF LIVER FUNCTION STUDY: Primary | ICD-10-CM

## 2020-12-02 LAB
ALBUMIN SERPL BCP-MCNC: 3.2 G/DL (ref 3.5–5)
ALP SERPL-CCNC: 940 U/L (ref 46–116)
ALT SERPL W P-5'-P-CCNC: 254 U/L (ref 12–78)
AST SERPL W P-5'-P-CCNC: 231 U/L (ref 5–45)
BILIRUB DIRECT SERPL-MCNC: 0.34 MG/DL (ref 0–0.2)
BILIRUB SERPL-MCNC: 0.59 MG/DL (ref 0.2–1)
GGT SERPL-CCNC: 961 U/L (ref 5–85)
HBV CORE AB SER QL: NORMAL
HBV CORE IGM SER QL: NORMAL
HBV SURFACE AG SER QL: NORMAL
HCV AB SER QL: NORMAL
PROT SERPL-MCNC: 7.9 G/DL (ref 6.4–8.2)

## 2020-12-02 PROCEDURE — 86704 HEP B CORE ANTIBODY TOTAL: CPT

## 2020-12-02 PROCEDURE — 86705 HEP B CORE ANTIBODY IGM: CPT

## 2020-12-02 PROCEDURE — 87340 HEPATITIS B SURFACE AG IA: CPT

## 2020-12-02 PROCEDURE — 36415 COLL VENOUS BLD VENIPUNCTURE: CPT

## 2020-12-02 PROCEDURE — 82977 ASSAY OF GGT: CPT

## 2020-12-02 PROCEDURE — 86038 ANTINUCLEAR ANTIBODIES: CPT

## 2020-12-02 PROCEDURE — 80076 HEPATIC FUNCTION PANEL: CPT

## 2020-12-02 PROCEDURE — 86256 FLUORESCENT ANTIBODY TITER: CPT

## 2020-12-02 PROCEDURE — 86803 HEPATITIS C AB TEST: CPT

## 2020-12-03 LAB — MITOCHONDRIA M2 IGG SER-ACNC: <20 UNITS (ref 0–20)

## 2020-12-04 LAB — RYE IGE QN: NEGATIVE

## 2020-12-14 ENCOUNTER — TRANSCRIBE ORDERS (OUTPATIENT)
Dept: ADMINISTRATIVE | Facility: HOSPITAL | Age: 85
End: 2020-12-14

## 2020-12-14 DIAGNOSIS — R94.5 ABNORMAL RESULTS OF LIVER FUNCTION STUDIES: Primary | ICD-10-CM

## 2021-01-05 ENCOUNTER — HOSPITAL ENCOUNTER (OUTPATIENT)
Dept: RADIOLOGY | Facility: HOSPITAL | Age: 86
Discharge: HOME | End: 2021-01-05
Attending: INTERNAL MEDICINE
Payer: MEDICARE

## 2021-01-05 DIAGNOSIS — R94.5 ABNORMAL RESULTS OF LIVER FUNCTION STUDIES: ICD-10-CM

## 2021-01-05 PROCEDURE — 76705 ECHO EXAM OF ABDOMEN: CPT

## 2021-01-13 ENCOUNTER — OFFICE VISIT (OUTPATIENT)
Dept: GASTROENTEROLOGY | Facility: CLINIC | Age: 86
End: 2021-01-13
Payer: MEDICARE

## 2021-01-13 VITALS
HEIGHT: 61 IN | SYSTOLIC BLOOD PRESSURE: 170 MMHG | DIASTOLIC BLOOD PRESSURE: 105 MMHG | HEART RATE: 77 BPM | BODY MASS INDEX: 24.17 KG/M2 | WEIGHT: 128 LBS

## 2021-01-13 DIAGNOSIS — K76.89 LIVER CYST: ICD-10-CM

## 2021-01-13 DIAGNOSIS — Z86.010 HISTORY OF COLON POLYPS: ICD-10-CM

## 2021-01-13 DIAGNOSIS — K57.90 DIVERTICULAR DISEASE: ICD-10-CM

## 2021-01-13 DIAGNOSIS — R93.89 ABNORMAL ULTRASOUND: ICD-10-CM

## 2021-01-13 DIAGNOSIS — R79.89 ABNORMAL LFTS (LIVER FUNCTION TESTS): Primary | ICD-10-CM

## 2021-01-13 PROCEDURE — 99214 OFFICE O/P EST MOD 30 MIN: CPT | Performed by: INTERNAL MEDICINE

## 2021-01-13 RX ORDER — ATORVASTATIN CALCIUM 80 MG/1
80 TABLET, FILM COATED ORAL DAILY
COMMUNITY
Start: 2020-11-24 | End: 2021-01-13

## 2021-01-13 RX ORDER — AMLODIPINE BESYLATE 2.5 MG/1
2.5 TABLET ORAL DAILY
COMMUNITY
Start: 2020-11-24 | End: 2021-12-14

## 2021-01-13 RX ORDER — ASPIRIN 81 MG/1
81 TABLET ORAL
COMMUNITY
Start: 2020-10-06 | End: 2021-01-13

## 2021-01-13 RX ORDER — METOPROLOL SUCCINATE 25 MG/1
TABLET, EXTENDED RELEASE ORAL
COMMUNITY
Start: 2020-11-04

## 2021-01-13 RX ORDER — LEVOTHYROXINE SODIUM 0.05 MG/1
50 TABLET ORAL DAILY
COMMUNITY
Start: 2020-11-24 | End: 2021-01-13

## 2021-01-13 NOTE — PROGRESS NOTES
[facility] - Outpatient Follow-up Note  Allegra Lafleur 80 y o  female MRN: [id number]  Encounter: [id number]          ASSESSMENT AND PLAN:      1  Abnormal LFTs (liver function tests)  -     MRI abdomen w wo contrast and mrcp; Future; Expected date: 01/13/2021  -     Hepatic function panel; Future    2  Liver cyst    3  Abnormal ultrasound    4  Diverticular disease    5  History of colon polyps    Elevated LFTs AST ALT ALP, very similar to October, AMA and ANDREZ are negative  Remains asymptomatic  Ultrasound unrevealing except for a small cyst in the liver  Still etiology remains unclear, doubt it is from Lipitor but nevertheless will stop it for now  Repeat LFTs and check MRI/MRCP to ensure there is no liver mass and/or biliary pathology  follow-up in my office in a month  Discussed at length with patient's daughter     ______________________________________________________________________    SUBJECTIVE:    History of elevated LFTs, persistent stool, denies any abdominal pain nausea vomiting itching jaundice dark urine light stools appetite is fair weight stable  Denies any fever chills rash, diet medications more than 10 pertinent systems reviewed, bowels are regular  Prior colonoscopy biopsy results some of the records at an imaging noted  REVIEW OF SYSTEMS IS OTHERWISE NEGATIVE        Historical Information   Past Medical History:   Diagnosis Date    Anxiety     GERD (gastroesophageal reflux disease)     Hypertension     Hypothyroid     Vertigo      Past Surgical History:   Procedure Laterality Date    BREAST BIOPSY Left 1974    COLONOSCOPY  04/2018    HYSTERECTOMY       Social History   Social History     Substance and Sexual Activity   Alcohol Use Not Currently    Frequency: Never     Social History     Substance and Sexual Activity   Drug Use No     Social History     Tobacco Use   Smoking Status Former Smoker   Smokeless Tobacco Never Used     History reviewed  No pertinent family history  Meds/Allergies       Current Outpatient Medications:     amLODIPine (NORVASC) 2 5 mg tablet    LORazepam (ATIVAN) 0 5 mg tablet    metoprolol succinate (TOPROL-XL) 25 mg 24 hr tablet    aspirin (ECOTRIN LOW STRENGTH) 81 mg EC tablet    atorvastatin (LIPITOR) 80 mg tablet    levothyroxine 50 mcg tablet    meclizine (ANTIVERT) 25 mg tablet    omeprazole (PriLOSEC) 20 mg delayed release capsule    Allergies   Allergen Reactions    Iodinated Diagnostic Agents     Dye Fdc Red [Red Dye] Palpitations           Objective     Blood pressure (!) 170/105, pulse 77, height 5' 1" (1 549 m), weight 58 1 kg (128 lb), not currently breastfeeding  Body mass index is 24 19 kg/m²  PHYSICAL EXAM:      General Appearance:   Alert, cooperative, no distress   HEENT:   Normocephalic, atraumatic, anicteric  Neck:  Supple, symmetrical, trachea midline   Lungs:   Clear to auscultation bilaterally; no rales, rhonchi or wheezing; respirations unlabored    Heart[de-identified]   Regular rate and rhythm; no murmur  Abdomen:   Soft, non-tender, non-distended; normal bowel sounds; no masses, no organomegaly    Genitalia:   Deferred    Rectal:   Deferred    Extremities:  No cyanosis, clubbing or edema    Skin:  No jaundice, rashes, or lesions    Lymph nodes:  No palpable cervical lymphadenopathy        Lab Results:   No visits with results within 1 Day(s) from this visit     Latest known visit with results is:   Transcribe Orders on 12/02/2020   Component Date Value    GGT 12/02/2020 961*    Total Bilirubin 12/02/2020 0 59     Bilirubin, Direct 12/02/2020 0 34*    Alkaline Phosphatase 12/02/2020 940*    AST 12/02/2020 231*    ALT 12/02/2020 254*    Total Protein 12/02/2020 7 9     Albumin 12/02/2020 3 2*    Hepatitis B Surface Ag 12/02/2020 Non-reactive     Hepatitis C Ab 12/02/2020 Non-reactive     Hep B C IgM 12/02/2020 Non-reactive     Hep B Core Total Ab 12/02/2020 Non-reactive     ANDREZ 12/02/2020 Negative     Mitochondrial Ab 12/02/2020 <20 0          Radiology Results:   Us Abdomen Limited    Result Date: 1/5/2021  Narrative: RIGHT UPPER QUADRANT ULTRASOUND INDICATION:    R94 5: Abnormal results of liver function studies  COMPARISON:  CT 1/22/2019 TECHNIQUE:   Real-time ultrasound of the right upper quadrant was performed with a curvilinear transducer with both volumetric sweeps and still imaging techniques  FINDINGS: PANCREAS:  Visualized portions of the pancreas are within normal limits  AORTA AND IVC:  Visualized portions are normal for patient age  LIVER: Size:  Within normal range  The liver measures 15 0 cm in the midclavicular line  Contour:  Surface contour is smooth  Parenchyma:  Echogenicity and echotexture are within normal limits  No evidence of suspicious mass  Right lobe lower medial subcapsular 0 6 cm cyst Limited imaging of the main portal vein shows it to be patent and hepatopetal   BILIARY: No gallbladder findings  No intrahepatic biliary dilatation  CBD measures 4 mm  No choledocholithiasis  Negative Amaya's sign KIDNEY: Right kidney measures 9 5 x 4 8 cm  0 2 cm cortical hyperechoic focus with twinkle artifact; nonobstructing calculus again noted  ASCITES:   None       Impression: No significant finding Workstation performed: [id number]

## 2021-02-05 ENCOUNTER — TRANSCRIBE ORDERS (OUTPATIENT)
Dept: ADMINISTRATIVE | Facility: HOSPITAL | Age: 86
End: 2021-02-05

## 2021-02-05 ENCOUNTER — LAB (OUTPATIENT)
Dept: LAB | Facility: HOSPITAL | Age: 86
End: 2021-02-05
Attending: INTERNAL MEDICINE
Payer: MEDICARE

## 2021-02-05 DIAGNOSIS — R79.89 ABNORMAL LFTS (LIVER FUNCTION TESTS): ICD-10-CM

## 2021-02-05 LAB
ALBUMIN SERPL BCP-MCNC: 3.3 G/DL (ref 3.5–5)
ALP SERPL-CCNC: 164 U/L (ref 46–116)
ALT SERPL W P-5'-P-CCNC: 38 U/L (ref 12–78)
AST SERPL W P-5'-P-CCNC: 33 U/L (ref 5–45)
BILIRUB DIRECT SERPL-MCNC: 0.11 MG/DL (ref 0–0.2)
BILIRUB SERPL-MCNC: 0.2 MG/DL (ref 0.2–1)
PROT SERPL-MCNC: 7.5 G/DL (ref 6.4–8.2)

## 2021-02-05 PROCEDURE — 80076 HEPATIC FUNCTION PANEL: CPT

## 2021-02-05 PROCEDURE — 36415 COLL VENOUS BLD VENIPUNCTURE: CPT

## 2021-02-08 ENCOUNTER — HOSPITAL ENCOUNTER (OUTPATIENT)
Dept: RADIOLOGY | Facility: HOSPITAL | Age: 86
Discharge: HOME | End: 2021-02-08
Attending: INTERNAL MEDICINE
Payer: MEDICARE

## 2021-02-08 DIAGNOSIS — R79.89 ABNORMAL LFTS (LIVER FUNCTION TESTS): ICD-10-CM

## 2021-02-08 PROCEDURE — A9585 GADOBUTROL INJECTION: HCPCS | Performed by: INTERNAL MEDICINE

## 2021-02-08 PROCEDURE — G1004 CDSM NDSC: HCPCS

## 2021-02-08 PROCEDURE — 74183 MRI ABD W/O CNTR FLWD CNTR: CPT

## 2021-02-08 PROCEDURE — 76376 3D RENDER W/INTRP POSTPROCES: CPT

## 2021-02-08 RX ADMIN — GADOBUTROL 6 ML: 604.72 INJECTION INTRAVENOUS at 11:07

## 2021-02-10 ENCOUNTER — OFFICE VISIT (OUTPATIENT)
Dept: GASTROENTEROLOGY | Facility: CLINIC | Age: 86
End: 2021-02-10
Payer: MEDICARE

## 2021-02-10 VITALS
SYSTOLIC BLOOD PRESSURE: 182 MMHG | HEART RATE: 68 BPM | DIASTOLIC BLOOD PRESSURE: 90 MMHG | BODY MASS INDEX: 23.6 KG/M2 | WEIGHT: 125 LBS | HEIGHT: 61 IN

## 2021-02-10 DIAGNOSIS — E78.00 ELEVATED CHOLESTEROL: ICD-10-CM

## 2021-02-10 DIAGNOSIS — K21.9 GASTROESOPHAGEAL REFLUX DISEASE WITHOUT ESOPHAGITIS: ICD-10-CM

## 2021-02-10 DIAGNOSIS — R74.8 ABNORMAL LIVER ENZYMES: Primary | ICD-10-CM

## 2021-02-10 PROCEDURE — 99214 OFFICE O/P EST MOD 30 MIN: CPT | Performed by: NURSE PRACTITIONER

## 2021-02-10 RX ORDER — ATORVASTATIN CALCIUM 10 MG/1
20 TABLET, FILM COATED ORAL DAILY
Qty: 60 TABLET | Refills: 0 | Status: SHIPPED | OUTPATIENT
Start: 2021-02-10 | End: 2021-03-17

## 2021-02-10 NOTE — PATIENT INSTRUCTIONS
Take 1 tablet daily atorvastatin for 2 weeks then increase to 2 tablets daily for 2 weeks  Then have blood work rechecked (liver enzymes)

## 2021-02-10 NOTE — PROGRESS NOTES
[facility] - Outpatient Follow-up Note  Joseph Welch 80 y o  female MRN: [id number]  Encounter: [id number]          ASSESSMENT AND PLAN:      1  Abnormal liver enzymes  Patient is currently asymptomatic concerning elevated liver enzymes  Elevated liver enzymes may have been secondary to medication, infection, or stone which has since passed  Total bilirubin 0 11, AST 33 and ALT 38 and within normal limits  Alk-phos remains elevated at 194 but significantly decreased from previous level of 940     - Hepatic function panel; will recheck in 1 month after patient restarts atorvastatin and increases dosage to 20 mg daily  -MRI abdomen with and without contrast and MRCP completed results are pending patient informed to call office Monday for results of tests  2  Elevated cholesterol  Patient has history of elevated cholesterol daughter is concerned that she is currently on no medication for this  Will restart atorvastatin at lower dose  Start atorvastatin as directed:   - atorvastatin (LIPITOR) 10 mg tablet; Take 2 tablets (20 mg total) by mouth daily   Take 1 tablet daily for 2 weeks then increase to 2 tablets daily for 2 weeks then have repeat liver enzymes done if liver enzymes are elevated will need to discontinue medication and look for alternative medication for elevated cholesterol  If enzymes remain within normal limits will continue medication at a lower dose (patient was previously on 80 mg daily)  3  GERD  Symptoms of well controlled on omeprazole 20 mg daily  Will continue current medication regimen  Will  re-evaluation symptoms on follow-up  Follow-up in 1 month after blood work is drawn   ______________________________________________________________________    SUBJECTIVEHPI:   Tammy Cervantes is a 55-year-old female with past medical history of SVT, hypertension, TIA, and elevated cholesterol who presents to office for follow-up for abnormal liver enzymes  Patient had MRI of abdomen with and without contrast and MRCP to evaluate for possible biliary obstruction  MRI was completed on 02/08/2021 results are still pending  Ultrasound of abdomen done 01/05/2021 showed no significant findings  Common bile duct measured 4 mm  No intrahepatic biliary dilation  ANDREZ negative, AMA <20 0  Chronic Hepatitis panel negative  Liver enzymes are trending down  Total bilirubin 0 11, AST 33 and ALT 38 and within normal limits  Alk-phos remains elevated at 194 but significantly decreased from previous level of 940  Patient is asymptomatic  Patient denies nausea, vomiting, acid reflux, heartburn, dysphagia, epigastric or abdominal pain  Patient denies blood in stool, blood from rectal area, or black tarry stool  No fever or chills  No rash or itching  Last colonoscopy done 04/26/2018 which showed single small sigmoid polyp and left-sided diverticulosis  Biopsy showed sigmoid polyp hyperplastic polyp  Last EGD done on 09/30/2015  EGD showed mild gastritis  Patient is on omeprazole 20 mg daily which keeps her reflux symptoms under control  Blood pressure was elevated on office visit but patient was asymptomatic  Patient is on no antiplatelet or anticoagulation medication except aspirin 81 mg daily  No family history of gastric or colon cancer  REVIEW OF SYSTEMS IS OTHERWISE NEGATIVE        Historical Information   Past Medical History:   Diagnosis Date    Anxiety     GERD (gastroesophageal reflux disease)     Hypertension     Hypothyroid     Vertigo      Past Surgical History:   Procedure Laterality Date    BREAST BIOPSY Left 1974    COLONOSCOPY  04/2018    HYSTERECTOMY       Social History   Social History     Substance and Sexual Activity   Alcohol Use Not Currently    Frequency: Never     Social History     Substance and Sexual Activity   Drug Use No     Social History     Tobacco Use   Smoking Status Former Smoker   Smokeless Tobacco Never Used     History reviewed  No pertinent family history  Meds/Allergies       Current Outpatient Medications:     amLODIPine (NORVASC) 2 5 mg tablet    aspirin (ECOTRIN LOW STRENGTH) 81 mg EC tablet    levothyroxine 50 mcg tablet    LORazepam (ATIVAN) 0 5 mg tablet    meclizine (ANTIVERT) 25 mg tablet    metoprolol succinate (TOPROL-XL) 25 mg 24 hr tablet    omeprazole (PriLOSEC) 20 mg delayed release capsule    atorvastatin (LIPITOR) 10 mg tablet    Allergies   Allergen Reactions    Iodinated Diagnostic Agents     Dye Fdc Red [Red Dye] Palpitations           Objective     Blood pressure (!) 182/90, pulse 68, height 5' 1" (1 549 m), weight 56 7 kg (125 lb), not currently breastfeeding  Body mass index is 23 62 kg/m²  PHYSICAL EXAM:      General Appearance:   Alert, cooperative, no distress   HEENT:   Normocephalic, atraumatic, anicteric      Neck:  Supple, symmetrical, trachea midline   Lungs:   Clear to auscultation bilaterally; no rales, rhonchi or wheezing; respirations unlabored    Heart[de-identified]   Regular rate and rhythm; no murmur, rub, or gallop  Abdomen:   Soft, non-tender, non-distended; normal bowel sounds; no masses, no organomegaly    Genitalia:   Deferred    Rectal:   Deferred    Extremities:  No cyanosis, clubbing or edema    Pulses:  2+ and symmetric    Skin:  No jaundice, rashes, or lesions    Lymph nodes:  No palpable cervical lymphadenopathy        Lab Results:   No visits with results within 1 Day(s) from this visit  Latest known visit with results is:   Lab on 02/05/2021   Component Date Value    Total Bilirubin 02/05/2021 0 20     Bilirubin, Direct 02/05/2021 0 11     Alkaline Phosphatase 02/05/2021 164*    AST 02/05/2021 33     ALT 02/05/2021 38     Total Protein 02/05/2021 7 5     Albumin 02/05/2021 3 3*         Radiology Results:   No results found

## 2021-02-11 ENCOUNTER — IMMUNIZATIONS (OUTPATIENT)
Dept: FAMILY MEDICINE CLINIC | Facility: HOSPITAL | Age: 86
End: 2021-02-11

## 2021-02-11 DIAGNOSIS — Z23 ENCOUNTER FOR IMMUNIZATION: Primary | ICD-10-CM

## 2021-02-11 PROCEDURE — 91301 SARS-COV-2 / COVID-19 MRNA VACCINE (MODERNA) 100 MCG: CPT

## 2021-02-11 PROCEDURE — 0011A SARS-COV-2 / COVID-19 MRNA VACCINE (MODERNA) 100 MCG: CPT

## 2021-03-09 ENCOUNTER — TELEPHONE (OUTPATIENT)
Dept: GASTROENTEROLOGY | Facility: CLINIC | Age: 86
End: 2021-03-09

## 2021-03-09 NOTE — TELEPHONE ENCOUNTER
LMOM for pt to call back to reschedule appointment  Had to cancel as Dr Susan Holley will not be in office this day  If she calls back please reschedule  Thank you

## 2021-03-11 ENCOUNTER — IMMUNIZATIONS (OUTPATIENT)
Dept: FAMILY MEDICINE CLINIC | Facility: HOSPITAL | Age: 86
End: 2021-03-11

## 2021-03-11 DIAGNOSIS — Z23 ENCOUNTER FOR IMMUNIZATION: Primary | ICD-10-CM

## 2021-03-11 PROCEDURE — 0012A SARS-COV-2 / COVID-19 MRNA VACCINE (MODERNA) 100 MCG: CPT

## 2021-03-11 PROCEDURE — 91301 SARS-COV-2 / COVID-19 MRNA VACCINE (MODERNA) 100 MCG: CPT

## 2021-03-16 NOTE — TELEPHONE ENCOUNTER
Tried calling patient again, however I needed to enter a number to leave a message  Will try again later in the week

## 2021-03-17 DIAGNOSIS — E78.00 ELEVATED CHOLESTEROL: ICD-10-CM

## 2021-03-17 RX ORDER — ATORVASTATIN CALCIUM 10 MG/1
TABLET, FILM COATED ORAL
Qty: 60 TABLET | Refills: 0 | Status: SHIPPED | OUTPATIENT
Start: 2021-03-17 | End: 2021-04-16 | Stop reason: SDUPTHER

## 2021-04-16 DIAGNOSIS — E78.00 ELEVATED CHOLESTEROL: Primary | ICD-10-CM

## 2021-04-16 RX ORDER — ATORVASTATIN CALCIUM 10 MG/1
20 TABLET, FILM COATED ORAL DAILY
Qty: 30 TABLET | Refills: 0 | Status: SHIPPED | OUTPATIENT
Start: 2021-04-16

## 2021-04-16 NOTE — TELEPHONE ENCOUNTER
Patient reminded to get hepatic function panel lab drawn, will refill current dose, follow up in office 5/5 and if LFTs are stable may increase dose at that time

## 2021-04-23 ENCOUNTER — TRANSCRIBE ORDERS (OUTPATIENT)
Dept: ADMINISTRATIVE | Facility: HOSPITAL | Age: 86
End: 2021-04-23

## 2021-04-23 ENCOUNTER — APPOINTMENT (OUTPATIENT)
Dept: LAB | Facility: HOSPITAL | Age: 86
End: 2021-04-23
Payer: MEDICARE

## 2021-04-23 DIAGNOSIS — R74.8 ABNORMAL LIVER ENZYMES: ICD-10-CM

## 2021-04-23 LAB
ALBUMIN SERPL BCP-MCNC: 3.7 G/DL (ref 3.5–5)
ALP SERPL-CCNC: 168 U/L (ref 46–116)
ALT SERPL W P-5'-P-CCNC: 39 U/L (ref 12–78)
AST SERPL W P-5'-P-CCNC: 35 U/L (ref 5–45)
BILIRUB DIRECT SERPL-MCNC: 0.1 MG/DL (ref 0–0.2)
BILIRUB SERPL-MCNC: 0.52 MG/DL (ref 0.2–1)
PROT SERPL-MCNC: 7.7 G/DL (ref 6.4–8.2)

## 2021-04-23 PROCEDURE — 80076 HEPATIC FUNCTION PANEL: CPT

## 2021-04-23 PROCEDURE — 36415 COLL VENOUS BLD VENIPUNCTURE: CPT

## 2021-05-05 ENCOUNTER — OFFICE VISIT (OUTPATIENT)
Dept: GASTROENTEROLOGY | Facility: CLINIC | Age: 86
End: 2021-05-05
Payer: MEDICARE

## 2021-05-05 VITALS
WEIGHT: 125 LBS | HEART RATE: 74 BPM | BODY MASS INDEX: 23.6 KG/M2 | SYSTOLIC BLOOD PRESSURE: 173 MMHG | DIASTOLIC BLOOD PRESSURE: 89 MMHG | HEIGHT: 61 IN

## 2021-05-05 DIAGNOSIS — E78.00 ELEVATED CHOLESTEROL: ICD-10-CM

## 2021-05-05 DIAGNOSIS — K57.90 DIVERTICULAR DISEASE: ICD-10-CM

## 2021-05-05 DIAGNOSIS — R74.8 ABNORMAL LIVER ENZYMES: Primary | ICD-10-CM

## 2021-05-05 PROCEDURE — 99214 OFFICE O/P EST MOD 30 MIN: CPT | Performed by: INTERNAL MEDICINE

## 2021-05-05 NOTE — PROGRESS NOTES
[facility] - Outpatient Follow-up Note  Vinicio Vila 80 y o  female MRN: [id number]  Encounter: [id number]          ASSESSMENT AND PLAN:      1  Abnormal liver enzymes  -     Hepatic function panel; Future    2  Elevated cholesterol    3  Diverticular disease       Patient with history of marked elevation of LFTs, recent LFTs are almost normal except for mild elevation of ALP  Extensive evaluation for any other liver disease to include hepatitis  ANDREZ, AMA serologies negative, ultrasound MR unrevealing as well  Most likely her elevation of LFTs was for medication possibly Lipitor or transient viral infection  I do not see the need for any further workup at this time  Can repeat LFTs in 6 months and go from there  She may use Tylenol for aches and pains up to  2 pills a day  May continue Lipitor 20 mg a day and follow up LFTs periodically  Discussed above plan at length with patient and her daughter, many questions answered  ______________________________________________________________________  SUBJECTIVE:     80-year-old pleasant lady, history of elevated LFTs, feeling better, denies any GI symptoms of abdominal pain nausea vomiting dysphagia coughing choking spells itching easy bleeding bruising, appetite is fair weight stable bowels are regular  Denies any fever chills rash chest pains shortness of breath coughing choking spells nocturnal symptoms  Reasonably active alert independent for age, somewhat hard of hearing  REVIEW OF SYSTEMS IS OTHERWISE NEGATIVE        Historical Information   Past Medical History:   Diagnosis Date    Anxiety     GERD (gastroesophageal reflux disease)     Hypertension     Hypothyroid     Vertigo      Past Surgical History:   Procedure Laterality Date    BREAST BIOPSY Left 1974    COLONOSCOPY  04/2018    HYSTERECTOMY       Social History   Social History     Substance and Sexual Activity   Alcohol Use Not Currently    Frequency: Never     Social History     Substance and Sexual Activity   Drug Use No     Social History     Tobacco Use   Smoking Status Former Smoker   Smokeless Tobacco Never Used     History reviewed  No pertinent family history  Meds/Allergies       Current Outpatient Medications:     amLODIPine (NORVASC) 2 5 mg tablet    atorvastatin (LIPITOR) 10 mg tablet    levothyroxine 50 mcg tablet    LORazepam (ATIVAN) 0 5 mg tablet    meclizine (ANTIVERT) 25 mg tablet    metoprolol succinate (TOPROL-XL) 25 mg 24 hr tablet    omeprazole (PriLOSEC) 20 mg delayed release capsule    aspirin (ECOTRIN LOW STRENGTH) 81 mg EC tablet    Allergies   Allergen Reactions    Iodinated Diagnostic Agents     Dye Fdc Red [Red Dye - Food Allergy] Palpitations           Objective     Blood pressure (!) 173/89, pulse 74, height 5' 1" (1 549 m), weight 56 7 kg (125 lb), not currently breastfeeding  Body mass index is 23 62 kg/m²  PHYSICAL EXAM:      General Appearance:   Alert, cooperative, no distress   HEENT:   Normocephalic, atraumatic, anicteric  Neck:  Supple, symmetrical, trachea midline   Lungs:   Clear to auscultation bilaterally; no rales, rhonchi or wheezing; respirations unlabored    Heart[de-identified]   Regular rate and rhythm; no murmur  Abdomen:   Soft, non-tender, non-distended; normal bowel sounds; no masses, no organomegaly    Genitalia:   Deferred    Rectal:   Deferred    Extremities:  No cyanosis, clubbing or edema    Skin:  No jaundice, rashes, or lesions    Lymph nodes:  No palpable cervical lymphadenopathy        Lab Results:   No visits with results within 1 Day(s) from this visit     Latest known visit with results is:   Appointment on 04/23/2021   Component Date Value    Total Bilirubin 04/23/2021 0 52     Bilirubin, Direct 04/23/2021 0 10     Alkaline Phosphatase 04/23/2021 168*    AST 04/23/2021 35     ALT 04/23/2021 39     Total Protein 04/23/2021 7 7     Albumin 04/23/2021 3 7          Radiology Results: No results found

## 2021-06-04 ENCOUNTER — OFFICE VISIT (OUTPATIENT)
Dept: VASCULAR SURGERY | Facility: CLINIC | Age: 86
End: 2021-06-04
Payer: MEDICARE

## 2021-06-04 VITALS
HEART RATE: 90 BPM | BODY MASS INDEX: 23.79 KG/M2 | TEMPERATURE: 98 F | DIASTOLIC BLOOD PRESSURE: 84 MMHG | WEIGHT: 126 LBS | SYSTOLIC BLOOD PRESSURE: 120 MMHG | HEIGHT: 61 IN

## 2021-06-04 DIAGNOSIS — I10 ESSENTIAL HYPERTENSION: ICD-10-CM

## 2021-06-04 DIAGNOSIS — G45.9 TIA (TRANSIENT ISCHEMIC ATTACK): ICD-10-CM

## 2021-06-04 DIAGNOSIS — I65.23 BILATERAL CAROTID ARTERY STENOSIS: Primary | ICD-10-CM

## 2021-06-04 PROCEDURE — 99215 OFFICE O/P EST HI 40 MIN: CPT | Performed by: PHYSICIAN ASSISTANT

## 2021-06-04 RX ORDER — ROSUVASTATIN CALCIUM 40 MG/1
40 TABLET, COATED ORAL EVERY EVENING
COMMUNITY
Start: 2021-05-30

## 2021-06-04 NOTE — PROGRESS NOTES
Assessment/Plan:    Bilateral carotid artery stenosis    -Hx TIA (Oct '20) ? Hypertensive etiology  -no new sx of stroke/TIA  -Significant LFT elevation thought to be due to excessive APAP with atorvastatin  -Now on rosuvastatin      -CV duplex 11/4/2020: R  50-69% 134/30 3/1 51;  L  < 50% 83/28    -CTA 10/4/2020:  B CAROL 65-70%; lumen 1 5 mm at maximal stenosis    -MRI brain 10/5/20: Mild chronic microvascular disease, age-related atrophy    Plan:  Patient with history of TIA unlikely due to carotid artery disease  Although CTA shows narrowing, the velocities on CV duplex remain low  We will continue with routine duplex surveillance, routine clinical monitoring and optimally tolerated medical therapy  -will continue with routine duplex surveillance of CAROL  -recommend CV duplex biannually   -continue with aspirin 81 and rosuvastatin 40  -we reviewed the sx of stroke for which she should call 911      Hypertension  -BP is stable in the office today  -continue with optimal medical therapy as per PCP    Dyslipidemia  -continue with statin therapy / LFT monitoring per PCP  Subjective:      Patient ID: Shawnee Del Rosario is a 80 y o  female  Patient presents today to review carotid study done 11/4/21  Denies any s/s of CVA  HPI    Shawnee Del Rosario 81 y/o F  Hypertension, vertigo, hypothyroidism, TIA (Oc '20) with left temporal HA, dizziness, lightheadedness and left facial twitching  Sx lasted for 5 minutes  Work up [employer] which showed internal carotid artery stenosis 65-70% bilaterally  Patient is referred to to vascular surgery for further evaluation of carotid artery stenosis  Review of neurology note suggests symptoms may have been due to hypertensive urgency  At time of discharge, she was started on aspirin 81 mg and atorvastatin 80 mg      6/4/2021:  Elderly patient returns for office visit accompanied by her daughter and to review carotid duplex study which was performed 11/04/2020    She has had no further neurological events, hospitalizations or significant medical changes  No symptoms of TIA/stroke  She denies transient visual loss, burry vision, difficulty speaking, facial numbness/weakness and arm/leg weakness  We reviewed the symptoms of stroke for which she should call 911  We reviewed the CV duplex study which does not exactly correlate to the CTA scan  CTA head and neck  10/04/2020    IMPRESSION:     CT brain: Mild age-related cerebral volume loss  No acute intracranial abnormality      CT angiography: Atherosclerotic disease involving the proximal cervical internal carotid arteries bilaterally  The lumen measures approximately 1 5 mm at the point of maximal stenosis suggesting a stenosis of approximately 65-70% bilaterally      Mild intracranial atherosclerotic disease involving both posterior cerebral arteries, right slightly greater than left  The right arises from the internal carotid artery          VAS CV duplex 11/4/2020  FINDINGS:     Right        Impression  PSV  EDV (cm/s)  Direction of Flow  Ratio    Dist  ICA                 46          16                      0 52    Mid  ICA                 100          27                      1 13    Prox  ICA    50 - 69%    134          33                      1 51    Dist CCA                  70          20                              Mid CCA                   89          14                      1 53    Prox CCA                  58          13                      1 19    Ext Carotid               69          15                      0 78    Prox Vert                 49           0  Antegrade                   Subclavian                80           0                              Innominate                49           0                                 Left         Impression  PSV  EDV (cm/s)  Direction of Flow  Ratio    Dist  ICA                135          33                      2 15    Mid   ICA                  99          34 1 58    Prox  ICA    1 - 49%      83          28                              Dist CCA                  59          19                              Mid CCA                   63          18                      0 90    Prox CCA                  70          18                              Ext Carotid               45           6                      0 72    Prox Vert                 51          17  Antegrade                   Subclavian               105           0                                       CONCLUSION:     Impression  RIGHT:  There is 50-69% stenosis noted in the internal carotid artery  Plaque is  heterogenous and irregular  Vertebral artery flow is antegrade  There is no significant subclavian artery  disease  LEFT:  There is <50% stenosis noted in the internal carotid artery  Plaque is  heterogenous and irregular  Vertebral artery flow is antegrade  There is no significant subclavian artery  disease  Compared to previous study on 5/7/218 , there is mild increase in disease  Recommend repeat testing in 6month as per protocol unless otherwise indicated  MRI brain 10/05/2020  IMPRESSION:  Mild chronic microvascular ischemic disease, consistent with CTA     No acute ischemic disease     Age-related atrophy     Right mastoid effusion          The following portions of the patient's history were reviewed and updated as appropriate: allergies, current medications, past family history, past medical history, past social history, past surgical history and problem list     Review of Systems   Constitutional: Negative  HENT: Negative  Eyes: Negative  Respiratory: Negative  Cardiovascular: Negative  Gastrointestinal: Negative  Endocrine: Negative  Genitourinary: Negative  Musculoskeletal: Negative  Skin: Negative  Allergic/Immunologic: Negative  Neurological: Negative  Hematological: Negative  Psychiatric/Behavioral: Negative            Objective:      BP 120/84 (BP Location: Right arm, Patient Position: Sitting)   Pulse 90   Temp 98 °F (36 7 °C) (Tympanic)   Ht 5' 1" (1 549 m)   Wt 57 2 kg (126 lb)   BMI 23 81 kg/m²        Physical Exam  Vitals signs and nursing note reviewed  Constitutional:       Appearance: She is well-developed  HENT:      Head: Normocephalic and atraumatic  Eyes:      Pupils: Pupils are equal, round, and reactive to light  Neck:      Musculoskeletal: Neck supple  Thyroid: No thyromegaly  Vascular: No JVD  Trachea: Trachea normal    Cardiovascular:      Rate and Rhythm: Normal rate and regular rhythm  Pulses:           Carotid pulses are 2+ on the right side and 2+ on the left side  Radial pulses are 2+ on the right side and 2+ on the left side  Dorsalis pedis pulses are 2+ on the right side and 2+ on the left side  Heart sounds: Normal heart sounds, S1 normal and S2 normal  No murmur  No friction rub  No gallop  Pulmonary:      Effort: Pulmonary effort is normal  No accessory muscle usage or respiratory distress  Breath sounds: Normal breath sounds  No wheezing or rales  Abdominal:      General: Bowel sounds are normal  There is no distension  Palpations: Abdomen is soft  Tenderness: There is no abdominal tenderness  Musculoskeletal: Normal range of motion  General: No deformity  Skin:     General: Skin is warm and dry  Findings: No lesion or rash  Nails: There is no clubbing  Neurological:      Mental Status: She is alert and oriented to person, place, and time  Comments: Grossly normal    Psychiatric:         Behavior: Behavior is cooperative  I have reviewed and made appropriate changes to the review of systems input by the medical assistant      Vitals:    06/04/21 1157   BP: 120/84   BP Location: Right arm   Patient Position: Sitting   Pulse: 90   Temp: 98 °F (36 7 °C)   TempSrc: Tympanic   Weight: 57 2 kg (126 lb)   Height: 5' 1" (1 549 m)       Patient Active Problem List   Diagnosis    Acute pain    Hypothyroid    Hypertension    Anxiety    Paroxysmal SVT (supraventricular tachycardia) (HCC)    Trouble breathing    TIA (transient ischemic attack)    Leukocytosis    Bilateral carotid artery stenosis    Abnormal LFTs (liver function tests)    Abnormal liver enzymes    Elevated cholesterol    Gastroesophageal reflux disease without esophagitis       Past Surgical History:   Procedure Laterality Date    BREAST BIOPSY Left 1974    COLONOSCOPY  04/2018    HYSTERECTOMY         History reviewed  No pertinent family history      Social History     Socioeconomic History    Marital status: /Civil Union     Spouse name: Not on file    Number of children: Not on file    Years of education: Not on file    Highest education level: Not on file   Occupational History    Not on file   Social Needs    Financial resource strain: Not on file    Food insecurity     Worry: Not on file     Inability: Not on file   [employer] needs     Medical: Not on file     Non-medical: Not on file   Tobacco Use    Smoking status: Former Smoker    Smokeless tobacco: Never Used   Substance and Sexual Activity    Alcohol use: Not Currently     Frequency: Never    Drug use: No    Sexual activity: Not on file   Lifestyle    Physical activity     Days per week: Not on file     Minutes per session: Not on file    Stress: Not on file   Relationships    Social connections     Talks on phone: Not on file     Gets together: Not on file     Attends Mosque service: Not on file     Active member of club or organization: Not on file     Attends meetings of clubs or organizations: Not on file     Relationship status: Not on file    Intimate partner violence     Fear of current or ex partner: Not on file     Emotionally abused: Not on file     Physically abused: Not on file     Forced sexual activity: Not on file   Other Topics Concern    Not on file   Social History Narrative    Not on file       Allergies   Allergen Reactions    Iodinated Diagnostic Agents     Dye Fdc Red [Red Dye - Food Allergy] Palpitations         Current Outpatient Medications:     amLODIPine (NORVASC) 2 5 mg tablet, Take 2 5 mg by mouth daily, Disp: , Rfl:     aspirin (ECOTRIN LOW STRENGTH) 81 mg EC tablet, Take 1 tablet (81 mg total) by mouth daily, Disp: 30 tablet, Rfl: 0    atorvastatin (LIPITOR) 10 mg tablet, Take 2 tablets (20 mg total) by mouth daily, Disp: 30 tablet, Rfl: 0    levothyroxine 50 mcg tablet, Take 1 tablet (50 mcg total) by mouth daily, Disp: 30 tablet, Rfl: 0    LORazepam (ATIVAN) 0 5 mg tablet, Take 0 5 mg by mouth 2 (two) times a day as needed for anxiety  , Disp: , Rfl:     meclizine (ANTIVERT) 25 mg tablet, Take 25 mg by mouth 3 (three) times a day as needed for dizziness, Disp: , Rfl:     metoprolol succinate (TOPROL-XL) 25 mg 24 hr tablet, Take 1 tablet by mouth twice daily, Disp: , Rfl:     omeprazole (PriLOSEC) 20 mg delayed release capsule, Take 20 mg by mouth daily  , Disp: , Rfl:     rosuvastatin (CRESTOR) 40 MG tablet, Take 40 mg by mouth every evening, Disp: , Rfl:

## 2021-06-04 NOTE — PATIENT INSTRUCTIONS
Bilateral carotid artery stenosis    Your carotid duplex study is stable  The carotid arteries are overall patent/ open  There is moderate narrowing of the right carotid and only mild plaque on the left         - recommend carotid duplex surveillance every 6 months and periodic office visit  - due for carotid duplex  - aspirin 81 and rosuvastatin 40  - elevated LFTs for which atorvastatin was discontinued in favor of rosuvastatin by PCP  - continue with LFT monitoring per Dr Fan Varghese  - follow-up office visit in 1 year or sooner if needed

## 2021-07-08 ENCOUNTER — OFFICE VISIT (OUTPATIENT)
Dept: AUDIOLOGY | Facility: CLINIC | Age: 86
End: 2021-07-08
Payer: MEDICARE

## 2021-07-08 DIAGNOSIS — H90.3 SENSORINEURAL HEARING LOSS, BILATERAL: Primary | ICD-10-CM

## 2021-07-08 PROCEDURE — 92567 TYMPANOMETRY: CPT | Performed by: AUDIOLOGIST

## 2021-07-08 PROCEDURE — 92557 COMPREHENSIVE HEARING TEST: CPT | Performed by: AUDIOLOGIST

## 2021-07-09 NOTE — PROGRESS NOTES
HEARING EVALUATION/HEARING AID VISIT    Name:  Santi Richardson  :  1934  Age:  80 y o  Date of Evaluation:  2021    History:  Known hearing loss bilateral and (limited) use of TRUNG LYNN's fit in 2018  Reason for visit: Santi Richardson is being seen today at the request of Dr Cordell Davila for an evaluation of hearing  Patient was accompanied today by her daughter Jude Dowling  There are familial concerns regarding her lack of use of the hearing aids  Patient reports she will wear when there are a group of people present  Otoscopic Evaluation:   Right Ear: Clear and healthy ear canal and tympanic membrane   Left Ear: Clear and healthy ear canal and tympanic membrane    Tympanometry:   Right: Type As - reduced compliance   Left: Type As - reduced compliance    Audiogram Results: Moderate sloping to severe SNHL bilaterally  Results are consistent with those obtained in 3/2018  *see attached audiogram    Santi Richardson is fit with Oticon OPN 3 minirite hearing aid(s)  [facility] w/Z power)  Right serial number [id number]  Left serial number [id number]  Warranty date: OOW 21 (Loss/Damage and repair)  Patient reports one of the batteries is no longer keeping a charge  This most recent pair of batteries was purchased in 2019  Action:  1  Discussed O[employer]on Z power exchange offer to them as the aids are just under warranty (called into  to discuss)  They have chosen to proceed with the exchange  Will be ordering More 1 TRUNG R devices  RECOMMENDATIONS:  1  Scheduled for 21 w/ Giovanna Herr for HA fitting  We already have the Z power  and batteries  Will need to obtain the hearing aids as well     Keeping the custom earmolds (will need to order # 1 L/R 60 receivers)    Anais Boone , [facility], [place]# [id number], Hearing Aid Dispenser, [place]# [id number]  Clinical Audiologist

## 2021-07-19 ENCOUNTER — OFFICE VISIT (OUTPATIENT)
Dept: AUDIOLOGY | Facility: CLINIC | Age: 86
End: 2021-07-19

## 2021-07-19 DIAGNOSIS — H90.3 SENSORY HEARING LOSS, BILATERAL: Primary | ICD-10-CM

## 2021-07-19 NOTE — PROGRESS NOTES
Progress Note    Name:  Nani Sanches  :  1934  Age:  80 y o    Date of Evaluation: 21     Hearing aids arrived:    Brand:  Oticon  Model:  MORE 1 Camilla GILBERT  Color: Christianne Knock  Battery: 312  Right Serial Number:  [id number]  Left Serial Number:   [id number]  Right ; [id number], size 1  Left :[id number], size 1    :  Camilla GILBERT SN# [id number]        Liliana Sena, AuD , [address] # [id number]

## 2021-07-21 ENCOUNTER — OFFICE VISIT (OUTPATIENT)
Dept: AUDIOLOGY | Facility: CLINIC | Age: 86
End: 2021-07-21
Payer: COMMERCIAL

## 2021-07-21 DIAGNOSIS — H90.3 SENSORY HEARING LOSS, BILATERAL: Primary | ICD-10-CM

## 2021-07-21 PROCEDURE — V5261 HEARING AID, DIGIT, BIN, BTE: HCPCS | Performed by: AUDIOLOGIST

## 2021-07-21 NOTE — PROGRESS NOTES
Hearing Aid Fitting    Name:  Zakiya Awad  :  1934  Age:  80 y o  Date of Evaluation: 21     Zakiya Awad is being see today to be fit with new hearing aids  (Oticon Z power exchange)      Patient is fit with Oticon More 1 minirite R hearing aid(s)  Right serial number [id number]  Left serial number [id number]  Warranty date: 2024 (Loss/Damage and repair)   :  [id number]     Ear mold Battery  Dome   Right Half skeleton RC # 1 60 N/A   Left Half skeleton RC # 1 60 N/A     The hearing aid(s) were adjusted based on the patient's most recent audiogram and patient comfort  Patient noted good sound quality, and was happy with the fitting  Set to step 1 (2 was "too loud" to start) - step 2 in 1 month  Discussed with patient  VC set together, went over toggle switch  May need updated  to 85, but would need new earmolds  Will check her outcomes and wear time on next visit         Recommendation:   Follow up PRN or scheduled for 21 at 9:00 for HA visit   (patient paid in full)     Anais Jade , CCC-A, [place]# [id number], Hearing Aid Dispenser, [place]# [id number]  Clinical Audiologist

## 2021-07-28 ENCOUNTER — HOSPITAL ENCOUNTER (OUTPATIENT)
Dept: RADIOLOGY | Facility: HOSPITAL | Age: 86
Discharge: HOME | End: 2021-07-28
Payer: MEDICARE

## 2021-07-28 DIAGNOSIS — I65.23 BILATERAL CAROTID ARTERY STENOSIS: ICD-10-CM

## 2021-07-28 PROCEDURE — 93880 EXTRACRANIAL BILAT STUDY: CPT | Performed by: SURGERY

## 2021-07-28 PROCEDURE — 93880 EXTRACRANIAL BILAT STUDY: CPT

## 2021-08-14 ENCOUNTER — HOSPITAL ENCOUNTER (EMERGENCY)
Facility: HOSPITAL | Age: 86
Discharge: HOME | End: 2021-08-14
Attending: EMERGENCY MEDICINE | Admitting: EMERGENCY MEDICINE
Payer: MEDICARE

## 2021-08-14 ENCOUNTER — APPOINTMENT (EMERGENCY)
Dept: RADIOLOGY | Facility: HOSPITAL | Age: 86
End: 2021-08-14
Payer: MEDICARE

## 2021-08-14 ENCOUNTER — OFFICE VISIT (OUTPATIENT)
Dept: URGENT CARE | Facility: CLINIC | Age: 86
End: 2021-08-14
Payer: MEDICARE

## 2021-08-14 VITALS
OXYGEN SATURATION: 97 % | WEIGHT: 127 LBS | BODY MASS INDEX: 23.23 KG/M2 | DIASTOLIC BLOOD PRESSURE: 90 MMHG | RESPIRATION RATE: 16 BRPM | HEART RATE: 73 BPM | SYSTOLIC BLOOD PRESSURE: 195 MMHG | TEMPERATURE: 98.9 F

## 2021-08-14 VITALS
HEIGHT: 62 IN | TEMPERATURE: 99.4 F | OXYGEN SATURATION: 97 % | WEIGHT: 128 LBS | DIASTOLIC BLOOD PRESSURE: 85 MMHG | SYSTOLIC BLOOD PRESSURE: 170 MMHG | BODY MASS INDEX: 23.55 KG/M2 | HEART RATE: 79 BPM | RESPIRATION RATE: 16 BRPM

## 2021-08-14 DIAGNOSIS — S86.312A STRAIN OF PERONEAL TENDON OF LEFT FOOT, INITIAL ENCOUNTER: Primary | ICD-10-CM

## 2021-08-14 DIAGNOSIS — M79.673 FOOT PAIN: Primary | ICD-10-CM

## 2021-08-14 PROCEDURE — 73610 X-RAY EXAM OF ANKLE: CPT

## 2021-08-14 PROCEDURE — 99282 EMERGENCY DEPT VISIT SF MDM: CPT | Performed by: PHYSICIAN ASSISTANT

## 2021-08-14 PROCEDURE — 99283 EMERGENCY DEPT VISIT LOW MDM: CPT

## 2021-08-14 PROCEDURE — 73630 X-RAY EXAM OF FOOT: CPT

## 2021-08-14 PROCEDURE — 99213 OFFICE O/P EST LOW 20 MIN: CPT | Performed by: PHYSICIAN ASSISTANT

## 2021-08-14 NOTE — PROGRESS NOTES
[facility]      NAME: Carson Valadez is a 80 y o  female  : 1934    MRN: [id number]  DATE: 2021  TIME: 10:33 AM    Assessment and Plan   Strain of peroneal tendon of left foot, initial encounter [S86 312A]  1  Strain of peroneal tendon of left foot, initial encounter       Patient Instructions   Tendonitis left foot  Xray not available, no tech  RICE- rest, ice (20 min on, 20 min off), compression with ace bandage, and elevation while at home  Tylenol as needed for pain    Follow up with PCP in 3-5 days  Proceed to  ER if symptoms worsen  Chief Complaint   No chief complaint on file  History of Present Illness       Yohan Monet is an 70-year-old female who presents to clinic complaining of left foot pain x1 day  She states that since yesterday evening she has been having a dull left foot pain  She states the pain is constant and denies any radiation  She denies any history of trauma, injury, or overuse  She denies any swelling, bruising, or paresthesias  She denies any history of injury to the foot prior  She notes mild pain at rest any increased pain with flexion and extension of the foot  Review of Systems   Review of Systems   Constitutional: Negative for chills and fever  Musculoskeletal: Positive for arthralgias  Negative for gait problem and joint swelling  Skin: Negative for color change and wound           Current Medications       Current Outpatient Medications:     amLODIPine (NORVASC) 2 5 mg tablet, Take 2 5 mg by mouth daily, Disp: , Rfl:     aspirin (ECOTRIN LOW STRENGTH) 81 mg EC tablet, Take 1 tablet (81 mg total) by mouth daily, Disp: 30 tablet, Rfl: 0    atorvastatin (LIPITOR) 10 mg tablet, Take 2 tablets (20 mg total) by mouth daily, Disp: 30 tablet, Rfl: 0    levothyroxine 50 mcg tablet, Take 1 tablet (50 mcg total) by mouth daily, Disp: 30 tablet, Rfl: 0    LORazepam (ATIVAN) 0 5 mg tablet, Take 0 5 mg by mouth 2 (two) times a day as needed for anxiety  , Disp: , Rfl:     meclizine (ANTIVERT) 25 mg tablet, Take 25 mg by mouth 3 (three) times a day as needed for dizziness, Disp: , Rfl:     metoprolol succinate (TOPROL-XL) 25 mg 24 hr tablet, Take 1 tablet by mouth twice daily, Disp: , Rfl:     omeprazole (PriLOSEC) 20 mg delayed release capsule, Take 20 mg by mouth daily  , Disp: , Rfl:     rosuvastatin (CRESTOR) 40 MG tablet, Take 40 mg by mouth every evening, Disp: , Rfl:     Current Allergies     Allergies as of 08/14/2021 - Reviewed 08/14/2021   Allergen Reaction Noted    Iodinated diagnostic agents  10/04/2020    Dye fdc red [red dye - food allergy] Palpitations 09/22/2016            The following portions of the patient's history were reviewed and updated as appropriate: allergies, current medications, past family history, past medical history, past social history, past surgical history and problem list      Past Medical History:   Diagnosis Date    Anxiety     GERD (gastroesophageal reflux disease)     Hypertension     Hypothyroid     Vertigo        Past Surgical History:   Procedure Laterality Date    BREAST BIOPSY Left 1974    COLONOSCOPY  04/2018    HYSTERECTOMY         History reviewed  No pertinent family history  Medications have been verified  Objective   /85   Pulse 79   Temp 99 4 °F (37 4 °C)   Resp 16   Ht 5' 2" (1 575 m)   Wt 58 1 kg (128 lb)   SpO2 97%   BMI 23 41 kg/m²   No LMP recorded  Patient has had a hysterectomy  Physical Exam     Physical Exam  Vitals and nursing note reviewed  Constitutional:       General: She is not in acute distress  Appearance: Normal appearance  She is not ill-appearing  Cardiovascular:      Rate and Rhythm: Normal rate and regular rhythm  Heart sounds: Normal heart sounds  Pulmonary:      Effort: Pulmonary effort is normal       Breath sounds: Normal breath sounds  Musculoskeletal:      Left foot: Decreased range of motion   Normal capillary refill  Tenderness (medial aspect of foot) present  No swelling, laceration or bony tenderness  Neurological:      Mental Status: She is alert and oriented to person, place, and time     Psychiatric:         Mood and Affect: Mood normal          Behavior: Behavior normal

## 2021-08-14 NOTE — ED PROVIDER NOTES
History  Chief Complaint   Patient presents with    Ankle Pain     left ankle pain w/o injury  requesting a xray     20-year-old female history of hypertension presents with left foot pain x1 day  She states earlier today the medial aspect of her left foot was starting to ache at rest   States that ambulation and heat makes the pain better  She has been taking Tylenol with some relief  She was at urgent care earlier today however they did not have x-ray available so she presents here for x-ray imaging  She denies any trauma or falls  No history of gout  No redness or wounds  No swelling or bruising  No numbness or tingling  No calf pain or leg swelling  No knee or hip pain  No fever or chills  No other injuries or complaints  Prior to Admission Medications   Prescriptions Last Dose Informant Patient Reported? Taking? LORazepam (ATIVAN) 0 5 mg tablet  Self Yes No   Sig: Take 0 5 mg by mouth 2 (two) times a day as needed for anxiety     amLODIPine (NORVASC) 2 5 mg tablet  Self Yes No   Sig: Take 2 5 mg by mouth daily   aspirin (ECOTRIN LOW STRENGTH) 81 mg EC tablet  Self No No   Sig: Take 1 tablet (81 mg total) by mouth daily   atorvastatin (LIPITOR) 10 mg tablet  Self No No   Sig: Take 2 tablets (20 mg total) by mouth daily   levothyroxine 50 mcg tablet  Self No No   Sig: Take 1 tablet (50 mcg total) by mouth daily   meclizine (ANTIVERT) 25 mg tablet  Self Yes No   Sig: Take 25 mg by mouth 3 (three) times a day as needed for dizziness   metoprolol succinate (TOPROL-XL) 25 mg 24 hr tablet  Self Yes No   Sig: Take 1 tablet by mouth twice daily   omeprazole (PriLOSEC) 20 mg delayed release capsule  Self Yes No   Sig: Take 20 mg by mouth daily     rosuvastatin (CRESTOR) 40 MG tablet  Self Yes No   Sig: Take 40 mg by mouth every evening      Facility-Administered Medications: None       Past Medical History:   Diagnosis Date    Anxiety     GERD (gastroesophageal reflux disease)     Hypertension     Hypothyroid     Vertigo        Past Surgical History:   Procedure Laterality Date    BREAST BIOPSY Left 1974    COLONOSCOPY  04/2018    HYSTERECTOMY         History reviewed  No pertinent family history  I have reviewed and agree with the history as documented  E-Cigarette/Vaping    E-Cigarette Use Never User      E-Cigarette/Vaping Substances    Nicotine No     THC No     CBD No     Flavoring No     Other No     Unknown No      Social History     Tobacco Use    Smoking status: Former Smoker    Smokeless tobacco: Never Used   Vaping Use    Vaping Use: Never used   Substance Use Topics    Alcohol use: Not Currently    Drug use: No       Review of Systems   Constitutional: Negative for chills and fever  HENT: Negative for sneezing and sore throat  Respiratory: Negative for cough and shortness of breath  Cardiovascular: Negative for chest pain, palpitations and leg swelling  Gastrointestinal: Negative for abdominal pain, constipation, diarrhea, nausea and vomiting  Musculoskeletal: Positive for arthralgias and myalgias  Negative for back pain, gait problem and joint swelling  Skin: Negative for color change, pallor, rash and wound  Neurological: Negative for dizziness, syncope, weakness, light-headedness, numbness and headaches  All other systems reviewed and are negative  Physical Exam  Physical Exam  Vitals and nursing note reviewed  Constitutional:       General: She is not in acute distress  Appearance: Normal appearance  She is well-developed and normal weight  She is not diaphoretic  HENT:      Head: Normocephalic and atraumatic  Nose: Nose normal    Eyes:      Extraocular Movements: Extraocular movements intact  Conjunctiva/sclera: Conjunctivae normal    Cardiovascular:      Rate and Rhythm: Normal rate and regular rhythm  Pulses: Normal pulses  Heart sounds: Normal heart sounds  No murmur heard  No friction rub  No gallop      Pulmonary: Effort: Pulmonary effort is normal  No respiratory distress  Breath sounds: Normal breath sounds  No stridor  No wheezing or rales  Comments: Sp02 is 97% indicating adequate oxygenation on room air  Musculoskeletal:         General: Normal range of motion  Cervical back: Normal range of motion  Feet:    Skin:     General: Skin is warm  Capillary Refill: Capillary refill takes less than 2 seconds  Coloration: Skin is not pale  Findings: No erythema or rash  Neurological:      Mental Status: She is alert  Mental status is at baseline  Vital Signs  ED Triage Vitals [08/14/21 1706]   Temperature Pulse Respirations Blood Pressure SpO2   98 9 °F (37 2 °C) 73 16 (!) 195/90 97 %      Temp src Heart Rate Source Patient Position - Orthostatic VS BP Location FiO2 (%)   -- -- -- -- --      Pain Score       9           Vitals:    08/14/21 1706   BP: (!) 195/90   Pulse: 73         Visual Acuity      ED Medications  Medications - No data to display    Diagnostic Studies  Results Reviewed     None                 XR ankle 3+ views LEFT    (Results Pending)   XR foot 3+ views LEFT    (Results Pending)              Procedures  Procedures         ED Course                                           MDM  Number of Diagnoses or Management Options  Foot pain  Diagnosis management comments: Advised rest, heat, elevation, tylenol as needed for pain  Given surgical shoe  Given orthopedic follow up  Return for any worsening including but not limited to fever, chills, redness, swelling, calf tenderness, numbness, etc  Gave patient and family proper education regarding diagnosis  Answered all questions  Return to ED for any worsening of symptoms otherwise follow up with primary care physician for re-evaluation  Discussed plan with patient and family who verbalized understanding and agreed to plan         Amount and/or Complexity of Data Reviewed  Tests in the radiology section of CPT®: ordered and reviewed  Discussion of test results with the performing providers: yes  Review and summarize past medical records: yes  Discuss the patient with other providers: yes  Independent visualization of images, tracings, or specimens: yes        Disposition  Final diagnoses: Foot pain     Time reflects when diagnosis was documented in both MDM as applicable and the Disposition within this note     Time User Action Codes Description Comment    8/14/2021  5:47 PM Johanna File Add [O06 158] Foot pain       ED Disposition     ED Disposition Condition Date/Time Comment    Discharge Stable Sat Aug 14, 2021  5:47  [address] discharge to home/self care  Follow-up Information     Follow up With Specialties Details Why Contact Info Additional Information    Isabell Rojo MD Orthopedic Surgery Schedule an appointment as soon as possible for a visit in 3 days As needed [address] Emergency Department Emergency Medicine Go to  As needed, If symptoms worsen including but not limited to -  fever, chills, cold extremity, redness, swelling, numbness, calf pain, etc [address] [facility] [id number] [address] [address] Emergency Department, [place], [place], [address]          Patient's Medications   Discharge Prescriptions    No medications on file     No discharge procedures on file      PDMP Review     None          ED Provider  Electronically Signed by           Yannick Lake PA-C  08/14/21 1720

## 2021-11-04 ENCOUNTER — TELEPHONE (OUTPATIENT)
Dept: GASTROENTEROLOGY | Facility: CLINIC | Age: 86
End: 2021-11-04

## 2021-12-13 ENCOUNTER — APPOINTMENT (OUTPATIENT)
Dept: LAB | Facility: CLINIC | Age: 86
End: 2021-12-13
Payer: MEDICARE

## 2021-12-13 DIAGNOSIS — R74.8 ABNORMAL LIVER ENZYMES: ICD-10-CM

## 2021-12-13 LAB
ALBUMIN SERPL BCP-MCNC: 3.6 G/DL (ref 3.5–5)
ALP SERPL-CCNC: 94 U/L (ref 46–116)
ALT SERPL W P-5'-P-CCNC: 20 U/L (ref 12–78)
AST SERPL W P-5'-P-CCNC: 24 U/L (ref 5–45)
BILIRUB DIRECT SERPL-MCNC: 0.09 MG/DL (ref 0–0.2)
BILIRUB SERPL-MCNC: 0.44 MG/DL (ref 0.2–1)
PROT SERPL-MCNC: 7.6 G/DL (ref 6.4–8.2)

## 2021-12-13 PROCEDURE — 36415 COLL VENOUS BLD VENIPUNCTURE: CPT

## 2021-12-13 PROCEDURE — 80076 HEPATIC FUNCTION PANEL: CPT

## 2021-12-14 ENCOUNTER — OFFICE VISIT (OUTPATIENT)
Dept: GASTROENTEROLOGY | Facility: CLINIC | Age: 86
End: 2021-12-14
Payer: MEDICARE

## 2021-12-14 VITALS
BODY MASS INDEX: 24.07 KG/M2 | DIASTOLIC BLOOD PRESSURE: 89 MMHG | WEIGHT: 127.5 LBS | SYSTOLIC BLOOD PRESSURE: 143 MMHG | HEART RATE: 67 BPM | HEIGHT: 61 IN

## 2021-12-14 DIAGNOSIS — K76.89 LIVER CYST: ICD-10-CM

## 2021-12-14 DIAGNOSIS — K21.9 GASTROESOPHAGEAL REFLUX DISEASE WITHOUT ESOPHAGITIS: ICD-10-CM

## 2021-12-14 DIAGNOSIS — R79.89 ABNORMAL LFTS (LIVER FUNCTION TESTS): ICD-10-CM

## 2021-12-14 DIAGNOSIS — R74.8 ABNORMAL LIVER ENZYMES: Primary | ICD-10-CM

## 2021-12-14 PROCEDURE — 99214 OFFICE O/P EST MOD 30 MIN: CPT | Performed by: INTERNAL MEDICINE

## 2022-02-04 ENCOUNTER — IMMUNIZATIONS (OUTPATIENT)
Dept: FAMILY MEDICINE CLINIC | Facility: HOSPITAL | Age: 87
End: 2022-02-04

## 2022-02-04 DIAGNOSIS — Z23 ENCOUNTER FOR IMMUNIZATION: Primary | ICD-10-CM

## 2022-02-04 PROCEDURE — 0064A COVID-19 MODERNA VACC 0.25 ML BOOSTER: CPT

## 2022-02-04 PROCEDURE — 91306 COVID-19 MODERNA VACC 0.25 ML BOOSTER: CPT

## 2022-03-17 ENCOUNTER — APPOINTMENT (OUTPATIENT)
Dept: LAB | Facility: CLINIC | Age: 87
End: 2022-03-17
Payer: MEDICARE

## 2022-03-17 PROCEDURE — 84165 PROTEIN E-PHORESIS SERUM: CPT | Performed by: PATHOLOGY

## 2022-04-15 NOTE — PLAN OF CARE
Problem: DISCHARGE PLANNING - CARE MANAGEMENT  Goal: Discharge to post-acute care or home with appropriate resources  INTERVENTIONS:  - Conduct assessment to determine patient/family and health care team treatment goals, and need for post-acute services based on payer coverage, community resources, and patient preferences, and barriers to discharge  - Address psychosocial, clinical, and financial barriers to discharge as identified in assessment in conjunction with the patient/family and health care team  - Arrange appropriate level of post-acute services according to patient's   needs and preference and payer coverage in collaboration with the physician and health care team  - Communicate with and update the patient/family, physician, and health care team regarding progress on the discharge plan  - Arrange appropriate transportation to post-acute venues  Outcome: Adequate for Discharge  Plan:  Discharge to home with no needs  Tonsillitis

## 2022-05-02 ENCOUNTER — APPOINTMENT (OUTPATIENT)
Dept: LAB | Facility: HOSPITAL | Age: 87
End: 2022-05-02
Payer: MEDICARE

## 2022-05-02 DIAGNOSIS — D47.3 ESSENTIAL THROMBOCYTHEMIA (HCC): ICD-10-CM

## 2022-05-02 LAB
BASOPHILS # BLD AUTO: 0.09 THOUSANDS/ΜL (ref 0–0.1)
BASOPHILS NFR BLD AUTO: 1 % (ref 0–1)
EOSINOPHIL # BLD AUTO: 0.49 THOUSAND/ΜL (ref 0–0.61)
EOSINOPHIL NFR BLD AUTO: 5 % (ref 0–6)
ERYTHROCYTE [DISTWIDTH] IN BLOOD BY AUTOMATED COUNT: 17.4 % (ref 11.6–15.1)
HCT VFR BLD AUTO: 41.6 % (ref 34.8–46.1)
HGB BLD-MCNC: 12.9 G/DL (ref 11.5–15.4)
IMM GRANULOCYTES # BLD AUTO: 0.04 THOUSAND/UL (ref 0–0.2)
IMM GRANULOCYTES NFR BLD AUTO: 0 % (ref 0–2)
LYMPHOCYTES # BLD AUTO: 2.25 THOUSANDS/ΜL (ref 0.6–4.47)
LYMPHOCYTES NFR BLD AUTO: 22 % (ref 14–44)
MCH RBC QN AUTO: 30.7 PG (ref 26.8–34.3)
MCHC RBC AUTO-ENTMCNC: 31 G/DL (ref 31.4–37.4)
MCV RBC AUTO: 99 FL (ref 82–98)
MONOCYTES # BLD AUTO: 0.67 THOUSAND/ΜL (ref 0.17–1.22)
MONOCYTES NFR BLD AUTO: 7 % (ref 4–12)
NEUTROPHILS # BLD AUTO: 6.83 THOUSANDS/ΜL (ref 1.85–7.62)
NEUTS SEG NFR BLD AUTO: 65 % (ref 43–75)
NRBC BLD AUTO-RTO: 0 /100 WBCS
PLATELET # BLD AUTO: 620 THOUSANDS/UL (ref 149–390)
PMV BLD AUTO: 11.4 FL (ref 8.9–12.7)
RBC # BLD AUTO: 4.2 MILLION/UL (ref 3.81–5.12)
WBC # BLD AUTO: 10.37 THOUSAND/UL (ref 4.31–10.16)

## 2022-05-02 PROCEDURE — 85025 COMPLETE CBC W/AUTO DIFF WBC: CPT

## 2022-05-02 PROCEDURE — 36415 COLL VENOUS BLD VENIPUNCTURE: CPT

## 2022-05-10 ENCOUNTER — APPOINTMENT (OUTPATIENT)
Dept: LAB | Facility: CLINIC | Age: 87
End: 2022-05-10
Payer: MEDICARE

## 2022-05-10 ENCOUNTER — TRANSCRIBE ORDERS (OUTPATIENT)
Dept: LAB | Facility: CLINIC | Age: 87
End: 2022-05-10

## 2022-05-10 DIAGNOSIS — D47.3 ET (ESSENTIAL THROMBOCYTHEMIA) (HCC): Primary | ICD-10-CM

## 2022-05-10 LAB
BASOPHILS # BLD AUTO: 0.08 THOUSANDS/ΜL (ref 0–0.1)
BASOPHILS NFR BLD AUTO: 1 % (ref 0–1)
EOSINOPHIL # BLD AUTO: 0.42 THOUSAND/ΜL (ref 0–0.61)
EOSINOPHIL NFR BLD AUTO: 3 % (ref 0–6)
ERYTHROCYTE [DISTWIDTH] IN BLOOD BY AUTOMATED COUNT: 17.4 % (ref 11.6–15.1)
HCT VFR BLD AUTO: 40.4 % (ref 34.8–46.1)
HGB BLD-MCNC: 12.6 G/DL (ref 11.5–15.4)
IMM GRANULOCYTES # BLD AUTO: 0.07 THOUSAND/UL (ref 0–0.2)
IMM GRANULOCYTES NFR BLD AUTO: 1 % (ref 0–2)
LYMPHOCYTES # BLD AUTO: 2.26 THOUSANDS/ΜL (ref 0.6–4.47)
LYMPHOCYTES NFR BLD AUTO: 18 % (ref 14–44)
MCH RBC QN AUTO: 31 PG (ref 26.8–34.3)
MCHC RBC AUTO-ENTMCNC: 31.2 G/DL (ref 31.4–37.4)
MCV RBC AUTO: 99 FL (ref 82–98)
MONOCYTES # BLD AUTO: 0.82 THOUSAND/ΜL (ref 0.17–1.22)
MONOCYTES NFR BLD AUTO: 7 % (ref 4–12)
NEUTROPHILS # BLD AUTO: 9.03 THOUSANDS/ΜL (ref 1.85–7.62)
NEUTS SEG NFR BLD AUTO: 70 % (ref 43–75)
NRBC BLD AUTO-RTO: 0 /100 WBCS
PLATELET # BLD AUTO: 593 THOUSANDS/UL (ref 149–390)
PMV BLD AUTO: 11.7 FL (ref 8.9–12.7)
RBC # BLD AUTO: 4.07 MILLION/UL (ref 3.81–5.12)
WBC # BLD AUTO: 12.68 THOUSAND/UL (ref 4.31–10.16)

## 2022-05-10 PROCEDURE — 85025 COMPLETE CBC W/AUTO DIFF WBC: CPT

## 2022-05-10 PROCEDURE — 36415 COLL VENOUS BLD VENIPUNCTURE: CPT

## 2022-10-17 ENCOUNTER — TELEPHONE (OUTPATIENT)
Dept: VASCULAR SURGERY | Facility: CLINIC | Age: 87
End: 2022-10-17

## 2022-10-17 DIAGNOSIS — I65.23 BILATERAL CAROTID ARTERY STENOSIS: Primary | ICD-10-CM

## 2022-10-17 NOTE — TELEPHONE ENCOUNTER
Attempted to contact patient to schedule appointment(s) listed below  Requested patient call [phone] option 3 to schedule appointment(s)  Patient's appointment(s) are past due, expected ASAP      Dopplers  [] Abdominal Aorta Iliac (AOIL)  [x] Carotid (CV)   [] Celiac and/or Mesenteric  [] Endovascular Aortic Repair (EVAR)   [] Hemodialysis Access (HD)   [] Lower Limb Arterial (KYLE)  [] Lower Limb Venous Duplex with Reflux (LEVDR)  [] Renal Artery  [] Upper Limb Arterial (UEA)    [] Upper Limb Venous (UEV)              [] JIMENA and Waveform analysis     Advanced Imaging   [] CTA head/neck    [] CTA abdomen    [] CTA abdomen & pelvis    [] CT abdomen with/ without contrast  [] CT abdomen with contrast  [] CT abdomen without contrast    [] CT abdomen & pelvis with/ without contrast  [] CT abdomen & pelvis with contrast  [] CT abdomen & pelvis without contrast    Office Visit   [] New patient, patient last seen over 3 years ago  [] Risk factor modification (RFM)   [x] Follow up   [] Lost to follow up (LTFU)

## 2022-11-21 ENCOUNTER — HOSPITAL ENCOUNTER (OUTPATIENT)
Dept: RADIOLOGY | Facility: HOSPITAL | Age: 87
Discharge: HOME | End: 2022-11-21

## 2022-11-21 DIAGNOSIS — I65.23 BILATERAL CAROTID ARTERY STENOSIS: ICD-10-CM

## 2023-01-20 ENCOUNTER — OFFICE VISIT (OUTPATIENT)
Dept: VASCULAR SURGERY | Facility: CLINIC | Age: 88
End: 2023-01-20

## 2023-01-20 VITALS
WEIGHT: 130.9 LBS | DIASTOLIC BLOOD PRESSURE: 76 MMHG | HEIGHT: 60 IN | HEART RATE: 75 BPM | SYSTOLIC BLOOD PRESSURE: 126 MMHG | BODY MASS INDEX: 25.7 KG/M2

## 2023-01-20 DIAGNOSIS — G45.9 TIA (TRANSIENT ISCHEMIC ATTACK): ICD-10-CM

## 2023-01-20 DIAGNOSIS — I10 PRIMARY HYPERTENSION: ICD-10-CM

## 2023-01-20 DIAGNOSIS — I65.23 BILATERAL CAROTID ARTERY STENOSIS: Primary | ICD-10-CM

## 2023-01-20 NOTE — PROGRESS NOTES
Assessment/Plan:    Bilateral carotid artery stenosis  -     VAS carotid complete study; Future    -Asymptomatic; no symptoms of TIA/stroke    -CV duplex 11/21/22: R 50-69% (144/32); L carotid (105/33)  -CV duplex 11/4/2020: R  50-69% 134/30 3/1 51;  L  < 50% 83/28     -CTA 10/4/2020:  B CAROL 65-70%; lumen 1 5 mm at maximal stenosis  -MRI brain 10/5/20: Mild chronic microvascular disease, age-related atrophy      Plan: Patient with asymptomatic, bilateral carotid artery stenosis which falls in the moderate range by velocity criteria  No indication for surgery at this time  Continue with optimal medical therapy on aspirin and statin with routine duplex surveillance and periodic office visit     -continue with aspirin 81 and rosuvastatin 40  -maintain good blood pressure and cholesterol control  -regular activity as tolerated  -patient education regarding carotid disease and stroke provided  -check carotid artery duplex in 6 months with OV    Additional dx:  Primary hypertension      Subjective:      Patient ID: Raysa Mccullough is a 80 y o  female  Pt is here to rev CV 11/21/22  Pt denies TIA stroke symptoms  Pt is taking ASA and Rosuvastatin  Pt is a former smoker  HPI    Raysa Mccullough 79 y/o F hypothyroidism, vertigo, TIA (Oct '20) with left temporal HA, dizziness, lightheadedness and left facial twitching  Sx lasted for 5 minutes  Neurology thought symptoms were related to hypertensive urgency  Work up [employer] which showed internal carotid artery stenosis 65-70% bilaterally  1/20/23: Patient reports that she was last seen 1 5 years ago, she was diagnosed thrombocytosis and is on hydroxyurea which she is "stable"  Otherwise, she has had no medical changes  She has no complaints and continues to complete activities of daily living comfortably  She has no symptoms of TIA/stroke  No amaurosis fugax, vision changes, dysarthria, unilateral numbness or weakness    We reviewed the symptoms of stroke for which she should call 911  We reviewed her recent labs and testing  We reviewed her current prior carotid duplex studies which remained stable     -hydroxyuria 500  -LDL 48        CV duplex 11/21/22  Risk Factors: The patient has history of HTN and previous smoking (quit >10yrs ago)  Clinical:  Right Pressure:  148/80 mm Hg, Left Pressure:  128/80 mm Hg  FINDINGS:     Right        Impression  PSV  EDV (cm/s)  Direction of Flow  Ratio    Dist  ICA                 68          21                      0 93    Mid  ICA                 118          36                      1 63    Prox  ICA    50 - 69%    144          32                      1 99    Dist CCA                  70          16                              Mid CCA                   72          17                      1 43    Prox CCA                  51          14                      1 03    Ext Carotid               87          11                      1 20    Prox Vert                 43          10  Antegrade                   Subclavian               120           0                              Innominate                49           0                                 Left         Impression  PSV  EDV (cm/s)  Direction of Flow  Ratio    Dist  ICA                123          30                      2 06    Mid  ICA                  76          18                      1 27    Prox   ICA    1 - 49%     105          33                      1 75    Dist CCA                  61          15                              Mid CCA                   60          16                      1 12    Prox CCA                  53          13                              Ext Carotid               44           8                      0 73    Prox Vert                 54          15  Antegrade                   Subclavian                98           4                                       CONCLUSION:  Impression  RIGHT:  There is 50-69% stenosis noted in the internal carotid artery  Plaque is  heterogenous and irregular  Vertebral artery flow is antegrade  There is no significant subclavian artery  disease  LEFT:  There is <50% stenosis noted in the internal carotid artery  Plaque is  heterogenous and irregular  Vertebral artery flow is antegrade  There is no significant subclavian artery  disease  Compared to previous study on 7/28/2021, there is no significant change  Recommend repeat testing in 6month as per protocol unless otherwise indicated  The following portions of the patient's history were reviewed and updated as appropriate: allergies, current medications, past family history, past medical history, past social history, past surgical history and problem list     Review of Systems   Constitutional: Negative  HENT: Negative  Eyes: Negative  Respiratory: Negative  Cardiovascular: Negative  Gastrointestinal: Negative  Endocrine: Negative  Genitourinary: Negative  Musculoskeletal: Negative  Skin: Negative  Allergic/Immunologic: Negative  Neurological: Negative  Hematological: Negative  Psychiatric/Behavioral: Negative  Objective:      /76 (BP Location: Left arm, Patient Position: Sitting, Cuff Size: Standard)   Pulse 75   Ht 5' (1 524 m)   Wt 59 4 kg (130 lb 14 4 oz)   BMI 25 56 kg/m²   No carotid bruits appreciated  Non-focal     Physical Exam  Vitals and nursing note reviewed  Constitutional:       Appearance: She is well-developed  HENT:      Head: Normocephalic and atraumatic  Eyes:      Pupils: Pupils are equal, round, and reactive to light  Neck:      Thyroid: No thyromegaly  Vascular: No carotid bruit or JVD  Trachea: Trachea normal    Cardiovascular:      Rate and Rhythm: Normal rate and regular rhythm  Pulses:           Carotid pulses are 2+ on the right side and 2+ on the left side  Radial pulses are 2+ on the right side and 2+ on the left side        Heart sounds: Normal heart sounds, S1 normal and S2 normal  No murmur heard  No friction rub  No gallop  Pulmonary:      Effort: Pulmonary effort is normal  No accessory muscle usage or respiratory distress  Breath sounds: Normal breath sounds  No wheezing or rales  Abdominal:      General: Bowel sounds are normal  There is no distension  Palpations: Abdomen is soft  Tenderness: There is no abdominal tenderness  Musculoskeletal:         General: No deformity  Normal range of motion  Cervical back: Neck supple  Right lower leg: No edema  Left lower leg: No edema  Skin:     General: Skin is warm and dry  Findings: No lesion or rash  Nails: There is no clubbing  Neurological:      Mental Status: She is alert and oriented to person, place, and time  Comments: Grossly normal    Psychiatric:         Behavior: Behavior is cooperative  I have reviewed and made appropriate changes to the review of systems input by the medical assistant  Vitals:    01/20/23 0917 01/20/23 0923   BP: 130/72 126/76   BP Location: Right arm Left arm   Patient Position: Sitting Sitting   Cuff Size: Standard Standard   Pulse: 75    Weight: 59 4 kg (130 lb 14 4 oz)    Height: 5' (1 524 m)        Patient Active Problem List   Diagnosis   • Acute pain   • Hypothyroid   • Hypertension   • Anxiety   • Paroxysmal SVT (supraventricular tachycardia) (HCC)   • Trouble breathing   • TIA (transient ischemic attack)   • Leukocytosis   • Bilateral carotid artery stenosis   • Abnormal LFTs (liver function tests)   • Abnormal liver enzymes   • Elevated cholesterol   • Gastroesophageal reflux disease without esophagitis       Past Surgical History:   Procedure Laterality Date   • BREAST BIOPSY Left 1974   • COLONOSCOPY  04/2018   • HYSTERECTOMY         No family history on file      Social History     Socioeconomic History   • Marital status: /Civil Union     Spouse name: Not on file   • Number of children: Not on file   • Years of education: Not on file   • Highest education level: Not on file   Occupational History   • Not on file   Tobacco Use   • Smoking status: Former   • Smokeless tobacco: Never   Vaping Use   • Vaping Use: Never used   Substance and Sexual Activity   • Alcohol use: Not Currently   • Drug use: No   • Sexual activity: Not on file   Other Topics Concern   • Not on file   Social History Narrative   • Not on file     Social Determinants of Health     Financial Resource Strain: Not on file   Food Insecurity: Not on file   Transportation Needs: Not on file   Physical Activity: Not on file   Stress: Not on file   Social Connections: Not on file   Intimate Partner Violence: Not on file   Housing Stability: Not on file       Allergies   Allergen Reactions   • Iodinated Contrast Media    • Dye Fdc Red [Red Dye - Food Allergy] Palpitations         Current Outpatient Medications:   •  amLODIPine (NORVASC) 2 5 mg tablet, Take 2 5 mg by mouth daily, Disp: , Rfl:   •  aspirin (ECOTRIN LOW STRENGTH) 81 mg EC tablet, Take 1 tablet (81 mg total) by mouth daily, Disp: 30 tablet, Rfl: 0  •  levothyroxine 50 mcg tablet, Take 1 tablet (50 mcg total) by mouth daily, Disp: 30 tablet, Rfl: 0  •  LORazepam (ATIVAN) 0 5 mg tablet, Take 0 5 mg by mouth 2 (two) times a day as needed for anxiety  , Disp: , Rfl:   •  metoprolol succinate (TOPROL-XL) 25 mg 24 hr tablet, Take 1 tablet by mouth twice daily, Disp: , Rfl:   •  rosuvastatin (CRESTOR) 40 MG tablet, Take 40 mg by mouth every evening, Disp: , Rfl:   •  meclizine (ANTIVERT) 25 mg tablet, Take 25 mg by mouth 3 (three) times a day as needed for dizziness (Patient not taking: Reported on 1/20/2023), Disp: , Rfl:   •  omeprazole (PriLOSEC) 20 mg delayed release capsule, Take 20 mg by mouth daily   (Patient not taking: Reported on 1/20/2023), Disp: , Rfl:

## 2023-01-20 NOTE — PATIENT INSTRUCTIONS
Carotid artery disease  -continue with aspirin 81 and rosuvastatin 40  -patient education regarding carotid disease and stroke provided  -check carotid artery duplex in 6 months with OV

## 2023-04-04 ENCOUNTER — HOSPITAL ENCOUNTER (OUTPATIENT)
Dept: RADIOLOGY | Facility: HOSPITAL | Age: 88
Discharge: HOME | End: 2023-04-04
Attending: INTERNAL MEDICINE

## 2023-04-04 DIAGNOSIS — R13.10 DYSPHAGIA, UNSPECIFIED TYPE: ICD-10-CM

## 2023-06-29 ENCOUNTER — APPOINTMENT (EMERGENCY)
Dept: RADIOLOGY | Facility: HOSPITAL | Age: 88
End: 2023-06-29
Payer: MEDICARE

## 2023-06-29 ENCOUNTER — HOSPITAL ENCOUNTER (EMERGENCY)
Facility: HOSPITAL | Age: 88
Discharge: HOME | End: 2023-06-29
Attending: EMERGENCY MEDICINE
Payer: MEDICARE

## 2023-06-29 VITALS
TEMPERATURE: 98 F | DIASTOLIC BLOOD PRESSURE: 70 MMHG | SYSTOLIC BLOOD PRESSURE: 162 MMHG | HEART RATE: 61 BPM | WEIGHT: 130 LBS | BODY MASS INDEX: 25.39 KG/M2 | OXYGEN SATURATION: 99 % | RESPIRATION RATE: 20 BRPM

## 2023-06-29 DIAGNOSIS — E03.9 HYPOTHYROID: ICD-10-CM

## 2023-06-29 DIAGNOSIS — R55 VASOVAGAL NEAR SYNCOPE: Primary | ICD-10-CM

## 2023-06-29 LAB
2HR DELTA HS TROPONIN: 0 NG/L
ALBUMIN SERPL BCP-MCNC: 4.1 G/DL (ref 3.5–5)
ALP SERPL-CCNC: 63 U/L (ref 34–104)
ALT SERPL W P-5'-P-CCNC: 11 U/L (ref 7–52)
ANION GAP SERPL CALCULATED.3IONS-SCNC: 10 MMOL/L
APTT PPP: 31 SECONDS (ref 23–37)
AST SERPL W P-5'-P-CCNC: 18 U/L (ref 13–39)
ATRIAL RATE: 71 BPM
ATRIAL RATE: 85 BPM
BASOPHILS # BLD AUTO: 0.03 THOUSANDS/ÂΜL (ref 0–0.1)
BASOPHILS NFR BLD AUTO: 0 % (ref 0–1)
BILIRUB SERPL-MCNC: 0.43 MG/DL (ref 0.2–1)
BUN SERPL-MCNC: 30 MG/DL (ref 5–25)
CALCIUM SERPL-MCNC: 9.2 MG/DL (ref 8.4–10.2)
CARDIAC TROPONIN I PNL SERPL HS: 5 NG/L
CARDIAC TROPONIN I PNL SERPL HS: 5 NG/L
CHLORIDE SERPL-SCNC: 101 MMOL/L (ref 96–108)
CO2 SERPL-SCNC: 24 MMOL/L (ref 21–32)
CREAT SERPL-MCNC: 0.98 MG/DL (ref 0.6–1.3)
EOSINOPHIL # BLD AUTO: 0.08 THOUSAND/ÂΜL (ref 0–0.61)
EOSINOPHIL NFR BLD AUTO: 1 % (ref 0–6)
ERYTHROCYTE [DISTWIDTH] IN BLOOD BY AUTOMATED COUNT: 13.7 % (ref 11.6–15.1)
GFR SERPL CREATININE-BSD FRML MDRD: 51 ML/MIN/1.73SQ M
GLUCOSE SERPL-MCNC: 87 MG/DL (ref 65–140)
GLUCOSE SERPL-MCNC: 94 MG/DL (ref 65–140)
HCT VFR BLD AUTO: 36.9 % (ref 34.8–46.1)
HGB BLD-MCNC: 12 G/DL (ref 11.5–15.4)
IMM GRANULOCYTES # BLD AUTO: 0.03 THOUSAND/UL (ref 0–0.2)
IMM GRANULOCYTES NFR BLD AUTO: 0 % (ref 0–2)
INR PPP: 0.99 (ref 0.84–1.19)
LYMPHOCYTES # BLD AUTO: 1.06 THOUSANDS/ÂΜL (ref 0.6–4.47)
LYMPHOCYTES NFR BLD AUTO: 15 % (ref 14–44)
MCH RBC QN AUTO: 38 PG (ref 26.8–34.3)
MCHC RBC AUTO-ENTMCNC: 32.5 G/DL (ref 31.4–37.4)
MCV RBC AUTO: 117 FL (ref 82–98)
MONOCYTES # BLD AUTO: 0.47 THOUSAND/ÂΜL (ref 0.17–1.22)
MONOCYTES NFR BLD AUTO: 7 % (ref 4–12)
NEUTROPHILS # BLD AUTO: 5.52 THOUSANDS/ÂΜL (ref 1.85–7.62)
NEUTS SEG NFR BLD AUTO: 77 % (ref 43–75)
NRBC BLD AUTO-RTO: 0 /100 WBCS
P AXIS: 15 DEGREES
P AXIS: 75 DEGREES
PLATELET # BLD AUTO: 323 THOUSANDS/UL (ref 149–390)
PMV BLD AUTO: 10.5 FL (ref 8.9–12.7)
POTASSIUM SERPL-SCNC: 4.1 MMOL/L (ref 3.5–5.3)
PR INTERVAL: 128 MS
PR INTERVAL: 144 MS
PROT SERPL-MCNC: 7.5 G/DL (ref 6.4–8.4)
PROTHROMBIN TIME: 13.2 SECONDS (ref 11.6–14.5)
QRS AXIS: 3 DEGREES
QRS AXIS: 4 DEGREES
QRSD INTERVAL: 76 MS
QRSD INTERVAL: 78 MS
QT INTERVAL: 378 MS
QT INTERVAL: 400 MS
QTC INTERVAL: 434 MS
QTC INTERVAL: 449 MS
RBC # BLD AUTO: 3.16 MILLION/UL (ref 3.81–5.12)
SODIUM SERPL-SCNC: 135 MMOL/L (ref 135–147)
T WAVE AXIS: 11 DEGREES
T WAVE AXIS: 22 DEGREES
TSH SERPL DL<=0.05 MIU/L-ACNC: 6.59 UIU/ML (ref 0.45–4.5)
VENTRICULAR RATE: 71 BPM
VENTRICULAR RATE: 85 BPM
WBC # BLD AUTO: 7.19 THOUSAND/UL (ref 4.31–10.16)

## 2023-06-29 PROCEDURE — 93005 ELECTROCARDIOGRAM TRACING: CPT

## 2023-06-29 PROCEDURE — 96360 HYDRATION IV INFUSION INIT: CPT

## 2023-06-29 PROCEDURE — 84484 ASSAY OF TROPONIN QUANT: CPT | Performed by: EMERGENCY MEDICINE

## 2023-06-29 PROCEDURE — 85025 COMPLETE CBC W/AUTO DIFF WBC: CPT | Performed by: EMERGENCY MEDICINE

## 2023-06-29 PROCEDURE — 85610 PROTHROMBIN TIME: CPT | Performed by: EMERGENCY MEDICINE

## 2023-06-29 PROCEDURE — 99284 EMERGENCY DEPT VISIT MOD MDM: CPT | Performed by: EMERGENCY MEDICINE

## 2023-06-29 PROCEDURE — 93010 ELECTROCARDIOGRAM REPORT: CPT | Performed by: INTERNAL MEDICINE

## 2023-06-29 PROCEDURE — 80053 COMPREHEN METABOLIC PANEL: CPT | Performed by: EMERGENCY MEDICINE

## 2023-06-29 PROCEDURE — 36415 COLL VENOUS BLD VENIPUNCTURE: CPT | Performed by: EMERGENCY MEDICINE

## 2023-06-29 PROCEDURE — 71045 X-RAY EXAM CHEST 1 VIEW: CPT

## 2023-06-29 PROCEDURE — 84443 ASSAY THYROID STIM HORMONE: CPT | Performed by: EMERGENCY MEDICINE

## 2023-06-29 PROCEDURE — 85730 THROMBOPLASTIN TIME PARTIAL: CPT | Performed by: EMERGENCY MEDICINE

## 2023-06-29 PROCEDURE — 99285 EMERGENCY DEPT VISIT HI MDM: CPT

## 2023-06-29 PROCEDURE — 82948 REAGENT STRIP/BLOOD GLUCOSE: CPT

## 2023-06-29 RX ADMIN — SODIUM CHLORIDE 1000 ML: 0.9 INJECTION, SOLUTION INTRAVENOUS at 13:34

## 2023-06-29 NOTE — ED PROVIDER NOTES
History  Chief Complaint   Patient presents with   • Rapid Heart Rate     States she was going to bake cookies and started with sweating, heart racing , shaking. Called daughter. Denies CP. Had episode of diarrhea prior to coming to ED. Had nausea , no vomiting. EKG in progress     Patient has frequent problems with nervous stomach and diarrhea. Today she states she was well while she was baking cookies this morning. She had some borborygmi and upset stomach, and then became lightheaded and very sweaty. Patient thought she was going to pass out. Soon after she was able to have an episode of diarrhea with improvement of her symptoms. Patient denies chest pain. Felt lightheaded and weak but did not actually lose consciousness. Feels improved on arrival to the ED          Prior to Admission Medications   Prescriptions Last Dose Informant Patient Reported? Taking? LORazepam (ATIVAN) 0.5 mg tablet  Self Yes No   Sig: Take 0.5 mg by mouth 2 (two) times a day as needed for anxiety     amLODIPine (NORVASC) 2.5 mg tablet  Self Yes No   Sig: Take 2.5 mg by mouth daily   aspirin (ECOTRIN LOW STRENGTH) 81 mg EC tablet  Self No No   Sig: Take 1 tablet (81 mg total) by mouth daily   levothyroxine 50 mcg tablet  Self No No   Sig: Take 1 tablet (50 mcg total) by mouth daily   meclizine (ANTIVERT) 25 mg tablet  Self Yes No   Sig: Take 25 mg by mouth 3 (three) times a day as needed for dizziness   Patient not taking: Reported on 1/20/2023   metoprolol succinate (TOPROL-XL) 25 mg 24 hr tablet  Self Yes No   Sig: Take 1 tablet by mouth twice daily   omeprazole (PriLOSEC) 20 mg delayed release capsule  Self Yes No   Sig: Take 20 mg by mouth daily.    Patient not taking: Reported on 1/20/2023   rosuvastatin (CRESTOR) 40 MG tablet  Self Yes No   Sig: Take 40 mg by mouth every evening      Facility-Administered Medications: None       Past Medical History:   Diagnosis Date   • Anxiety    • GERD (gastroesophageal reflux disease) • Hypertension    • Hypothyroid    • Vertigo        Past Surgical History:   Procedure Laterality Date   • BREAST BIOPSY Left 1974   • COLONOSCOPY  04/2018   • HYSTERECTOMY         History reviewed. No pertinent family history. I have reviewed and agree with the history as documented. E-Cigarette/Vaping   • E-Cigarette Use Never User      E-Cigarette/Vaping Substances   • Nicotine No    • THC No    • CBD No    • Flavoring No    • Other No    • Unknown No      Social History     Tobacco Use   • Smoking status: Former   • Smokeless tobacco: Never   Vaping Use   • Vaping Use: Never used   Substance Use Topics   • Alcohol use: Not Currently   • Drug use: No       Review of Systems   Constitutional: Negative for chills and fever. HENT: Negative for congestion and sore throat. Eyes: Negative for visual disturbance. Respiratory: Negative for shortness of breath. Cardiovascular: Negative for chest pain and leg swelling. Gastrointestinal: Positive for diarrhea and nausea. Negative for abdominal pain. Genitourinary: Negative for dysuria. Musculoskeletal: Negative for arthralgias and back pain. Skin: Negative for rash. Neurological: Positive for syncope, weakness and light-headedness. Negative for headaches. Hematological: Does not bruise/bleed easily. Psychiatric/Behavioral: Negative for confusion. All other systems reviewed and are negative. Physical Exam  Physical Exam  Vitals and nursing note reviewed. Constitutional:       Appearance: Normal appearance. HENT:      Head: Normocephalic. Right Ear: External ear normal.      Left Ear: External ear normal.      Nose: Nose normal.      Mouth/Throat:      Pharynx: Oropharynx is clear. Eyes:      Conjunctiva/sclera: Conjunctivae normal.   Cardiovascular:      Rate and Rhythm: Normal rate and regular rhythm. Pulses: Normal pulses. Pulmonary:      Effort: Pulmonary effort is normal.   Abdominal:      General: Abdomen is flat. Musculoskeletal:         General: Normal range of motion. Cervical back: Normal range of motion. Skin:     General: Skin is warm and dry. Capillary Refill: Capillary refill takes less than 2 seconds. Neurological:      General: No focal deficit present. Mental Status: She is alert.    Psychiatric:         Mood and Affect: Mood normal.         Vital Signs  ED Triage Vitals [06/29/23 1154]   Temperature Pulse Respirations Blood Pressure SpO2   98 °F (36.7 °C) 83 20 (!) 195/87 97 %      Temp Source Heart Rate Source Patient Position - Orthostatic VS BP Location FiO2 (%)   Tympanic Monitor Sitting Left arm --      Pain Score       No Pain           Vitals:    06/29/23 1154 06/29/23 1245 06/29/23 1607   BP: (!) 195/87 (!) 187/81 162/70   Pulse: 83 64 61   Patient Position - Orthostatic VS: Sitting Lying Lying         Visual Acuity      ED Medications  Medications   sodium chloride 0.9 % bolus 1,000 mL (1,000 mL Intravenous New Bag 6/29/23 1334)       Diagnostic Studies  Results Reviewed     Procedure Component Value Units Date/Time    HS Troponin I 2hr [[id number]]  (Normal) Collected: 06/29/23 1606    Lab Status: Final result Specimen: Blood from Arm, Left Updated: 06/29/23 1636     hs TnI 2hr 5 ng/L      Delta 2hr hsTnI 0 ng/L     HS Troponin I 4hr [[id number]]     Lab Status: No result Specimen: Blood     TSH [[id number]]  (Abnormal) Collected: 06/29/23 1319    Lab Status: Final result Specimen: Blood from Arm, Left Updated: 06/29/23 1414     TSH 3RD GENERATON 6.589 uIU/mL     HS Troponin 0hr (reflex protocol) [[id number]]  (Normal) Collected: 06/29/23 1319    Lab Status: Final result Specimen: Blood from Arm, Left Updated: 06/29/23 1405     hs TnI 0hr 5 ng/L     Comprehensive metabolic panel [[id number]]  (Abnormal) Collected: 06/29/23 1319    Lab Status: Final result Specimen: Blood from Arm, Left Updated: 06/29/23 1358     Sodium 135 mmol/L      Potassium 4.1 mmol/L      Chloride 101 mmol/L      CO2 24 mmol/L      ANION GAP 10 mmol/L      BUN 30 mg/dL      Creatinine 0.98 mg/dL      Glucose 87 mg/dL      Calcium 9.2 mg/dL      AST 18 U/L      ALT 11 U/L      Alkaline Phosphatase 63 U/L      Total Protein 7.5 g/dL      Albumin 4.1 g/dL      Total Bilirubin 0.43 mg/dL      eGFR 51 ml/min/1.73sq m     Narrative:      [facility] guidelines for Chronic Kidney Disease (CKD):   •  Stage 1 with normal or high GFR (GFR > 90 mL/min/1.73 square meters)  •  Stage 2 Mild CKD (GFR = 60-89 mL/min/1.73 square meters)  •  Stage 3A Moderate CKD (GFR = 45-59 mL/min/1.73 square meters)  •  Stage 3B Moderate CKD (GFR = 30-44 mL/min/1.73 square meters)  •  Stage 4 Severe CKD (GFR = 15-29 mL/min/1.73 square meters)  •  Stage 5 End Stage CKD (GFR <15 mL/min/1.73 square meters)  Note: GFR calculation is accurate only with a steady state creatinine    Protime-INR [[id number]]  (Normal) Collected: 06/29/23 1319    Lab Status: Final result Specimen: Blood from Arm, Left Updated: 06/29/23 1350     Protime 13.2 seconds      INR 0.99    APTT [[id number]]  (Normal) Collected: 06/29/23 1319    Lab Status: Final result Specimen: Blood from Arm, Left Updated: 06/29/23 1350     PTT 31 seconds     CBC and differential [[id number]]  (Abnormal) Collected: 06/29/23 1319    Lab Status: Final result Specimen: Blood from Arm, Left Updated: 06/29/23 1333     WBC 7.19 Thousand/uL      RBC 3.16 Million/uL      Hemoglobin 12.0 g/dL      Hematocrit 36.9 %       fL      MCH 38.0 pg      MCHC 32.5 g/dL      RDW 13.7 %      MPV 10.5 fL      Platelets 509 Thousands/uL      nRBC 0 /100 WBCs      Neutrophils Relative 77 %      Immat GRANS % 0 %      Lymphocytes Relative 15 %      Monocytes Relative 7 %      Eosinophils Relative 1 %      Basophils Relative 0 %      Neutrophils Absolute 5.52 Thousands/µL      Immature Grans Absolute 0.03 Thousand/uL      Lymphocytes Absolute 1.06 Thousands/µL      Monocytes Absolute 0.47 Thousand/µL Eosinophils Absolute 0.08 Thousand/µL      Basophils Absolute 0.03 Thousands/µL     Fingerstick Glucose (POCT) [[id number]]  (Normal) Collected: 06/29/23 1221    Lab Status: Final result Updated: 06/29/23 1222     POC Glucose 94 mg/dl                  XR chest 1 view portable    (Results Pending)              Procedures  ECG 12 Lead Documentation Only    Date/Time: 6/29/2023 12:00 PM    Performed by: Vandana Draper MD  Authorized by: Vandana Draper MD    Indications / Diagnosis:  Weakness  ECG reviewed by me, the ED Provider: yes    Patient location:  ED  Interpretation:     Interpretation: normal    Rate:     ECG rate:  71    ECG rate assessment: normal    Rhythm:     Rhythm: sinus rhythm    Ectopy:     Ectopy: PAC    QRS:     QRS axis:  Normal    QRS intervals:  Normal  Conduction:     Conduction: normal    ST segments:     ST segments:  Normal  T waves:     T waves: normal               ED Course                               SBIRT 22yo+    Flowsheet Row Most Recent Value   Initial Alcohol Screen: US AUDIT-C     1. How often do you have a drink containing alcohol? 0 Filed at: 06/29/2023 1157   Audit-C Score 0 Filed at: 06/29/2023 1157   LOVE: How many times in the past year have you. .. Used an illegal drug or used a prescription medication for non-medical reasons? Never Filed at: 06/29/2023 1157                    Medical Decision Making  Weakness diaphoresis lightheadedness associated with abdominal discomfort and diarrhea suggestive of vasovagal episode. Patient appears asymptomatic at time of arrival.  Will check cardiac metabolic profile    Amount and/or Complexity of Data Reviewed  Labs: ordered. Radiology: ordered.           Disposition  Final diagnoses:   Vasovagal near syncope   Hypothyroid     Time reflects when diagnosis was documented in both MDM as applicable and the Disposition within this note     Time User Action Codes Description Comment    6/29/2023  4:43 PM Vandana Draper Add [R55] Vasovagal near syncope     6/29/2023  4:43 PM Taylor Brooks Add [E03.9] Hypothyroid       ED Disposition     ED Disposition   Discharge    Condition   Stable    Date/Time   u Jun 29, 2023  4:43 PM    Comment   [facility] discharge to home/self care. Follow-up Information     Follow up With Specialties Details Why Contact Nishi Mckinnon MD Internal Medicine Schedule an appointment as soon as possible for a visit   [facility]. [address] [place] [address]  [phone]            Patient's Medications   Discharge Prescriptions    No medications on file       No discharge procedures on file.     PDMP Review     None          ED Provider  Electronically Signed by           Taylor Brooks MD  06/29/23 1591

## 2023-06-29 NOTE — ED NOTES
Pt assisted to bathroom by daughter. X-ray now at bedside.       Mariama Willingham RN  06/29/23 5355

## 2023-08-29 ENCOUNTER — HOSPITAL ENCOUNTER (OUTPATIENT)
Dept: RADIOLOGY | Facility: HOSPITAL | Age: 88
Discharge: HOME | End: 2023-08-29
Payer: MEDICARE

## 2023-08-29 DIAGNOSIS — I65.23 BILATERAL CAROTID ARTERY STENOSIS: ICD-10-CM

## 2023-08-29 PROCEDURE — 93880 EXTRACRANIAL BILAT STUDY: CPT | Performed by: SURGERY

## 2023-08-29 PROCEDURE — 93880 EXTRACRANIAL BILAT STUDY: CPT

## 2023-08-30 ENCOUNTER — TRANSCRIBE ORDERS (OUTPATIENT)
Dept: VASCULAR SURGERY | Facility: CLINIC | Age: 88
End: 2023-08-30

## 2023-08-30 DIAGNOSIS — I65.23 BILATERAL CAROTID ARTERY STENOSIS: Primary | ICD-10-CM

## 2023-10-08 ENCOUNTER — HOSPITAL ENCOUNTER (EMERGENCY)
Facility: HOSPITAL | Age: 88
Discharge: HOME | End: 2023-10-08
Attending: EMERGENCY MEDICINE
Payer: MEDICARE

## 2023-10-08 VITALS
BODY MASS INDEX: 25.15 KG/M2 | RESPIRATION RATE: 18 BRPM | WEIGHT: 128.8 LBS | HEART RATE: 60 BPM | OXYGEN SATURATION: 96 % | SYSTOLIC BLOOD PRESSURE: 213 MMHG | TEMPERATURE: 98.4 F | DIASTOLIC BLOOD PRESSURE: 86 MMHG

## 2023-10-08 DIAGNOSIS — S81.811A LEG LACERATION, RIGHT, INITIAL ENCOUNTER: Primary | ICD-10-CM

## 2023-10-08 PROCEDURE — 90715 TDAP VACCINE 7 YRS/> IM: CPT | Performed by: EMERGENCY MEDICINE

## 2023-10-08 PROCEDURE — 90471 IMMUNIZATION ADMIN: CPT

## 2023-10-08 PROCEDURE — 99284 EMERGENCY DEPT VISIT MOD MDM: CPT | Performed by: EMERGENCY MEDICINE

## 2023-10-08 PROCEDURE — 99282 EMERGENCY DEPT VISIT SF MDM: CPT

## 2023-10-08 PROCEDURE — 12004 RPR S/N/AX/GEN/TRK7.6-12.5CM: CPT | Performed by: EMERGENCY MEDICINE

## 2023-10-08 RX ORDER — GINSENG 100 MG
1 CAPSULE ORAL ONCE
Status: COMPLETED | OUTPATIENT
Start: 2023-10-08 | End: 2023-10-08

## 2023-10-08 RX ADMIN — TETANUS TOXOID, REDUCED DIPHTHERIA TOXOID AND ACELLULAR PERTUSSIS VACCINE, ADSORBED 0.5 ML: 5; 2.5; 8; 8; 2.5 SUSPENSION INTRAMUSCULAR at 19:39

## 2023-10-08 RX ADMIN — BACITRACIN 1 SMALL APPLICATION: 500 OINTMENT TOPICAL at 19:38

## 2023-10-08 NOTE — ED PROVIDER NOTES
History  Chief Complaint   Patient presents with   • Extremity Laceration     States she had struck her right lower leg while getting in/out of car earlier today. Daughter describes skin tear right lower leg, several dressings in place. Patient on aspirin. 79 yo female banged right lower leg on car door about an hour prior to arrival.  She c/o laceration and is concerned about it becoming infected. She did not fall or hit head. No associated symptoms. No recent illness. Unknown last tetanus booster. History provided by:  Patient and relative      Prior to Admission Medications   Prescriptions Last Dose Informant Patient Reported? Taking? LORazepam (ATIVAN) 0.5 mg tablet  Self Yes No   Sig: Take 0.5 mg by mouth 2 (two) times a day as needed for anxiety     amLODIPine (NORVASC) 2.5 mg tablet  Self Yes No   Sig: Take 2.5 mg by mouth daily   aspirin (ECOTRIN LOW STRENGTH) 81 mg EC tablet  Self No No   Sig: Take 1 tablet (81 mg total) by mouth daily   levothyroxine 50 mcg tablet  Self No No   Sig: Take 1 tablet (50 mcg total) by mouth daily   meclizine (ANTIVERT) 25 mg tablet  Self Yes No   Sig: Take 25 mg by mouth 3 (three) times a day as needed for dizziness   Patient not taking: Reported on 1/20/2023   metoprolol succinate (TOPROL-XL) 25 mg 24 hr tablet  Self Yes No   Sig: Take 1 tablet by mouth twice daily   omeprazole (PriLOSEC) 20 mg delayed release capsule  Self Yes No   Sig: Take 20 mg by mouth daily. Patient not taking: Reported on 1/20/2023   rosuvastatin (CRESTOR) 40 MG tablet  Self Yes No   Sig: Take 40 mg by mouth every evening      Facility-Administered Medications: None       Past Medical History:   Diagnosis Date   • Anxiety    • GERD (gastroesophageal reflux disease)    • Hypertension    • Hypothyroid    • Vertigo        Past Surgical History:   Procedure Laterality Date   • BREAST BIOPSY Left 1974   • COLONOSCOPY  04/2018   • HYSTERECTOMY         History reviewed.  No pertinent family history. I have reviewed and agree with the history as documented. E-Cigarette/Vaping   • E-Cigarette Use Never User      E-Cigarette/Vaping Substances   • Nicotine No    • THC No    • CBD No    • Flavoring No    • Other No    • Unknown No      Social History     Tobacco Use   • Smoking status: Former   • Smokeless tobacco: Never   Vaping Use   • Vaping Use: Never used   Substance Use Topics   • Alcohol use: Not Currently   • Drug use: No       Review of Systems   Constitutional: Negative for fever. Respiratory: Negative for cough. Gastrointestinal: Negative for diarrhea and vomiting. Skin: Positive for wound. Physical Exam  Physical Exam  Vitals and nursing note reviewed. Constitutional:       General: She is not in acute distress. Appearance: She is not ill-appearing. HENT:      Head: Normocephalic and atraumatic. Pulmonary:      Effort: Pulmonary effort is normal. No respiratory distress. Musculoskeletal:         General: Signs of injury present. Comments: Right anterior mid tib/fib + 9 cm superficial C-shaped flap lac with significant bruising, no FB   Skin:     General: Skin is warm and dry. Neurological:      General: No focal deficit present. Mental Status: She is alert and oriented to person, place, and time.    Psychiatric:         Mood and Affect: Mood normal.         Behavior: Behavior normal.         Vital Signs  ED Triage Vitals [10/08/23 1900]   Temperature Pulse Respirations Blood Pressure SpO2   98.4 °F (36.9 °C) 60 18 (!) 213/86 96 %      Temp Source Heart Rate Source Patient Position - Orthostatic VS BP Location FiO2 (%)   Tympanic Monitor Sitting Left arm --      Pain Score       3           Vitals:    10/08/23 1900   BP: (!) 213/86   Pulse: 60   Patient Position - Orthostatic VS: Sitting         Visual Acuity      ED Medications  Medications   bacitracin topical ointment 1 small application (1 small application Topical Given 10/8/23 1938) tetanus-diphtheria-acellular pertussis (BOOSTRIX) IM injection 0.5 mL (0.5 mL Intramuscular Given 10/8/23 1939)       Diagnostic Studies  Results Reviewed     None                 No orders to display              Procedures  Universal Protocol:  Consent: Verbal consent obtained. Consent given by: patient  Patient identity confirmed: verbally with patient    Laceration repair    Date/Time: 10/8/2023 7:45 PM    Performed by: Pam Duarte MD  Authorized by: Pam Duarte MD  Body area: lower extremity  Location details: right lower leg  Laceration length: 9 cm  Contaminated: none. Foreign bodies: no foreign bodies  Tendon involvement: none  Nerve involvement: none  Anesthesia: local infiltration    Anesthesia:  Local Anesthetic: lidocaine 1% with epinephrine    Sedation:  Patient sedated: no      Wound Dehiscence:  Superficial Wound Dehiscence: simple closure      Procedure Details:  Preparation: Patient was prepped and draped in the usual sterile fashion. Irrigation solution: saline  Irrigation method: syringe  Amount of cleaning: standard  Debridement: none  Degree of undermining: none  Skin closure: 5-0 nylon  Number of sutures: 9  Technique: simple  Approximation: loose  Approximation difficulty: simple  Patient tolerance: patient tolerated the procedure well with no immediate complications  Comments: Nurse applied abx ointment and dressing               ED Course                               SBIRT 20yo+    Flowsheet Row Most Recent Value   Initial Alcohol Screen: US AUDIT-C     1. How often do you have a drink containing alcohol? 0 Filed at: 10/08/2023 1902   Audit-C Score 0 Filed at: 10/08/2023 1902   LOVE: How many times in the past year have you. .. Used an illegal drug or used a prescription medication for non-medical reasons? Never Filed at: 10/08/2023 1902                    Medical Decision Making  Pt.  And family member advised wound is under tension and skin is thin and friable there so wound is at risk for opening up and prolonged healing. Advised of wound care. Tetanus updated. Risk  OTC drugs. Prescription drug management. Disposition  Final diagnoses:   Leg laceration, right, initial encounter     Time reflects when diagnosis was documented in both MDM as applicable and the Disposition within this note     Time User Action Codes Description Comment    62/8/8417  1:64 PM Steph Mccord Add [Y02.212Y] Leg laceration, right, initial encounter       ED Disposition     ED Disposition   Discharge    Condition   Stable    Date/Time   Sun Oct 8, 2023  7:28 PM    [address] [place] [address] discharge to home/self care. Follow-up Information     Follow up With Specialties Details Why Latanya Monahan MD Internal Medicine In 3 days For wound re-check and 14 days for suture removal [facility]. [address] [address]  [phone]            Patient's Medications   Discharge Prescriptions    No medications on file       No discharge procedures on file.     PDMP Review     None          ED Provider  Electronically Signed by           Edith Rodriguez MD  42/71/50 [id number]

## 2023-10-08 NOTE — DISCHARGE INSTRUCTIONS
The skin is very thin and bruised and the wound is under tension so the sutures may tear or pop - try to be gentle with the wound and keep it covered and protected. Clean daily, gently with soap and water, pat dry, apply topical neosporin ointment. Return to ER if fever, severe pain, increasing redness, swelling, pus drainage.

## 2023-12-28 NOTE — PATIENT INSTRUCTIONS
Symptoms of stroke:  - Unable to speak or understand speech  - Unable to move one side of the body (arm or leg)  - Visual changes  - Call 911 for any symptoms of stroke      Carotid artery stenosis    -Continue with aspirin 81 and rosuvastatin 40  -No recent lipids which can be ordered/followed by PCP as appropriate  -Maintain good blood pressure and cholesterol control  -Patient education regarding carotid artery disease provided  -We reviewed the symptoms of stroke for which she should call 911  -Continue duplex surveillance every 6 months (due Mar '24) with office visit (can see Dr. Tellez at next)

## 2023-12-28 NOTE — PROGRESS NOTES
Assessment/Plan:    Bilateral carotid artery stenosis  -No symptoms of TIA/stroke    -CV duplex 8/29/23: R ICA 50-69% (164/39, ratio 2.03), L ICA 1-49% (108/33, ratio 1.73)    -CV duplex 11/21/22: R 50-69% (144/32); L ICA (105/33)  -CV duplex  7/28/21: R 50-69% (164/52), L ICA 1-49% (111/27)  -CV duplex  11/4/20: R  50-69% (134/30);  L ICA  < 50% 83/28     -CTA 10/4/2020:  B CAROL 65-70%; lumen 1.5 mm at maximal stenosis  -MRI brain 10/5/20: Mild chronic microvascular disease, age-related atrophy    Plan:  -Asymptomatic bilateral carotid artery stenosis which we can continue to monitor  -Duplex studies as above have been stable   -Continue with aspirin 81 and rosuvastatin 40  -No recent lipids which can be ordered/followed by PCP as appropriate  -Maintain good blood pressure and cholesterol control  -Patient education regarding carotid artery disease providedd  -We reviewed the symptoms of stroke for which she should call 911  -Patient will be 89 years old, she would like to continue with surveillance  -Continue duplex surveillance every 6 months (already scheduled 3/9/24) with office visit (can see Dr. Tellez at next)      Additional diagnoses:  Primary hypertension  Elevated cholesterol      Subjective:      Patient ID: Blank Mensah is a 89 y.o. female.    Patient presents today to review carotid duplex done 8/29/23. Denies any s/s of CVA.     HPI   Ms. Blank Mensah 89 y/o F hypothyroidism, thrombocytosis and is on hydroxyurea, vertigo, TIA (Oct '20) with left temporal HA, dizziness, lightheadedness and left facial twitching. Sx lasted for 5 minutes. Neurology thought symptoms were related to hypertensive urgency.  Work up s/f CTA which showed internal carotid artery stenosis 65-70% bilaterally.  Duplex studies have been somewhat discordant with mild to moderate carotid artery stenosis and velocities relatively low.      12/29/23: Patient reports that that she is doing well.  She has had no significant  hospitalizations or medical changes since she was last seen.  No symptoms of TIA/stroke.  No amaurosis fugax, vision changes, dysarthria unilateral numbness or weakness.  We reviewed the symptoms of stroke for which she should call 911. We reviewed her current and prior vascular studies which are stable. She will be 89 years old. Should like to continue with carotid duplex surveillance.     BUN/creat 20/0.86, LFT normal limits  No recent lipids which can be ordered/followed by PCP as appropriate. LDL 48 (8/2022)       Carotid duplex 8/29/23  Right        Impression  PSV  EDV (cm/s)  Direction of Flow  Ratio    Dist. ICA                112          31                      1.38    Mid. ICA                 114          26                      1.41    Prox. ICA    50 - 69%    164          39                      2.03    Dist CCA                  75          21                              Mid CCA                   81          18                      0.82    Prox CCA                  98          26                              Ext Carotid               77           0                      0.95    Prox Vert                 36          11  Antegrade                   Subclavian               117           0                                 Left         Impression  PSV  EDV (cm/s)  Direction of Flow  Ratio    Dist. ICA                143          55                      2.28    Mid. ICA                 184          68                      2.95    Prox. ICA    1 - 49%     108          33                      1.73    Dist CCA                  67          23                              Mid CCA                   63          20                      0.72    Prox CCA                  87          24                              Ext Carotid               50           5                      0.80    Prox Vert                 67          20  Antegrade                   Subclavian               121           0                                       CONCLUSION:  Impression  RIGHT:  There is 50-69% stenosis noted in the internal carotid artery. Plaque is  heterogenous and irregular.  Vertebral artery flow is antegrade. There is no significant subclavian artery  disease.  LEFT:  There is <50% stenosis noted in the internal carotid artery. Plaque is  heterogenous and irregular.  Vertebral artery flow is antegrade. There is no significant subclavian artery  disease.     Compared to previous study on 11/21/2022 , there is no significant changes.  Recommend repeat testing in 6 months as per protocol unless otherwise  indicated.    The following portions of the patient's history were reviewed and updated as appropriate: allergies, current medications, past family history, past medical history, past social history, past surgical history, and problem list.    Review of Systems   Constitutional: Negative.    HENT: Negative.     Eyes: Negative.    Respiratory: Negative.     Cardiovascular: Negative.    Gastrointestinal: Negative.    Endocrine: Negative.    Genitourinary: Negative.    Musculoskeletal: Negative.    Skin: Negative.    Allergic/Immunologic: Negative.    Neurological: Negative.    Hematological: Negative.    Psychiatric/Behavioral: Negative.         Objective:      /82 (BP Location: Right arm, Patient Position: Sitting)   Pulse 66   Ht 5' (1.524 m)   Wt 58.1 kg (128 lb)   BMI 25.00 kg/m²     No carotid artery bruits       Physical Exam  Vitals and nursing note reviewed.   Constitutional:       Appearance: She is well-developed.   HENT:      Head: Normocephalic and atraumatic.   Eyes:      Pupils: Pupils are equal, round, and reactive to light.   Neck:      Vascular: No carotid bruit or JVD.      Trachea: Trachea normal.   Cardiovascular:      Rate and Rhythm: Normal rate and regular rhythm.      Pulses:           Carotid pulses are 2+ on the right side and 2+ on the left side.       Radial pulses are 2+ on the right side and 2+ on the left  side.      Heart sounds: Normal heart sounds, S1 normal and S2 normal. No murmur heard.     No friction rub. No gallop.   Pulmonary:      Effort: Pulmonary effort is normal. No accessory muscle usage or respiratory distress.      Breath sounds: Normal breath sounds. No wheezing or rales.   Abdominal:      General: Bowel sounds are normal. There is no distension.      Palpations: Abdomen is soft.      Tenderness: There is no abdominal tenderness.   Musculoskeletal:         General: No deformity. Normal range of motion.      Cervical back: Neck supple.      Right lower leg: No edema.      Left lower leg: No edema.   Skin:     General: Skin is warm and dry.      Findings: No lesion or rash.      Nails: There is no clubbing.   Neurological:      Mental Status: She is alert and oriented to person, place, and time.      Comments: Grossly normal    Psychiatric:         Behavior: Behavior is cooperative.         I have reviewed and made appropriate changes to the review of systems input by the medical assistant.    Vitals:    12/29/23 1019   BP: 140/82   BP Location: Right arm   Patient Position: Sitting   Pulse: 66   Weight: 58.1 kg (128 lb)   Height: 5' (1.524 m)       Patient Active Problem List   Diagnosis    Acute pain    Hypothyroid    Hypertension    Anxiety    Paroxysmal SVT (supraventricular tachycardia)    Trouble breathing    TIA (transient ischemic attack)    Leukocytosis    Bilateral carotid artery stenosis    Abnormal LFTs (liver function tests)    Abnormal liver enzymes    Elevated cholesterol    Gastroesophageal reflux disease without esophagitis       Past Surgical History:   Procedure Laterality Date    BREAST BIOPSY Left 1974    COLONOSCOPY  04/2018    HYSTERECTOMY         History reviewed. No pertinent family history.    Social History     Socioeconomic History    Marital status: /Civil Union     Spouse name: Not on file    Number of children: Not on file    Years of education: Not on file     Highest education level: Not on file   Occupational History    Not on file   Tobacco Use    Smoking status: Former    Smokeless tobacco: Never   Vaping Use    Vaping status: Never Used   Substance and Sexual Activity    Alcohol use: Not Currently    Drug use: No    Sexual activity: Not on file   Other Topics Concern    Not on file   Social History Narrative    Not on file     Social Determinants of Health     Financial Resource Strain: Not on file   Food Insecurity: Not on file   Transportation Needs: Not on file   Physical Activity: Not on file   Stress: Not on file   Social Connections: Not on file   Intimate Partner Violence: Not on file   Housing Stability: Not on file       Allergies   Allergen Reactions    Iodinated Contrast Media     Dye Fdc Red [Red Dye - Food Allergy] Palpitations         Current Outpatient Medications:     amLODIPine (NORVASC) 2.5 mg tablet, Take 2.5 mg by mouth daily, Disp: , Rfl:     aspirin (ECOTRIN LOW STRENGTH) 81 mg EC tablet, Take 1 tablet (81 mg total) by mouth daily, Disp: 30 tablet, Rfl: 0    levothyroxine 50 mcg tablet, Take 1 tablet (50 mcg total) by mouth daily, Disp: 30 tablet, Rfl: 0    LORazepam (ATIVAN) 0.5 mg tablet, Take 0.5 mg by mouth 2 (two) times a day as needed for anxiety  , Disp: , Rfl:     meclizine (ANTIVERT) 25 mg tablet, Take 25 mg by mouth 3 (three) times a day as needed for dizziness, Disp: , Rfl:     metoprolol succinate (TOPROL-XL) 25 mg 24 hr tablet, Take 1 tablet by mouth twice daily, Disp: , Rfl:     omeprazole (PriLOSEC) 20 mg delayed release capsule, Take 20 mg by mouth daily, Disp: , Rfl:     rosuvastatin (CRESTOR) 40 MG tablet, Take 40 mg by mouth every evening, Disp: , Rfl:

## 2023-12-29 ENCOUNTER — OFFICE VISIT (OUTPATIENT)
Dept: VASCULAR SURGERY | Facility: CLINIC | Age: 88
End: 2023-12-29
Payer: MEDICARE

## 2023-12-29 VITALS
WEIGHT: 128 LBS | HEART RATE: 66 BPM | BODY MASS INDEX: 25.13 KG/M2 | DIASTOLIC BLOOD PRESSURE: 82 MMHG | SYSTOLIC BLOOD PRESSURE: 140 MMHG | HEIGHT: 60 IN

## 2023-12-29 DIAGNOSIS — E78.00 ELEVATED CHOLESTEROL: ICD-10-CM

## 2023-12-29 DIAGNOSIS — I65.23 BILATERAL CAROTID ARTERY STENOSIS: Primary | ICD-10-CM

## 2023-12-29 DIAGNOSIS — I10 PRIMARY HYPERTENSION: ICD-10-CM

## 2023-12-29 PROCEDURE — 99213 OFFICE O/P EST LOW 20 MIN: CPT | Performed by: PHYSICIAN ASSISTANT

## 2024-03-11 ENCOUNTER — HOSPITAL ENCOUNTER (OUTPATIENT)
Dept: RADIOLOGY | Facility: HOSPITAL | Age: 89
Discharge: HOME | End: 2024-03-11
Payer: MEDICARE

## 2024-03-11 DIAGNOSIS — I65.23 BILATERAL CAROTID ARTERY STENOSIS: ICD-10-CM

## 2024-03-11 PROCEDURE — 93880 EXTRACRANIAL BILAT STUDY: CPT | Performed by: SURGERY

## 2024-03-11 PROCEDURE — 93880 EXTRACRANIAL BILAT STUDY: CPT

## 2024-03-13 NOTE — PROGRESS NOTES
Assessment/Plan:    Bilateral carotid artery stenosis  -No symptoms of TIA/stroke     -CV duplex  3/11/24: R 50-69% (146/30, ratio 2.46); L ICA 1-49% (123/29)  -CV duplex  8/29/23: R 50-69% (164/39, ratio 2.03), L ICA 1-49% (108/33, ratio 1.73)   -CV duplex 11/21/22: R 50-69% (144/32); L ICA (105/33)  -CV duplex  7/28/21: R 50-69% (164/52), L ICA 1-49% (111/27)  -CV duplex  11/4/20: R  50-69% (134/30);  L ICA  < 50% (83/28)     -CTA 10/4/2020:  B CAROL 65-70%; lumen 1.5 mm at maximal stenosis  -MRI brain 10/5/20: Mild chronic microvascular disease, age-related atrophy     Plan:  -Stable, asymptomatic bilateral carotid artery stenosis   -Following mild to moderate CAROL since at least 2020 and duplex studies remain stable   -Review of duplex shows R ICA 50-69%  cm/s and EDV 30 cm/s; L ICA 1-49% with  cm/s  -Patient will be 89 years old and wants to continue with surviellance  -Continue with aspirin 81 and rosuvastatin 40  -No recent lipids which can be ordered/followed by PCP as appropriate  -Maintain good blood pressure and cholesterol control  -Patient education regarding carotid artery disease provided  -We reviewed the symptoms of stroke for which she should call 911  -Continue duplex surveillance in 6 months with office visit         Diagnoses and all orders for this visit:  Bilateral carotid artery stenosis  -     VAS carotid complete study; Future  Primary hypertension  Elevated cholesterol      Subjective:      Patient ID: Blank Mensah is a 89 y.o. female.  Patient presents today to review carotid duplex done 3/11/24. Denies any s/s of CVA.     HPI    Ms. Blank Mensah 89 y/o F hypothyroidism, thrombocytosis and is on hydroxyurea, vertigo, TIA (Oct '20) with left temporal HA, dizziness, lightheadedness and left facial twitching. Sx lasted for 5 minutes. Neurology thought symptoms were related to hypertensive urgency.  Work up s/f CTA which showed internal carotid artery stenosis 65-70%  "bilaterally.  Duplex studies have been somewhat discordant with mild to moderate carotid artery stenosis and velocities relatively low.      12/29/23: Patient reports that that she is doing well.  She has had no significant hospitalizations or medical changes since she was last seen.  No symptoms of TIA/stroke.  No amaurosis fugax, vision changes, dysarthria unilateral numbness or weakness.  We reviewed the symptoms of stroke for which she should call 911. We reviewed her current and prior vascular studies which are stable. She will be 89 years old. Should like to continue with carotid duplex surveillance.      BUN/creat 20/0.86, LFT normal limits  No recent lipids which can be ordered/followed by PCP as appropriate. LDL 48 (8/2022)    3/15/24: Patient returns to review recent carotid duplex study accompanied by her daughter.  Tomorrow will be her 89th birthday and she has related that she would like to continue with surveillance.    She has no symptoms of TIA/stroke.  No amaurosis fugax, vision changes, dysarthria unilateral numbness or weakness.  We reviewed the symptoms stroke which she should call 911.    During the past week, she has had 2 episodes of \"fast heartbeat\" for which she takes deep breaths.  She reports a long history of similar episodes but these were for about an hour long.  During the episodes she takes deep breaths and on when she took acetaminophen and the other Ativan which helped.  Since she has had similar episodes for a long time, she has not overly concerned but I asked her to contact her family doctor to further discuss as this may represent an arrhythmia.  Also she has had some mild lower extremity edema.  There is no associated shortness of breath.  She does appear to have evidence of mild venous insufficiency.  Good 2+ pulses in the feet.  There is trace to mild lower extremity edema so I advised her that she can wear a light compression if she wants to wear them.     We reviewed her " recent carotid duplex study and medications.  She would like to continue surveillance so we will repeat duplex in 6 months.  If that 1 remained stable we could probably extend to annual surveillance. We discussed the indications for continued aspirin and statin therapy.  i    CV duplex 3/11/24:  CLINICAL:  Indications:  6 month surveillance of carotid artery disease.  Patient is asymptomatic at  this time.  Operative History:  Patient has no cardiovascular surgeries  Risk Factors:  The patient has history of HTN, Hyperlipidemia and previous smoking (quit  >10yrs ago).  Clinical:  Right Pressure:  138/80 mm Hg, Left Pressure:  140/80 mm Hg.     FINDINGS:     Right        Impression  PSV  EDV (cm/s)  Direction of Flow  Ratio    Dist. ICA                 67          17                      1.12    Mid. ICA                  85          20                      1.43    Prox. ICA    50 - 69%    146          30                      2.46    Dist CCA                  60          12                              Mid CCA                   59          11                      1.06    Prox CCA                  56          11                      1.12    Ext Carotid               88           6                      1.49    Prox Vert                 42           8  Antegrade                   Subclavian                65           7                              Innominate                50           0                                 Left         Impression  PSV  EDV (cm/s)  Direction of Flow  Ratio    Dist. ICA                116          32                      1.91    Mid. ICA                 122          36                      2.00    Prox. ICA    1 - 49%     123          29                      2.03    Dist CCA                  61          15                              Mid CCA                   61          14                      1.14    Prox CCA                  53           8                              Ext Carotid                46           0                      0.76    Prox Vert                 60          17  Antegrade                   Subclavian                88           0                                       CONCLUSION:  Impression  RIGHT:  There is 50-69% stenosis noted in the internal carotid artery. Plaque is  heterogenous and irregular.  Vertebral artery flow is antegrade. There is no significant subclavian artery  disease.  LEFT:  There is <50% stenosis noted in the internal carotid artery. Plaque is  heterogenous and irregular.  Vertebral artery flow is antegrade. There is no significant subclavian artery  disease.     Compared to previous study on 8/29/2023, there is no change.  Recommend repeat testing in 6 months as per protocol unless otherwise  indicated.      The following portions of the patient's history were reviewed and updated as appropriate: allergies, current medications, past family history, past medical history, past social history, past surgical history, and problem list.    Review of Systems   Constitutional: Negative.    HENT: Negative.     Eyes: Negative.    Respiratory: Negative.     Cardiovascular:  Positive for leg swelling.   Gastrointestinal: Negative.    Endocrine: Negative.    Genitourinary: Negative.    Musculoskeletal: Negative.    Skin: Negative.    Allergic/Immunologic: Negative.    Neurological: Negative.    Hematological: Negative.    Psychiatric/Behavioral: Negative.           Objective:    /80 (BP Location: Right arm, Patient Position: Sitting)   Pulse (!) 50   Ht 5' (1.524 m)   Wt 58.1 kg (128 lb)   BMI 25.00 kg/m²   No carotid artery bruits  Decreased hearing     Physical Exam  Vitals and nursing note reviewed.   Constitutional:       Appearance: She is well-developed.   HENT:      Head: Normocephalic and atraumatic.   Eyes:      Pupils: Pupils are equal, round, and reactive to light.   Neck:      Thyroid: No thyromegaly.      Vascular: No JVD.      Trachea: Trachea normal.    Cardiovascular:      Rate and Rhythm: Normal rate and regular rhythm.      Pulses:           Carotid pulses are 2+ on the right side and 2+ on the left side.       Radial pulses are 2+ on the right side and 2+ on the left side.        Dorsalis pedis pulses are 1+ on the right side and 1+ on the left side.      Heart sounds: Normal heart sounds, S1 normal and S2 normal. No murmur heard.     No friction rub. No gallop.   Pulmonary:      Effort: Pulmonary effort is normal. No accessory muscle usage or respiratory distress.      Breath sounds: Normal breath sounds. No wheezing or rales.   Abdominal:      General: Bowel sounds are normal. There is no distension.      Palpations: Abdomen is soft.      Tenderness: There is no abdominal tenderness.   Musculoskeletal:         General: No deformity. Normal range of motion.      Cervical back: Neck supple.      Right lower leg: Edema (trace edema) present.      Left lower leg: Edema (trace edema) present.   Skin:     General: Skin is warm and dry.      Findings: No lesion or rash.      Nails: There is no clubbing.   Neurological:      Mental Status: She is alert and oriented to person, place, and time.      Comments: Grossly normal    Psychiatric:         Behavior: Behavior is cooperative.             I have reviewed and made appropriate changes to the review of systems input by the medical assistant.    Vitals:    03/15/24 0936   BP: 124/80   BP Location: Right arm   Patient Position: Sitting   Pulse: (!) 50   Weight: 58.1 kg (128 lb)   Height: 5' (1.524 m)       Patient Active Problem List   Diagnosis    Acute pain    Hypothyroid    Hypertension    Anxiety    Paroxysmal SVT (supraventricular tachycardia)    Trouble breathing    TIA (transient ischemic attack)    Leukocytosis    Bilateral carotid artery stenosis    Abnormal LFTs (liver function tests)    Abnormal liver enzymes    Elevated cholesterol    Gastroesophageal reflux disease without esophagitis       Past Surgical  History:   Procedure Laterality Date    BREAST BIOPSY Left 1974    COLONOSCOPY  04/2018    HYSTERECTOMY         History reviewed. No pertinent family history.    Social History     Socioeconomic History    Marital status: /Civil Union     Spouse name: Not on file    Number of children: Not on file    Years of education: Not on file    Highest education level: Not on file   Occupational History    Not on file   Tobacco Use    Smoking status: Former    Smokeless tobacco: Never   Vaping Use    Vaping status: Never Used   Substance and Sexual Activity    Alcohol use: Not Currently    Drug use: No    Sexual activity: Not on file   Other Topics Concern    Not on file   Social History Narrative    Not on file     Social Determinants of Health     Financial Resource Strain: Not on file   Food Insecurity: Not on file   Transportation Needs: Not on file   Physical Activity: Not on file   Stress: Not on file   Social Connections: Not on file   Intimate Partner Violence: Not on file   Housing Stability: Not on file       Allergies   Allergen Reactions    Iodinated Contrast Media     Dye Fdc Red [Red Dye - Food Allergy] Palpitations         Current Outpatient Medications:     amLODIPine (NORVASC) 2.5 mg tablet, Take 2.5 mg by mouth daily, Disp: , Rfl:     aspirin (ECOTRIN LOW STRENGTH) 81 mg EC tablet, Take 1 tablet (81 mg total) by mouth daily, Disp: 30 tablet, Rfl: 0    levothyroxine 50 mcg tablet, Take 1 tablet (50 mcg total) by mouth daily, Disp: 30 tablet, Rfl: 0    LORazepam (ATIVAN) 0.5 mg tablet, Take 0.5 mg by mouth 2 (two) times a day as needed for anxiety  , Disp: , Rfl:     meclizine (ANTIVERT) 25 mg tablet, Take 25 mg by mouth 3 (three) times a day as needed for dizziness, Disp: , Rfl:     metoprolol succinate (TOPROL-XL) 25 mg 24 hr tablet, Take 1 tablet by mouth twice daily, Disp: , Rfl:     omeprazole (PriLOSEC) 20 mg delayed release capsule, Take 20 mg by mouth daily, Disp: , Rfl:     rosuvastatin  (CRESTOR) 40 MG tablet, Take 40 mg by mouth every evening, Disp: , Rfl:

## 2024-03-15 ENCOUNTER — OFFICE VISIT (OUTPATIENT)
Dept: VASCULAR SURGERY | Facility: CLINIC | Age: 89
End: 2024-03-15
Payer: MEDICARE

## 2024-03-15 VITALS
SYSTOLIC BLOOD PRESSURE: 124 MMHG | HEIGHT: 60 IN | BODY MASS INDEX: 25.13 KG/M2 | WEIGHT: 128 LBS | HEART RATE: 50 BPM | DIASTOLIC BLOOD PRESSURE: 80 MMHG

## 2024-03-15 DIAGNOSIS — I10 PRIMARY HYPERTENSION: ICD-10-CM

## 2024-03-15 DIAGNOSIS — E78.00 ELEVATED CHOLESTEROL: ICD-10-CM

## 2024-03-15 DIAGNOSIS — I65.23 BILATERAL CAROTID ARTERY STENOSIS: Primary | ICD-10-CM

## 2024-03-15 PROCEDURE — 99213 OFFICE O/P EST LOW 20 MIN: CPT | Performed by: PHYSICIAN ASSISTANT

## 2024-03-15 NOTE — PATIENT INSTRUCTIONS
Symptoms of stroke:  - Unable to speak or understand speech  - Unable to move one side of the body (arm or leg)  - Visual changes  - Call 911 for any symptoms of stroke        -Stable, asymptomatic bilateral carotid artery stenosis     -Continue with aspirin 81 and rosuvastatin 40  -Maintain good blood pressure and cholesterol control  -Patient education regarding carotid artery disease provided  -Continue duplex surveillance in 6 months with office visit

## 2024-08-27 ENCOUNTER — OFFICE VISIT (OUTPATIENT)
Dept: FAMILY MEDICINE CLINIC | Facility: CLINIC | Age: 89
End: 2024-08-27
Payer: MEDICARE

## 2024-08-27 VITALS
BODY MASS INDEX: 25.05 KG/M2 | HEART RATE: 63 BPM | SYSTOLIC BLOOD PRESSURE: 154 MMHG | WEIGHT: 127.6 LBS | DIASTOLIC BLOOD PRESSURE: 86 MMHG | HEIGHT: 60 IN | RESPIRATION RATE: 16 BRPM | TEMPERATURE: 98.5 F

## 2024-08-27 DIAGNOSIS — K21.9 GASTROESOPHAGEAL REFLUX DISEASE WITHOUT ESOPHAGITIS: ICD-10-CM

## 2024-08-27 DIAGNOSIS — R26.89 POOR BALANCE: ICD-10-CM

## 2024-08-27 DIAGNOSIS — E03.9 HYPOTHYROID: ICD-10-CM

## 2024-08-27 DIAGNOSIS — I10 ESSENTIAL HYPERTENSION: ICD-10-CM

## 2024-08-27 DIAGNOSIS — D47.3 ESSENTIAL THROMBOCYTOSIS (HCC): Primary | ICD-10-CM

## 2024-08-27 DIAGNOSIS — I47.10 PAROXYSMAL SVT (SUPRAVENTRICULAR TACHYCARDIA): ICD-10-CM

## 2024-08-27 DIAGNOSIS — E78.5 DYSLIPIDEMIA: ICD-10-CM

## 2024-08-27 DIAGNOSIS — E03.9 PRIMARY HYPOTHYROIDISM: ICD-10-CM

## 2024-08-27 PROBLEM — R79.89 ABNORMAL LFTS (LIVER FUNCTION TESTS): Status: RESOLVED | Noted: 2021-01-13 | Resolved: 2024-08-27

## 2024-08-27 PROBLEM — R74.8 ABNORMAL LIVER ENZYMES: Status: RESOLVED | Noted: 2021-02-10 | Resolved: 2024-08-27

## 2024-08-27 PROBLEM — R52 ACUTE PAIN: Status: RESOLVED | Noted: 2018-05-07 | Resolved: 2024-08-27

## 2024-08-27 PROCEDURE — 99204 OFFICE O/P NEW MOD 45 MIN: CPT | Performed by: FAMILY MEDICINE

## 2024-08-27 RX ORDER — AMLODIPINE BESYLATE 2.5 MG/1
2.5 TABLET ORAL DAILY
Qty: 90 TABLET | Refills: 1 | Status: SHIPPED | OUTPATIENT
Start: 2024-08-27

## 2024-08-27 RX ORDER — METOPROLOL SUCCINATE 25 MG/1
25 TABLET, EXTENDED RELEASE ORAL 2 TIMES DAILY
Qty: 90 TABLET | Refills: 1 | Status: SHIPPED | OUTPATIENT
Start: 2024-08-27

## 2024-08-27 RX ORDER — LEVOTHYROXINE SODIUM 50 UG/1
50 TABLET ORAL DAILY
Qty: 90 TABLET | Refills: 1 | Status: SHIPPED | OUTPATIENT
Start: 2024-08-27

## 2024-08-27 RX ORDER — ROSUVASTATIN CALCIUM 40 MG/1
40 TABLET, COATED ORAL EVERY EVENING
Qty: 90 TABLET | Refills: 1 | Status: SHIPPED | OUTPATIENT
Start: 2024-08-27

## 2024-08-27 RX ORDER — HYDROXYUREA 500 MG/1
CAPSULE ORAL DAILY
COMMUNITY
Start: 2024-08-20

## 2024-08-27 NOTE — ASSESSMENT & PLAN NOTE
Stable  Continue rosuvastatin 40 mg daily  Orders:    rosuvastatin (CRESTOR) 40 MG tablet; Take 1 tablet (40 mg total) by mouth every evening

## 2024-08-27 NOTE — ASSESSMENT & PLAN NOTE
Stable  Continue levothyroxine 50 mcg daily  Orders:    T4, free; Future    TSH, 3rd generation; Future

## 2024-08-27 NOTE — ASSESSMENT & PLAN NOTE
Blood pressure is well-controlled  Continue amlodipine 2.5 mg daily  Orders:    CBC; Future    Comprehensive metabolic panel; Future    Lipid Panel with Direct LDL reflex; Future    amLODIPine (NORVASC) 2.5 mg tablet; Take 1 tablet (2.5 mg total) by mouth daily    metoprolol succinate (TOPROL-XL) 25 mg 24 hr tablet; Take 1 tablet (25 mg total) by mouth 2 (two) times a day

## 2024-08-27 NOTE — PROGRESS NOTES
Ambulatory Visit  Name: Blank Mensah      : 3/16/1934      MRN: [id number]  Encounter Provider: Andreia Barba DO  Encounter Date: 2024   Encounter department: [facility]      Assessment & Plan  Paroxysmal SVT (supraventricular tachycardia)  Stable  Continue metoprolol 25 mg twice daily       Essential thrombocytosis (HCC)  Managed by Dr. Rodriguez  Reviewed CBC done on 2024       Primary hypothyroidism  Stable  Continue levothyroxine 50 mcg daily  Orders:  •  T4, free; Future  •  TSH, 3rd generation; Future    Poor balance    Orders:  •  Ambulatory referral to Physical Therapy; Future      Hypothyroid    Orders:  •  levothyroxine 50 mcg tablet; Take 1 tablet (50 mcg total) by mouth daily      Gastroesophageal reflux disease without esophagitis  Stable  Continue Meprazole 20 mg daily  Orders:  •  omeprazole (PriLOSEC) 20 mg delayed release capsule; Take 1 capsule (20 mg total) by mouth daily    Dyslipidemia  Stable  Continue rosuvastatin 40 mg daily  Orders:  •  rosuvastatin (CRESTOR) 40 MG tablet; Take 1 tablet (40 mg total) by mouth every evening    Essential hypertension  Blood pressure is well-controlled  Continue amlodipine 2.5 mg daily  Orders:  •  CBC; Future  •  Comprehensive metabolic panel; Future  •  Lipid Panel with Direct LDL reflex; Future  •  amLODIPine (NORVASC) 2.5 mg tablet; Take 1 tablet (2.5 mg total) by mouth daily  •  metoprolol succinate (TOPROL-XL) 25 mg 24 hr tablet; Take 1 tablet (25 mg total) by mouth 2 (two) times a day      Advised to stop lorazepam     Return in about 3 months (around 2024) for Annual Wellness Visit.    History of Present Illness     She is here to establish for primary care.   Her previous physician retired - Dr. Dodge.    Lives alone on one floor.     She has noticed her balance is not as good as it used to be. She has done balance center, but has stopped doing the exercises.  When she gone to the balance center previously was for  vertigo.    She reports that she had been on lorazepam for very rare use in the past.  She did get very anxious last month 2 weeks after her sister had passed.  Her daughter did notice that she had more issues with her balance after taking the lorazepam.      Review of Systems   Constitutional: Negative.    Respiratory: Negative.     Cardiovascular: Negative.      Objective     /86   Pulse 63   Temp 98.5 °F (36.9 °C)   Resp 16   Ht 5' (1.524 m)   Wt 57.9 kg (127 lb 9.6 oz)   BMI 24.92 kg/m²     Physical Exam  Vitals and nursing note reviewed.   Constitutional:       General: She is not in acute distress.     Appearance: She is well-developed.   HENT:      Head: Normocephalic and atraumatic.      Right Ear: Tympanic membrane normal.      Left Ear: Tympanic membrane normal.   Cardiovascular:      Rate and Rhythm: Normal rate and regular rhythm.      Heart sounds: No murmur heard.  Pulmonary:      Effort: Pulmonary effort is normal. No respiratory distress.      Breath sounds: Normal breath sounds.   Musculoskeletal:      Cervical back: Neck supple.   Neurological:      Mental Status: She is alert.         Andreia Barba,

## 2024-08-27 NOTE — ASSESSMENT & PLAN NOTE
Stable  Continue Meprazole 20 mg daily  Orders:    omeprazole (PriLOSEC) 20 mg delayed release capsule; Take 1 capsule (20 mg total) by mouth daily

## 2024-09-11 ENCOUNTER — HOSPITAL ENCOUNTER (OUTPATIENT)
Dept: RADIOLOGY | Facility: HOSPITAL | Age: 89
Discharge: HOME | End: 2024-09-11
Payer: MEDICARE

## 2024-09-11 DIAGNOSIS — I65.23 BILATERAL CAROTID ARTERY STENOSIS: ICD-10-CM

## 2024-09-11 PROCEDURE — 93880 EXTRACRANIAL BILAT STUDY: CPT

## 2024-09-11 PROCEDURE — 93880 EXTRACRANIAL BILAT STUDY: CPT | Performed by: SURGERY

## 2024-09-16 ENCOUNTER — TELEPHONE (OUTPATIENT)
Age: 89
End: 2024-09-16

## 2024-09-16 DIAGNOSIS — E03.9 HYPOTHYROID: ICD-10-CM

## 2024-09-16 RX ORDER — LEVOTHYROXINE SODIUM 75 UG/1
75 TABLET ORAL DAILY
Qty: 90 TABLET | Refills: 0 | Status: SHIPPED | OUTPATIENT
Start: 2024-09-16

## 2024-09-16 NOTE — TELEPHONE ENCOUNTER
Pt called as she is confused about her levothyroxine dose. She realized that she has been taking 75 mcg from her previous pcp. She is not sure when it changed but it is definitely 75 mcg not 50 mcg. What should she take until she does her labs in November? Please advise.

## 2024-09-16 NOTE — TELEPHONE ENCOUNTER
She should continue 75 mcg daily  I sent a new prescription into [facility]  Thank you,  Dr. Barba

## 2024-11-26 ENCOUNTER — OFFICE VISIT (OUTPATIENT)
Dept: FAMILY MEDICINE CLINIC | Facility: CLINIC | Age: 89
End: 2024-11-26
Payer: MEDICARE

## 2024-11-26 VITALS
HEIGHT: 60 IN | HEART RATE: 72 BPM | RESPIRATION RATE: 18 BRPM | TEMPERATURE: 97.2 F | SYSTOLIC BLOOD PRESSURE: 130 MMHG | OXYGEN SATURATION: 98 % | BODY MASS INDEX: 24.15 KG/M2 | WEIGHT: 123 LBS | DIASTOLIC BLOOD PRESSURE: 70 MMHG

## 2024-11-26 DIAGNOSIS — E03.9 PRIMARY HYPOTHYROIDISM: ICD-10-CM

## 2024-11-26 DIAGNOSIS — I10 ESSENTIAL HYPERTENSION: ICD-10-CM

## 2024-11-26 DIAGNOSIS — N39.0 ACUTE UTI: ICD-10-CM

## 2024-11-26 DIAGNOSIS — Z00.00 MEDICARE ANNUAL WELLNESS VISIT, SUBSEQUENT: Primary | ICD-10-CM

## 2024-11-26 DIAGNOSIS — E78.5 DYSLIPIDEMIA: ICD-10-CM

## 2024-11-26 LAB
SL AMB  POCT GLUCOSE, UA: ABNORMAL
SL AMB LEUKOCYTE ESTERASE,UA: ABNORMAL
SL AMB POCT BILIRUBIN,UA: ABNORMAL
SL AMB POCT BLOOD,UA: ABNORMAL
SL AMB POCT CLARITY,UA: ABNORMAL
SL AMB POCT COLOR,UA: ABNORMAL
SL AMB POCT KETONES,UA: ABNORMAL
SL AMB POCT NITRITE,UA: ABNORMAL
SL AMB POCT PH,UA: 5.5
SL AMB POCT SPECIFIC GRAVITY,UA: >=1.03
SL AMB POCT URINE PROTEIN: 100
SL AMB POCT UROBILINOGEN: 0.2

## 2024-11-26 PROCEDURE — 87186 SC STD MICRODIL/AGAR DIL: CPT | Performed by: FAMILY MEDICINE

## 2024-11-26 PROCEDURE — 87086 URINE CULTURE/COLONY COUNT: CPT | Performed by: FAMILY MEDICINE

## 2024-11-26 PROCEDURE — 87077 CULTURE AEROBIC IDENTIFY: CPT | Performed by: FAMILY MEDICINE

## 2024-11-26 PROCEDURE — 81003 URINALYSIS AUTO W/O SCOPE: CPT | Performed by: FAMILY MEDICINE

## 2024-11-26 PROCEDURE — 99214 OFFICE O/P EST MOD 30 MIN: CPT | Performed by: FAMILY MEDICINE

## 2024-11-26 PROCEDURE — G0438 PPPS, INITIAL VISIT: HCPCS | Performed by: FAMILY MEDICINE

## 2024-11-26 RX ORDER — CEFUROXIME AXETIL 500 MG/1
500 TABLET ORAL EVERY 12 HOURS SCHEDULED
Qty: 14 TABLET | Refills: 0 | Status: SHIPPED | OUTPATIENT
Start: 2024-11-26 | End: 2024-12-03

## 2024-11-26 NOTE — PROGRESS NOTES
Name: Blank Mensah      : 3/16/1934      MRN: [id number]  Encounter Provider: Andreia Barba DO  Encounter Date: 2024   Encounter department: [facility]    Assessment & Plan  Medicare annual wellness visit, subsequent         Essential hypertension  Well-controlled  Continue amlodipine 2.5 mg daily and metoprolol 25 mg daily  Orders:    CBC; Future    Comprehensive metabolic panel; Future    Lipid Panel with Direct LDL reflex; Future    Dyslipidemia  Stable  Continue rosuvastatin 40 mg daily  Orders:    Comprehensive metabolic panel; Future    Lipid Panel with Direct LDL reflex; Future    Primary hypothyroidism  Stable  Continue levothyroxine 70 mcg daily  Orders:    TSH, 3rd generation; Future    T4, free; Future    Acute UTI    Orders:    cefuroxime (CEFTIN) 500 mg tablet; Take 1 tablet (500 mg total) by mouth every 12 (twelve) hours for 7 days    Urine culture    POCT urine dip auto non-scope       Preventive health issues were discussed with patient, and age appropriate screening tests were ordered as noted in patient's After Visit Summary. Personalized health advice and appropriate referrals for health education or preventive services given if needed, as noted in patient's After Visit Summary.    Return in about 6 months (around 2025) for Next scheduled follow up.    History of Present Illness     Her diverticulitis has been flaring. She has been taking bentyl. Her belly pain has gotten better,    She has been tired and having low back pain. She is worried about a UTI.        Patient Care Team:  Andreia Barba DO as PCP - General (Family Medicine)  Kirstie Rocha PA-C (Vascular Surgery)    Review of Systems  Medical History Reviewed by provider this encounter:  Tobacco  Allergies  Meds  Problems  Med Hx  Surg Hx  Fam Hx       Annual Wellness Visit Questionnaire   Blank is here for her Subsequent Wellness visit.     Health Risk Assessment:   Patient rates overall health as very  good. Patient feels that their physical health rating is same. Patient is satisfied with their life. Eyesight was rated as same. Hearing was rated as same. Patient feels that their emotional and mental health rating is same. Patients states they are never, rarely angry. Patient states they are sometimes unusually tired/fatigued. Pain experienced in the last 7 days has been some. Patient's pain rating has been 8/10. Patient states that she has experienced no weight loss or gain in last 6 months.     Depression Screening:   PHQ-2 Score: 0      Fall Risk Screening:   In the past year, patient has experienced: history of falling in past year    Number of falls: 1  Injured during fall?: No    Feels unsteady when standing or walking?: No    Worried about falling?: No      Urinary Incontinence Screening:   Patient has not leaked urine accidently in the last six months.     Home Safety:  Patient has trouble with stairs inside or outside of their home. Patient has working smoke alarms and has working carbon monoxide detector. Home safety hazards include: none.     Nutrition:   Current diet is Regular.     Medications:   Patient is not currently taking any over-the-counter supplements. Patient is able to manage medications.     Activities of Daily Living (ADLs)/Instrumental Activities of Daily Living (IADLs):   Walk and transfer into and out of bed and chair?: Yes  Dress and groom yourself?: Yes    Bathe or shower yourself?: Yes    Feed yourself? Yes  Do your laundry/housekeeping?: Yes  Manage your money, pay your bills and track your expenses?: Yes  Make your own meals?: Yes    Do your own shopping?: Yes    Previous Hospitalizations:   Any hospitalizations or ED visits within the last 12 months?: No      Advance Care Planning:   Living will: Yes    Durable POA for healthcare: Yes    Advanced directive: Yes    Advanced directive counseling given: Yes    End of Life Decisions reviewed with patient: Yes    Provider agrees with  end of life decisions: Yes      Cognitive Screening:   Provider or family/friend/caregiver concerned regarding cognition?: No    PREVENTIVE SCREENINGS      Cardiovascular Screening:    General: Screening Not Indicated and History Lipid Disorder      Diabetes Screening:     General: Screening Current      Colorectal Cancer Screening:     General: Screening Not Indicated      Breast Cancer Screening:     General: Screening Not Indicated      Cervical Cancer Screening:    General: Screening Not Indicated      Osteoporosis Screening:    General: Risks and Benefits Discussed and Patient Declines      Abdominal Aortic Aneurysm (AAA) Screening:        General: Screening Not Indicated      Lung Cancer Screening:     General: Screening Not Indicated      Hepatitis C Screening:    General: Screening Not Indicated    Screening, Brief Intervention, and Referral to Treatment (SBIRT)    Screening  Typical number of drinks in a day: 0  Typical number of drinks in a week: 0  Interpretation: Low risk drinking behavior.    AUDIT-C Screenin) How often did you have a drink containing alcohol in the past year? never  2) How many drinks did you have on a typical day when you were drinking in the past year? 0  3) How often did you have 6 or more drinks on one occasion in the past year? never    AUDIT-C Score: 0  Interpretation: Score 0-2 (female): Negative screen for alcohol misuse    Single Item Drug Screening:  How often have you used an illegal drug (including marijuana) or a prescription medication for non-medical reasons in the past year? never    Single Item Drug Screen Score: 0  Interpretation: Negative screen for possible drug use disorder    Brief Intervention  Alcohol & drug use screenings were reviewed. No concerns regarding substance use disorder identified.     Social Drivers of Health     Food Insecurity: No Food Insecurity (2024)    Hunger Vital Sign     Worried About Running Out of Food in the Last Year: Never  true     Ran Out of Food in the Last Year: Never true   Transportation Needs: No Transportation Needs (11/26/2024)    PRAPARE - Transportation     Lack of Transportation (Medical): No     Lack of Transportation (Non-Medical): No   Housing Stability: Unknown (11/26/2024)    Housing Stability Vital Sign     Unable to Pay for Housing in the Last Year: No     Homeless in the Last Year: No   Utilities: Not At Risk (11/26/2024)    [facility] Utilities     Threatened with loss of utilities: No     No results found.    Objective   /70   Pulse 72   Temp (!) 97.2 °F (36.2 °C)   Resp 18   Ht 5' (1.524 m)   Wt 55.8 kg (123 lb)   SpO2 98%   BMI 24.02 kg/m²     Physical Exam  Vitals and nursing note reviewed.   Constitutional:       General: She is not in acute distress.     Appearance: She is well-developed.   HENT:      Head: Normocephalic and atraumatic.      Right Ear: Tympanic membrane normal.      Left Ear: Tympanic membrane normal.   Cardiovascular:      Rate and Rhythm: Normal rate and regular rhythm.      Heart sounds: No murmur heard.  Pulmonary:      Effort: Pulmonary effort is normal. No respiratory distress.      Breath sounds: Normal breath sounds.   Musculoskeletal:         General: Tenderness (bilateral low back) present.      Cervical back: Neck supple.   Neurological:      Mental Status: She is alert.

## 2024-11-26 NOTE — PATIENT INSTRUCTIONS
Medicare Preventive Visit Patient Instructions  Thank you for completing your Welcome to Medicare Visit or Medicare Annual Wellness Visit today. Your next wellness visit will be due in one year (11/27/2025).  The screening/preventive services that you may require over the next 5-10 years are detailed below. Some tests may not apply to you based off risk factors and/or age. Screening tests ordered at today's visit but not completed yet may show as past due. Also, please note that scanned in results may not display below.  Preventive Screenings:  Service Recommendations Previous Testing/Comments   Colorectal Cancer Screening  * Colonoscopy    * Fecal Occult Blood Test (FOBT)/Fecal Immunochemical Test (FIT)  * Fecal DNA/Cologuard Test  * Flexible Sigmoidoscopy Age: 45-75 years old   Colonoscopy: every 10 years (may be performed more frequently if at higher risk)  OR  FOBT/FIT: every 1 year  OR  Cologuard: every 3 years  OR  Sigmoidoscopy: every 5 years  Screening may be recommended earlier than age 45 if at higher risk for colorectal cancer. Also, an individualized decision between you and your healthcare provider will decide whether screening between the ages of 76-85 would be appropriate. Colonoscopy: 04/26/2018  FOBT/FIT: Not on file  Cologuard: Not on file  Sigmoidoscopy: Not on file    Screening Not Indicated     Breast Cancer Screening Age: 40+ years old  Frequency: every 1-2 years  Not required if history of left and right mastectomy Mammogram: Not on file        Cervical Cancer Screening Between the ages of 21-29, pap smear recommended once every 3 years.   Between the ages of 30-65, can perform pap smear with HPV co-testing every 5 years.   Recommendations may differ for women with a history of total hysterectomy, cervical cancer, or abnormal pap smears in past. Pap Smear: Not on file    Screening Not Indicated   Hepatitis C Screening Once for adults born between 1945 and 1965  More frequently in patients at  high risk for Hepatitis C Hep C Antibody: Not on file        Diabetes Screening 1-2 times per year if you're at risk for diabetes or have pre-diabetes Fasting glucose: 88 mg/dL (11/11/2020)  A1C: 5.5 % (10/5/2020)  Screening Current   Cholesterol Screening Once every 5 years if you don't have a lipid disorder. May order more often based on risk factors. Lipid panel: 11/11/2020    Screening Not Indicated  History Lipid Disorder     Other Preventive Screenings Covered by Medicare:  Abdominal Aortic Aneurysm (AAA) Screening: covered once if your at risk. You're considered to be at risk if you have a family history of AAA.  Lung Cancer Screening: covers low dose CT scan once per year if you meet all of the following conditions: (1) Age 55-77; (2) No signs or symptoms of lung cancer; (3) Current smoker or have quit smoking within the last 15 years; (4) You have a tobacco smoking history of at least 20 pack years (packs per day multiplied by number of years you smoked); (5) You get a written order from a healthcare provider.  Glaucoma Screening: covered annually if you're considered high risk: (1) You have diabetes OR (2) Family history of glaucoma OR (3)  aged 50 and older OR (4)  American aged 65 and older  Osteoporosis Screening: covered every 2 years if you meet one of the following conditions: (1) You're estrogen deficient and at risk for osteoporosis based off medical history and other findings; (2) Have a vertebral abnormality; (3) On glucocorticoid therapy for more than 3 months; (4) Have primary hyperparathyroidism; (5) On osteoporosis medications and need to assess response to drug therapy.   Last bone density test (DXA Scan): Not on file.  HIV Screening: covered annually if you're between the age of 15-65. Also covered annually if you are younger than 15 and older than 65 with risk factors for HIV infection. For pregnant patients, it is covered up to 3 times per  pregnancy.    Immunizations:  Immunization Recommendations   Influenza Vaccine Annual influenza vaccination during flu season is recommended for all persons aged >= 6 months who do not have contraindications   Pneumococcal Vaccine   * Pneumococcal conjugate vaccine = PCV13 (Prevnar 13), PCV15 (Vaxneuvance), PCV20 (Prevnar 20)  * Pneumococcal polysaccharide vaccine = PPSV23 (Pneumovax) Adults 19-65 yo with certain risk factors or if 65+ yo  If never received any pneumonia vaccine: recommend Prevnar 20 (PCV20)  Give PCV20 if previously received 1 dose of PCV13 or PPSV23   Hepatitis B Vaccine 3 dose series if at intermediate or high risk (ex: diabetes, end stage renal disease, liver disease)   Respiratory syncytial virus (RSV) Vaccine - COVERED BY MEDICARE PART D  * RSVPreF3 (Arexvy) CDC recommends that adults 60 years of age and older may receive a single dose of RSV vaccine using shared clinical decision-making (SCDM)   Tetanus (Td) Vaccine - COST NOT COVERED BY MEDICARE PART B Following completion of primary series, a booster dose should be given every 10 years to maintain immunity against tetanus. Td may also be given as tetanus wound prophylaxis.   Tdap Vaccine - COST NOT COVERED BY MEDICARE PART B Recommended at least once for all adults. For pregnant patients, recommended with each pregnancy.   Shingles Vaccine (Shingrix) - COST NOT COVERED BY MEDICARE PART B  2 shot series recommended in those 19 years and older who have or will have weakened immune systems or those 50 years and older     Health Maintenance Due:  There are no preventive care reminders to display for this patient.  Immunizations Due:      Topic Date Due   • Pneumococcal Vaccine: 65+ Years (1 of 1 - PCV) Never done   • Influenza Vaccine (1) 09/01/2024   • COVID-19 Vaccine (4 - 2024-25 season) 09/01/2024     Advance Directives   What are advance directives?  Advance directives are legal documents that state your wishes and plans for medical care.  These plans are made ahead of time in case you lose your ability to make decisions for yourself. Advance directives can apply to any medical decision, such as the treatments you want, and if you want to donate organs.   What are the types of advance directives?  There are many types of advance directives, and each state has rules about how to use them. You may choose a combination of any of the following:  Living will:  This is a written record of the treatment you want. You can also choose which treatments you do not want, which to limit, and which to stop at a certain time. This includes surgery, medicine, IV fluid, and tube feedings.   Durable power of  for healthcare (DPAHC):  This is a written record that states who you want to make healthcare choices for you when you are unable to make them for yourself. This person, called a proxy, is usually a family member or a friend. You may choose more than 1 proxy.  Do not resuscitate (DNR) order:  A DNR order is used in case your heart stops beating or you stop breathing. It is a request not to have certain forms of treatment, such as CPR. A DNR order may be included in other types of advance directives.  Medical directive:  This covers the care that you want if you are in a coma, near death, or unable to make decisions for yourself. You can list the treatments you want for each condition. Treatment may include pain medicine, surgery, blood transfusions, dialysis, IV or tube feedings, and a ventilator (breathing machine).  Values history:  This document has questions about your views, beliefs, and how you feel and think about life. This information can help others choose the care that you would choose.  Why are advance directives important?  An advance directive helps you control your care. Although spoken wishes may be used, it is better to have your wishes written down. Spoken wishes can be misunderstood, or not followed. Treatments may be given even if you  do not want them. An advance directive may make it easier for your family to make difficult choices about your care.   Fall Prevention    Fall prevention  includes ways to make your home and other areas safer. It also includes ways you can move more carefully to prevent a fall. Health conditions that cause changes in your blood pressure, vision, or muscle strength and coordination may increase your risk for falls. Medicines may also increase your risk for falls if they make you dizzy, weak, or sleepy.   Fall prevention tips:   Stand or sit up slowly.    Use assistive devices as directed.    Wear shoes that fit well and have soles that .    Wear a personal alarm.    Stay active.    Manage your medical conditions.    Home Safety Tips:  Add items to prevent falls in the bathroom.    Keep paths clear.    Install bright lights in your home.    Keep items you use often on shelves within reach.    Paint or place reflective tape on the edges of your stairs.       © Copyright [employer] 2018 Information is for End User's use only and may not be sold, redistributed or otherwise used for commercial purposes. All illustrations and images included in CareNotes® are the copyrighted property of [employer] Inc[employer] or [employer]

## 2024-11-26 NOTE — ASSESSMENT & PLAN NOTE
Stable  Continue rosuvastatin 40 mg daily  Orders:    Comprehensive metabolic panel; Future    Lipid Panel with Direct LDL reflex; Future

## 2024-11-26 NOTE — ASSESSMENT & PLAN NOTE
Stable  Continue levothyroxine 70 mcg daily  Orders:    TSH, 3rd generation; Future    T4, free; Future

## 2024-11-26 NOTE — ASSESSMENT & PLAN NOTE
Well-controlled  Continue amlodipine 2.5 mg daily and metoprolol 25 mg daily  Orders:    CBC; Future    Comprehensive metabolic panel; Future    Lipid Panel with Direct LDL reflex; Future

## 2024-11-28 LAB
BACTERIA UR CULT: ABNORMAL

## 2024-11-29 ENCOUNTER — RESULTS FOLLOW-UP (OUTPATIENT)
Dept: FAMILY MEDICINE CLINIC | Facility: CLINIC | Age: 89
End: 2024-11-29

## 2024-12-09 ENCOUNTER — HOSPITAL ENCOUNTER (INPATIENT)
Facility: HOSPITAL | Age: 89
LOS: 3 days | Discharge: HOME | DRG: 243 | End: 2024-12-13
Attending: EMERGENCY MEDICINE | Admitting: INTERNAL MEDICINE
Payer: MEDICARE

## 2024-12-09 ENCOUNTER — APPOINTMENT (EMERGENCY)
Dept: RADIOLOGY | Facility: HOSPITAL | Age: 89
DRG: 243 | End: 2024-12-09
Payer: MEDICARE

## 2024-12-09 DIAGNOSIS — I10 ESSENTIAL HYPERTENSION: ICD-10-CM

## 2024-12-09 DIAGNOSIS — I49.5 SSS (SICK SINUS SYNDROME) (HCC): ICD-10-CM

## 2024-12-09 DIAGNOSIS — R55 SYNCOPE: Primary | ICD-10-CM

## 2024-12-09 DIAGNOSIS — D64.9 ANEMIA, UNSPECIFIED TYPE: ICD-10-CM

## 2024-12-09 LAB
2HR DELTA HS TROPONIN: 2 NG/L
4HR DELTA HS TROPONIN: 2 NG/L
ALBUMIN SERPL BCG-MCNC: 4 G/DL (ref 3.5–5)
ALP SERPL-CCNC: 67 U/L (ref 34–104)
ALT SERPL W P-5'-P-CCNC: 10 U/L (ref 7–52)
ANION GAP SERPL CALCULATED.3IONS-SCNC: 5 MMOL/L (ref 4–13)
AST SERPL W P-5'-P-CCNC: 18 U/L (ref 13–39)
ATRIAL RATE: 74 BPM
BASOPHILS # BLD AUTO: 0.05 THOUSANDS/ÂΜL (ref 0–0.1)
BASOPHILS NFR BLD AUTO: 1 % (ref 0–1)
BILIRUB SERPL-MCNC: 0.48 MG/DL (ref 0.2–1)
BUN SERPL-MCNC: 13 MG/DL (ref 5–25)
CALCIUM SERPL-MCNC: 9.2 MG/DL (ref 8.4–10.2)
CARDIAC TROPONIN I PNL SERPL HS: 7 NG/L (ref ?–50)
CARDIAC TROPONIN I PNL SERPL HS: 9 NG/L (ref ?–50)
CARDIAC TROPONIN I PNL SERPL HS: 9 NG/L (ref ?–50)
CHLORIDE SERPL-SCNC: 105 MMOL/L (ref 96–108)
CO2 SERPL-SCNC: 30 MMOL/L (ref 21–32)
CREAT SERPL-MCNC: 0.84 MG/DL (ref 0.6–1.3)
EOSINOPHIL # BLD AUTO: 0.09 THOUSAND/ÂΜL (ref 0–0.61)
EOSINOPHIL NFR BLD AUTO: 2 % (ref 0–6)
ERYTHROCYTE [DISTWIDTH] IN BLOOD BY AUTOMATED COUNT: 14.9 % (ref 11.6–15.1)
GFR SERPL CREATININE-BSD FRML MDRD: 61 ML/MIN/1.73SQ M
GLUCOSE SERPL-MCNC: 86 MG/DL (ref 65–140)
HCT VFR BLD AUTO: 39.9 % (ref 34.8–46.1)
HGB BLD-MCNC: 12.8 G/DL (ref 11.5–15.4)
IMM GRANULOCYTES # BLD AUTO: 0.02 THOUSAND/UL (ref 0–0.2)
IMM GRANULOCYTES NFR BLD AUTO: 0 % (ref 0–2)
LYMPHOCYTES # BLD AUTO: 1.18 THOUSANDS/ÂΜL (ref 0.6–4.47)
LYMPHOCYTES NFR BLD AUTO: 23 % (ref 14–44)
MAGNESIUM SERPL-MCNC: 1.9 MG/DL (ref 1.9–2.7)
MCH RBC QN AUTO: 38 PG (ref 26.8–34.3)
MCHC RBC AUTO-ENTMCNC: 32.1 G/DL (ref 31.4–37.4)
MCV RBC AUTO: 118 FL (ref 82–98)
MONOCYTES # BLD AUTO: 0.49 THOUSAND/ÂΜL (ref 0.17–1.22)
MONOCYTES NFR BLD AUTO: 10 % (ref 4–12)
NEUTROPHILS # BLD AUTO: 3.27 THOUSANDS/ÂΜL (ref 1.85–7.62)
NEUTS SEG NFR BLD AUTO: 64 % (ref 43–75)
NRBC BLD AUTO-RTO: 0 /100 WBCS
P AXIS: 49 DEGREES
PLATELET # BLD AUTO: 328 THOUSANDS/UL (ref 149–390)
PMV BLD AUTO: 11.2 FL (ref 8.9–12.7)
POTASSIUM SERPL-SCNC: 3.5 MMOL/L (ref 3.5–5.3)
PR INTERVAL: 146 MS
PROT SERPL-MCNC: 7.1 G/DL (ref 6.4–8.4)
QRS AXIS: 43 DEGREES
QRSD INTERVAL: 78 MS
QT INTERVAL: 394 MS
QTC INTERVAL: 438 MS
RBC # BLD AUTO: 3.37 MILLION/UL (ref 3.81–5.12)
SODIUM SERPL-SCNC: 140 MMOL/L (ref 135–147)
T WAVE AXIS: 70 DEGREES
TSH SERPL DL<=0.05 MIU/L-ACNC: 0.53 UIU/ML (ref 0.45–4.5)
VENTRICULAR RATE: 74 BPM
WBC # BLD AUTO: 5.1 THOUSAND/UL (ref 4.31–10.16)

## 2024-12-09 PROCEDURE — 93005 ELECTROCARDIOGRAM TRACING: CPT

## 2024-12-09 PROCEDURE — 99284 EMERGENCY DEPT VISIT MOD MDM: CPT

## 2024-12-09 PROCEDURE — 99205 OFFICE O/P NEW HI 60 MIN: CPT | Performed by: INTERNAL MEDICINE

## 2024-12-09 PROCEDURE — 84484 ASSAY OF TROPONIN QUANT: CPT | Performed by: EMERGENCY MEDICINE

## 2024-12-09 PROCEDURE — 99285 EMERGENCY DEPT VISIT HI MDM: CPT | Performed by: EMERGENCY MEDICINE

## 2024-12-09 PROCEDURE — 83735 ASSAY OF MAGNESIUM: CPT | Performed by: EMERGENCY MEDICINE

## 2024-12-09 PROCEDURE — 71045 X-RAY EXAM CHEST 1 VIEW: CPT

## 2024-12-09 PROCEDURE — 80053 COMPREHEN METABOLIC PANEL: CPT | Performed by: EMERGENCY MEDICINE

## 2024-12-09 PROCEDURE — 99223 1ST HOSP IP/OBS HIGH 75: CPT | Performed by: FAMILY MEDICINE

## 2024-12-09 PROCEDURE — 36415 COLL VENOUS BLD VENIPUNCTURE: CPT | Performed by: EMERGENCY MEDICINE

## 2024-12-09 PROCEDURE — 84443 ASSAY THYROID STIM HORMONE: CPT | Performed by: EMERGENCY MEDICINE

## 2024-12-09 PROCEDURE — 84484 ASSAY OF TROPONIN QUANT: CPT | Performed by: FAMILY MEDICINE

## 2024-12-09 PROCEDURE — 93010 ELECTROCARDIOGRAM REPORT: CPT | Performed by: INTERNAL MEDICINE

## 2024-12-09 PROCEDURE — 85025 COMPLETE CBC W/AUTO DIFF WBC: CPT | Performed by: EMERGENCY MEDICINE

## 2024-12-09 RX ORDER — HYDROXYUREA 500 MG/1
500 CAPSULE ORAL DAILY
Status: DISCONTINUED | OUTPATIENT
Start: 2024-12-09 | End: 2024-12-13 | Stop reason: HOSPADM

## 2024-12-09 RX ORDER — METOPROLOL SUCCINATE 25 MG/1
25 TABLET, EXTENDED RELEASE ORAL 2 TIMES DAILY
Status: DISCONTINUED | OUTPATIENT
Start: 2024-12-09 | End: 2024-12-09

## 2024-12-09 RX ORDER — POTASSIUM CHLORIDE 1500 MG/1
40 TABLET, EXTENDED RELEASE ORAL ONCE
Status: COMPLETED | OUTPATIENT
Start: 2024-12-09 | End: 2024-12-09

## 2024-12-09 RX ORDER — LEVOTHYROXINE SODIUM 75 UG/1
75 TABLET ORAL
Status: DISCONTINUED | OUTPATIENT
Start: 2024-12-10 | End: 2024-12-13 | Stop reason: HOSPADM

## 2024-12-09 RX ORDER — ATORVASTATIN CALCIUM 80 MG/1
80 TABLET, FILM COATED ORAL
Status: DISCONTINUED | OUTPATIENT
Start: 2024-12-09 | End: 2024-12-13 | Stop reason: HOSPADM

## 2024-12-09 RX ORDER — MECLIZINE HYDROCHLORIDE 25 MG/1
25 TABLET ORAL 3 TIMES DAILY PRN
Status: DISCONTINUED | OUTPATIENT
Start: 2024-12-09 | End: 2024-12-13 | Stop reason: HOSPADM

## 2024-12-09 RX ORDER — ASPIRIN 81 MG/1
81 TABLET, CHEWABLE ORAL DAILY
Status: DISCONTINUED | OUTPATIENT
Start: 2024-12-09 | End: 2024-12-13 | Stop reason: HOSPADM

## 2024-12-09 RX ORDER — ACETAMINOPHEN 325 MG/1
650 TABLET ORAL EVERY 6 HOURS PRN
Status: DISCONTINUED | OUTPATIENT
Start: 2024-12-09 | End: 2024-12-13 | Stop reason: HOSPADM

## 2024-12-09 RX ORDER — HEPARIN SODIUM 5000 [USP'U]/ML
5000 INJECTION, SOLUTION INTRAVENOUS; SUBCUTANEOUS EVERY 12 HOURS SCHEDULED
Status: DISCONTINUED | OUTPATIENT
Start: 2024-12-09 | End: 2024-12-13 | Stop reason: HOSPADM

## 2024-12-09 RX ORDER — AMLODIPINE BESYLATE 2.5 MG/1
2.5 TABLET ORAL ONCE
Status: COMPLETED | OUTPATIENT
Start: 2024-12-09 | End: 2024-12-09

## 2024-12-09 RX ORDER — AMLODIPINE BESYLATE 5 MG/1
5 TABLET ORAL DAILY
Status: DISCONTINUED | OUTPATIENT
Start: 2024-12-10 | End: 2024-12-13 | Stop reason: HOSPADM

## 2024-12-09 RX ORDER — PANTOPRAZOLE SODIUM 40 MG/1
40 TABLET, DELAYED RELEASE ORAL
Status: DISCONTINUED | OUTPATIENT
Start: 2024-12-10 | End: 2024-12-13 | Stop reason: HOSPADM

## 2024-12-09 RX ORDER — AMLODIPINE BESYLATE 2.5 MG/1
2.5 TABLET ORAL DAILY
Status: DISCONTINUED | OUTPATIENT
Start: 2024-12-09 | End: 2024-12-09

## 2024-12-09 RX ORDER — HYDRALAZINE HYDROCHLORIDE 20 MG/ML
5 INJECTION INTRAMUSCULAR; INTRAVENOUS EVERY 6 HOURS PRN
Status: DISCONTINUED | OUTPATIENT
Start: 2024-12-09 | End: 2024-12-13 | Stop reason: HOSPADM

## 2024-12-09 RX ADMIN — HEPARIN SODIUM 5000 UNITS: 5000 INJECTION, SOLUTION INTRAVENOUS; SUBCUTANEOUS at 21:26

## 2024-12-09 RX ADMIN — AMLODIPINE BESYLATE 2.5 MG: 2.5 TABLET ORAL at 15:56

## 2024-12-09 RX ADMIN — POTASSIUM CHLORIDE 40 MEQ: 1500 TABLET, EXTENDED RELEASE ORAL at 15:56

## 2024-12-09 RX ADMIN — ATORVASTATIN CALCIUM 80 MG: 80 TABLET, FILM COATED ORAL at 15:49

## 2024-12-09 RX ADMIN — HYDROXYUREA 500 MG: 500 CAPSULE ORAL at 15:56

## 2024-12-09 NOTE — ASSESSMENT & PLAN NOTE
Blood pressure elevated in the emergency room.  Norvasc is being increased by cardiology.  Toprol-XL is on hold as noted above.

## 2024-12-09 NOTE — ASSESSMENT & PLAN NOTE
blood pressure 189/79 in the emergency room  will increase Norvasc to 5 mg daily and give extra Norvasc 2.5 mg at this time  continue to monitor and adjust medications as needed  2D echocardiogram pending  attempt to obtain records from her primary cardiologist

## 2024-12-09 NOTE — Clinical Note
The PACER GENERATOR CAIO XT DR SARAI HAMMONDS - [id number] device was inserted. The leads were placed into the connector and visually verified to be in correct position. Injury current obtained.

## 2024-12-09 NOTE — CONSULTS
Consultation - Cardiology   Name: Blank Mensah 89 y.o. female I MRN: [id number]  Unit/Bed#: ED 09 I Date of Admission: 12/9/2024   Date of Service: 12/9/2024 I Hospital Day: 0   Inpatient consult to Cardiology  Consult performed by: ÁNGEL Sheridan  Consult ordered by: Bradford Guadalupe MD        Physician Requesting Evaluation: Bradford Guadalupe MD   Reason for Evaluation / Principal Problem: syncope    Assessment & Plan  Syncope  patient with history of TIA  patient states history of palpitations which at times will make her feel she is going to pass out  will hold Toprol-XL 25 mg at this time  continue monitor telemetry for arrhythmia  2D echocardiogram ordered by primary team  will obtain upgrade tilt table testing to evaluate for ortho stasis versus delayed postural hypotension  Essential hypertension  blood pressure 189/79 in the emergency room  will increase Norvasc to 5 mg daily and give extra Norvasc 2.5 mg at this time  continue to monitor and adjust medications as needed  2D echocardiogram pending  attempt to obtain records from her primary cardiologist  Paroxysmal SVT (supraventricular tachycardia) (HCC)  hold beta-blocker at this time and continue to monitor telemetry, may need extended ambulatory monitor versus loop recorder at discharge      History of Present Illness   Blank Mensah is a 89 y.o. female who presented to the emergency room after experiencing a syncopal episode. She is a fair historian. She states she was sitting in a chair and suddenly everything went black. This episode was unwitnessed. Patient did not lose any muscle tone nor fall striking her head. She just states she does not feel right and also complains of dizziness and lightheadedness. She does note at times she will have palpitations at home which make her feel lightheaded and also give her the sensation she is going to pass out, but she was unable to state whether that occurred at this time.    Patient has a  history of hypertensive urgency and difficult to control hypertension and in the emergency room blood pressures are extremely elevated. Telemetry reviewed in the emergency room and patient is predominantly sinus rhythm with periods of sinus arrhythmia, PACs and one episode of PAT lasting approximately six beats. Heart rates have ranged from 38 to 60s    Overall labs were within normal limits in the emergency room. Patient follows with Dr. Santana.    Review of Systems   Constitutional: Negative.  Negative for activity change and fatigue.   HENT: Negative.  Negative for congestion, facial swelling, sinus pressure and trouble swallowing.    Eyes: Negative.  Negative for photophobia and visual disturbance.   Respiratory: Negative.  Negative for chest tightness and shortness of breath.    Cardiovascular:  Positive for palpitations. Negative for chest pain.   Gastrointestinal:  Negative for abdominal distention, constipation, diarrhea, nausea and vomiting.   Endocrine: Negative.  Negative for polydipsia, polyphagia and polyuria.   Genitourinary: Negative.  Negative for difficulty urinating.   Musculoskeletal: Negative.    Skin: Negative.    Neurological:  Positive for syncope. Negative for dizziness, weakness and light-headedness.   Hematological: Negative.    Psychiatric/Behavioral: Negative.       I have reviewed the patient's PMH, PSH, Social History, Family History, Meds, and Allergies  Historical Information   Past Medical History:   Diagnosis Date    Anxiety     GERD (gastroesophageal reflux disease)     Hypertension     Hypothyroid     Vertigo      Past Surgical History:   Procedure Laterality Date    BREAST BIOPSY Left 1974    COLONOSCOPY  04/2018    HYSTERECTOMY       Social History     Tobacco Use    Smoking status: Former     Passive exposure: Never    Smokeless tobacco: Never   Vaping Use    Vaping status: Never Used   Substance and Sexual Activity    Alcohol use: Not Currently    Drug use: No    Sexual  activity: Not on file     E-Cigarette/Vaping    E-Cigarette Use Never User      E-Cigarette/Vaping Substances    Nicotine No     THC No     CBD No     Flavoring No     Other No     Unknown No      Family History   Problem Relation Age of Onset    Thrombosis Mother     Cancer Father     Diabetes Neg Hx      Social History     Tobacco Use    Smoking status: Former     Passive exposure: Never    Smokeless tobacco: Never   Vaping Use    Vaping status: Never Used   Substance and Sexual Activity    Alcohol use: Not Currently    Drug use: No    Sexual activity: Not on file       Current Facility-Administered Medications:     acetaminophen (TYLENOL) tablet 650 mg, Q6H PRN    amLODIPine (NORVASC) tablet 2.5 mg, Daily    aspirin chewable tablet 81 mg, Daily    atorvastatin (LIPITOR) tablet 80 mg, Daily With Dinner    heparin (porcine) subcutaneous injection 5,000 Units, Q12H ALINA    hydroxyurea (HYDREA) capsule 500 mg, Daily    levothyroxine tablet 75 mcg, Daily    meclizine (ANTIVERT) tablet 25 mg, TID PRN    [START ON 12/10/2024] pantoprazole (PROTONIX) EC tablet 40 mg, Early Morning    potassium chloride (Klor-Con M20) CR tablet 40 mEq, Once  Prior to Admission Medications   Prescriptions Last Dose Informant Patient Reported? Taking?   amLODIPine (NORVASC) 2.5 mg tablet   No No   Sig: Take 1 tablet (2.5 mg total) by mouth daily   aspirin (ECOTRIN LOW STRENGTH) 81 mg EC tablet  Self No No   Sig: Take 1 tablet (81 mg total) by mouth daily   hydroxyurea (HYDREA) 500 mg capsule   Yes No   Sig: daily   levothyroxine 75 mcg tablet   No No   Sig: Take 1 tablet (75 mcg total) by mouth daily   meclizine (ANTIVERT) 25 mg tablet  Self Yes No   Sig: Take 25 mg by mouth 3 (three) times a day as needed for dizziness   metoprolol succinate (TOPROL-XL) 25 mg 24 hr tablet   No No   Sig: Take 1 tablet (25 mg total) by mouth 2 (two) times a day   omeprazole (PriLOSEC) 20 mg delayed release capsule   No No   Sig: Take 1 capsule (20 mg total)  by mouth daily   rosuvastatin (CRESTOR) 40 MG tablet   No No   Sig: Take 1 tablet (40 mg total) by mouth every evening      Facility-Administered Medications: None     Iodinated contrast media and Dye fdc red [red dye - food allergy]    Objective :  Temp:  [97.5 °F (36.4 °C)] 97.5 °F (36.4 °C)  HR:  [61-77] 64  BP: (156-189)/(71-86) 171/86  Resp:  [18-22] 22  SpO2:  [97 %-99 %] 98 %  Orthostatic Blood Pressures      Flowsheet Row Most Recent Value   Blood Pressure 171/86 filed at 12/09/2024 1330          First Weight: Weight - Scale: 55.8 kg (123 lb) (12/09/24 1009)  Vitals:    12/09/24 1009   Weight: 55.8 kg (123 lb)       Physical Exam  Vitals and nursing note reviewed.   Constitutional:       General: She is not in acute distress.     Appearance: Normal appearance. She is normal weight.   HENT:      Right Ear: External ear normal.      Left Ear: External ear normal.      Nose: Nose normal.   Eyes:      General: No scleral icterus.        Right eye: No discharge.         Left eye: No discharge.   Cardiovascular:      Rate and Rhythm: Normal rate. Frequent Extrasystoles (PACs) are present.     Heart sounds: Murmur heard.   Pulmonary:      Effort: Pulmonary effort is normal. No respiratory distress.      Breath sounds: Normal breath sounds.   Abdominal:      General: Bowel sounds are normal. There is no distension.      Palpations: Abdomen is soft.   Musculoskeletal:      Right lower leg: No edema.      Left lower leg: No edema.   Skin:     General: Skin is warm and dry.   Neurological:      General: No focal deficit present.      Mental Status: She is alert and oriented to person, place, and time. Mental status is at baseline.   Psychiatric:         Mood and Affect: Mood normal.           Lab Results: I have reviewed the following results:  Results from last 7 days   Lab Units 12/09/24  1150   WBC Thousand/uL 5.10   HEMOGLOBIN g/dL 12.8   HEMATOCRIT % 39.9   PLATELETS Thousands/uL 328     Results from last 7 days    Lab Units 12/09/24  1150   POTASSIUM mmol/L 3.5   CHLORIDE mmol/L 105   CO2 mmol/L 30   BUN mg/dL 13   CREATININE mg/dL 0.84   CALCIUM mg/dL 9.2         Lab Results   Component Value Date    HGBA1C 5.5 10/05/2020     Lab Results   Component Value Date    TROPONINI <0.02 10/04/2020     12 lead EKG demonstrated sinus rhythm, telemetry reviewed in the emergency room patient experiencing sinus rhythm, sinus bradycardia with frequent PACs, sinus arrhythmia and also one episode of PAT lasting approximately six beats. Heart rates ranging from 38 to 60    VTE Prophylaxis: VTE covered by:  heparin (porcine), Subcutaneous    and Sequential compression device (Venodyne)     Lorraine NEAL  Cardiology

## 2024-12-09 NOTE — ASSESSMENT & PLAN NOTE
hold beta-blocker at this time and continue to monitor telemetry, may need extended ambulatory monitor versus loop recorder at discharge

## 2024-12-09 NOTE — ASSESSMENT & PLAN NOTE
Patient was seen and examined in the emergency department and will be placed into observation status for further evaluation and management.  Patient presenting with palpitations and a syncopal episode at home while sitting in a chair.  Will continue to monitor on telemetry.  2D echocardiogram has been ordered for further evaluation.  Cardiology consult has been placed.  Will hold Toprol-XL for now per recommendations from cardiology. Patient with a history of SVT.

## 2024-12-09 NOTE — ASSESSMENT & PLAN NOTE
patient with history of TIA  patient states history of palpitations which at times will make her feel she is going to pass out  will hold Toprol-XL 25 mg at this time  continue monitor telemetry for arrhythmia  2D echocardiogram ordered by primary team  will obtain upgrade tilt table testing to evaluate for ortho stasis versus delayed postural hypotension

## 2024-12-09 NOTE — H&P
"H&P - Hospitalist   Name: Blank Mensah 89 y.o. female I MRN: [id number]  Unit/Bed#: ED 09 I Date of Admission: 12/9/2024   Date of Service: 12/9/2024 I Hospital Day: 0     Assessment & Plan  Syncope  Patient was seen and examined in the emergency department and will be placed into observation status for further evaluation and management.  Patient presenting with palpitations and a syncopal episode at home while sitting in a chair.  Will continue to monitor on telemetry.  2D echocardiogram has been ordered for further evaluation.  Cardiology consult has been placed.  Will hold Toprol-XL for now per recommendations from cardiology. Patient with a history of SVT.  Essential hypertension  Blood pressure elevated in the emergency room.  Norvasc is being increased by cardiology.  Toprol-XL is on hold as noted above.  Paroxysmal SVT (supraventricular tachycardia) (HCC)  Continue to monitor on telemetry.  Follow-up with recommendations from cardiology.  Hyperlipidemia  Continue statin therapy.  Hypothyroidism  TSH performed in the emergency room is 0.528.  Continue levothyroxine 75 mcg daily.      VTE Pharmacologic Prophylaxis:   Heparin prophylaxis.  Code Status: Level 1 - Full Code   Discussion with family: Discussed with family at bedside.    Anticipated Length of Stay: Patient will be admitted on an observation basis with an anticipated length of stay of less than 2 midnights secondary to above.    History of Present Illness   Chief Complaint: Palpitations/syncope.    Blank Mensah is a 89 y.o. female presenting to the emergency department with complaints of palpitations and syncope.  Patient reports that she was sitting in a chair at home when she \"saw stars\" and started to feel extremely dizzy.  Patient felt like she was having palpitations and suddenly \"passed out.\"  Patient quickly regained consciousness and had no confusion after the episode.  Patient reports that she experiences palpitations frequently at " home.  Telemetry in the emergency room showed periods of sinus arrhythmia, PACs and an episode of PAT that lasted approximately 6 beats.  Patient currently denies any chest pain, shortness of breath, lightheadedness, fevers, chills, change in urinary habits, change in bowel habits, recent travel or any known sick contacts.    14 point of review of systems obtained and reviewed and is negative except as outlined above in the HPI.     Historical Information   Past Medical History:   Diagnosis Date    Anxiety     GERD (gastroesophageal reflux disease)     Hypertension     Hypothyroid     Vertigo      Past Surgical History:   Procedure Laterality Date    BREAST BIOPSY Left 1974    COLONOSCOPY  04/2018    HYSTERECTOMY       Social History     Tobacco Use    Smoking status: Former     Passive exposure: Never    Smokeless tobacco: Never   Vaping Use    Vaping status: Never Used   Substance and Sexual Activity    Alcohol use: Not Currently    Drug use: No    Sexual activity: Not on file     E-Cigarette/Vaping    E-Cigarette Use Never User      E-Cigarette/Vaping Substances    Nicotine No     THC No     CBD No     Flavoring No     Other No     Unknown No      Family History   Problem Relation Age of Onset    Thrombosis Mother     Cancer Father     Diabetes Neg Hx      Social History:  Marital Status: /Civil Union     Meds/Allergies   Prior to Admission medications    Medication Sig Start Date End Date Taking? Authorizing Provider   amLODIPine (NORVASC) 2.5 mg tablet Take 1 tablet (2.5 mg total) by mouth daily 8/27/24   Andreia Barba DO   aspirin (ECOTRIN LOW STRENGTH) 81 mg EC tablet Take 1 tablet (81 mg total) by mouth daily 10/6/20 11/26/24  ÁNGEL Barahona   hydroxyurea (HYDREA) 500 mg capsule daily 8/20/24   Historical Provider, MD   levothyroxine 75 mcg tablet Take 1 tablet (75 mcg total) by mouth daily 9/16/24   Andreia Barba DO   meclizine (ANTIVERT) 25 mg tablet Take 25 mg by mouth 3 (three) times a day as  needed for dizziness    Historical Provider, MD   metoprolol succinate (TOPROL-XL) 25 mg 24 hr tablet Take 1 tablet (25 mg total) by mouth 2 (two) times a day 8/27/24   Andreia Barba DO   omeprazole (PriLOSEC) 20 mg delayed release capsule Take 1 capsule (20 mg total) by mouth daily 8/27/24   Andreia Barba DO   rosuvastatin (CRESTOR) 40 MG tablet Take 1 tablet (40 mg total) by mouth every evening 8/27/24   Andreia Barba DO     Allergies   Allergen Reactions    Iodinated Contrast Media     Dye Fdc Red [Red Dye - Food Allergy] Palpitations     Objective :  Temp:  [97.5 °F (36.4 °C)] 97.5 °F (36.4 °C)  HR:  [61-77] 64  BP: (156-189)/(71-86) 171/86  Resp:  [18-22] 22  SpO2:  [97 %-99 %] 98 %    Physical Exam  HENT:      Head: Normocephalic.      Nose: Nose normal.      Mouth/Throat:      Mouth: Mucous membranes are moist.   Eyes:      Extraocular Movements: Extraocular movements intact.   Cardiovascular:      Rate and Rhythm: Normal rate.   Pulmonary:      Effort: Pulmonary effort is normal. No respiratory distress.   Abdominal:      Palpations: Abdomen is soft.      Tenderness: There is no abdominal tenderness.   Skin:     General: Skin is warm.   Neurological:      Mental Status: She is alert and oriented to person, place, and time.   Psychiatric:         Mood and Affect: Mood normal.         Behavior: Behavior normal.          Lab Results: I have reviewed the following results:  Results from last 7 days   Lab Units 12/09/24  1150   WBC Thousand/uL 5.10   HEMOGLOBIN g/dL 12.8   HEMATOCRIT % 39.9   PLATELETS Thousands/uL 328   SEGS PCT % 64   LYMPHO PCT % 23   MONO PCT % 10   EOS PCT % 2     Results from last 7 days   Lab Units 12/09/24  1150   SODIUM mmol/L 140   POTASSIUM mmol/L 3.5   CHLORIDE mmol/L 105   CO2 mmol/L 30   BUN mg/dL 13   CREATININE mg/dL 0.84   ANION GAP mmol/L 5   CALCIUM mg/dL 9.2   ALBUMIN g/dL 4.0   TOTAL BILIRUBIN mg/dL 0.48   ALK PHOS U/L 67   ALT U/L 10   AST U/L 18   GLUCOSE RANDOM mg/dL 86      Lab Results   Component Value Date    HGBA1C 5.5 10/05/2020     Administrative Statements   I have spent a total time of 75 minutes in caring for this patient on the day of the visit/encounter including Diagnostic results, Prognosis, Risks and benefits of tx options, Instructions for management, Patient and family education, Importance of tx compliance, Risk factor reductions, Impressions, Counseling / Coordination of care, Documenting in the medical record, Reviewing / ordering tests, medicine, procedures  , Obtaining or reviewing history  , and Communicating with other healthcare professionals .    ** Please Note: This note has been constructed using a voice recognition system. **

## 2024-12-09 NOTE — ED PROVIDER NOTES
Time reflects when diagnosis was documented in both MDM as applicable and the Disposition within this note       Time User Action Codes Description Comment    12/9/2024  2:02 PM Johnathan Walker Add [R55] Syncope           ED Disposition       ED Disposition   Admit    Condition   Stable    Date/Time   Mon Dec 9, 2024  2:02 PM    Comment                  Assessment & Plan       Medical Decision Making  It appears patient had a syncopal episode.  Seizure would be less likely.  There was no hypovolemia.  No significant anemia.  Patient did have some sinus pauses on monitoring.  Otherwise no arrhythmia.  Doubt acute myocardial infarction.  There is nothing on physical examination to suggest a stroke.  Her dizziness was not vertigo.  Because of the sinus pauses, consulted with hospitalist for admission for further monitoring and cardiology consult.    Amount and/or Complexity of Data Reviewed  Labs: ordered. Decision-making details documented in ED Course.  Radiology: ordered and independent interpretation performed. Decision-making details documented in ED Course.  ECG/medicine tests: ordered and independent interpretation performed. Decision-making details documented in ED Course.    Risk  Decision regarding hospitalization.             Medications - No data to display    ED Risk Strat Scores                           SBIRT 20yo+      Flowsheet Row Most Recent Value   Initial Alcohol Screen: US AUDIT-C     1. How often do you have a drink containing alcohol? 0 Filed at: 12/09/2024 1009   2. How many drinks containing alcohol do you have on a typical day you are drinking?  0 Filed at: 12/09/2024 1009   3a. Male UNDER 65: How often do you have five or more drinks on one occasion? 0 Filed at: 12/09/2024 1009   3b. FEMALE Any Age, or MALE 65+: How often do you have 4 or more drinks on one occassion? 0 Filed at: 12/09/2024 1009   Audit-C Score 0 Filed at: 12/09/2024 1009   LOVE: How many times in the past year have you...   "  Used an illegal drug or used a prescription medication for non-medical reasons? Never Filed at: 12/09/2024 1009                            History of Present Illness       Chief Complaint   Patient presents with    Dizziness     States she was sitting in a chair when everything went black and she thinks she may have passed out about an hour ago, unwitnessed. Does not think she hit head but does not feel \"right\"       Past Medical History:   Diagnosis Date    Anxiety     GERD (gastroesophageal reflux disease)     Hypertension     Hypothyroid     Vertigo       Past Surgical History:   Procedure Laterality Date    BREAST BIOPSY Left 1974    COLONOSCOPY  04/2018    HYSTERECTOMY        Family History   Problem Relation Age of Onset    Thrombosis Mother     Cancer Father     Diabetes Neg Hx       Social History     Tobacco Use    Smoking status: Former     Passive exposure: Never    Smokeless tobacco: Never   Vaping Use    Vaping status: Never Used   Substance Use Topics    Alcohol use: Not Currently    Drug use: No      E-Cigarette/Vaping    E-Cigarette Use Never User       E-Cigarette/Vaping Substances    Nicotine No     THC No     CBD No     Flavoring No     Other No     Unknown No       I have reviewed and agree with the history as documented.     Patient is a 89-year-old female.  She is on metoprolol 25 mg p.o. twice daily.  She has a history of hypothyroidism, hypertension, anxiety, SVT, TIA, bilateral carotid artery stenosis, hyperlipidemia, and vertigo.  She was at rest when she \"saw stars\".  She felt dizzy.  Not vertigo.  She might of blacked out.  Denies injury.  She was seated at the time.  Currently she feels better but \"still does not feel right\".  No chest pain or shortness of breath.  No speech problems.  No lateralizing motor or sensory complaints.  Prior to me seeing patient, nursing reported seeing pauses on her monitor strip.        Review of Systems   Constitutional:  Negative for chills and fever. "   HENT:  Negative for rhinorrhea and sore throat.    Eyes:  Negative for pain, redness and visual disturbance.   Respiratory:  Negative for cough and shortness of breath.    Cardiovascular:  Negative for chest pain and leg swelling.   Gastrointestinal:  Negative for abdominal pain, diarrhea and vomiting.   Endocrine: Negative for polydipsia and polyuria.   Genitourinary:  Negative for dysuria, frequency, hematuria, vaginal bleeding and vaginal discharge.   Musculoskeletal:  Negative for back pain and neck pain.   Skin:  Negative for rash and wound.   Allergic/Immunologic: Negative for immunocompromised state.   Neurological:  Positive for dizziness. Negative for weakness, numbness and headaches.   Hematological:  Does not bruise/bleed easily.   Psychiatric/Behavioral:  Negative for hallucinations and suicidal ideas.    All other systems reviewed and are negative.          Objective       ED Triage Vitals   Temperature Pulse Blood Pressure Respirations SpO2 Patient Position - Orthostatic VS   12/09/24 1009 12/09/24 1009 12/09/24 1012 12/09/24 1009 12/09/24 1030 --   97.5 °F (36.4 °C) 77 (!) 182/77 18 99 %       Temp src Heart Rate Source BP Location FiO2 (%) Pain Score    -- -- -- -- 12/09/24 1009        No Pain      Vitals      Date and Time Temp Pulse SpO2 Resp BP Pain Score FACES Pain Rating User   12/09/24 1330 -- 64 98 % 22 171/86 -- -- SS   12/09/24 1300 -- 61 97 % 22 156/71 -- -- SS   12/09/24 1100 -- 70 97 % 20 188/75 -- -- SS   12/09/24 1030 -- 75 99 % 20 189/79 -- -- SS   12/09/24 1012 -- -- -- -- 182/77 -- -- ERIC   12/09/24 1009 97.5 °F (36.4 °C) 77 -- 18 -- No Pain -- ERIC            Physical Exam  Vitals reviewed.   Constitutional:       General: She is not in acute distress.  HENT:      Head: Normocephalic and atraumatic.      Nose: Nose normal.      Mouth/Throat:      Mouth: Mucous membranes are moist.   Eyes:      General:         Right eye: No discharge.         Left eye: No discharge.      Extraocular  Movements: Extraocular movements intact.      Conjunctiva/sclera: Conjunctivae normal.      Pupils: Pupils are equal, round, and reactive to light.      Comments: VFF   Cardiovascular:      Rate and Rhythm: Normal rate and regular rhythm.      Pulses: Normal pulses.      Heart sounds: Normal heart sounds. No murmur heard.     No friction rub. No gallop.   Pulmonary:      Effort: Pulmonary effort is normal. No respiratory distress.      Breath sounds: Normal breath sounds. No stridor. No wheezing, rhonchi or rales.   Abdominal:      General: Bowel sounds are normal. There is no distension.      Palpations: Abdomen is soft.      Tenderness: There is no abdominal tenderness. There is no right CVA tenderness, left CVA tenderness, guarding or rebound.   Musculoskeletal:         General: No swelling, tenderness, deformity or signs of injury. Normal range of motion.      Cervical back: Normal range of motion and neck supple. No rigidity.      Right lower leg: No edema.      Left lower leg: No edema.      Comments: No calf tenderness or unilateral leg swelling.   Skin:     General: Skin is warm and dry.      Coloration: Skin is not jaundiced.      Findings: No rash.   Neurological:      General: No focal deficit present.      Mental Status: She is alert and oriented to person, place, and time.      Sensory: No sensory deficit.      Motor: Motor function is intact.   Psychiatric:         Mood and Affect: Mood normal.         Behavior: Behavior normal.         Results Reviewed       Procedure Component Value Units Date/Time    HS Troponin 0hr (reflex protocol) [[id number]]  (Normal) Collected: 12/09/24 1248    Lab Status: Final result Specimen: Blood from Arm, Right Updated: 12/09/24 1319     hs TnI 0hr 7 ng/L     HS Troponin I 2hr [[id number]]     Lab Status: No result Specimen: Blood     TSH, 3rd generation with Free T4 reflex [[id number]]  (Normal) Collected: 12/09/24 1150    Lab Status: Final result Specimen: Blood from Arm,  Right Updated: 12/09/24 1237     TSH 3RD GENERATON 0.528 uIU/mL     Comprehensive metabolic panel [[id number]] Collected: 12/09/24 1150    Lab Status: Final result Specimen: Blood from Arm, Right Updated: 12/09/24 1223     Sodium 140 mmol/L      Potassium 3.5 mmol/L      Chloride 105 mmol/L      CO2 30 mmol/L      ANION GAP 5 mmol/L      BUN 13 mg/dL      Creatinine 0.84 mg/dL      Glucose 86 mg/dL      Calcium 9.2 mg/dL      AST 18 U/L      ALT 10 U/L      Alkaline Phosphatase 67 U/L      Total Protein 7.1 g/dL      Albumin 4.0 g/dL      Total Bilirubin 0.48 mg/dL      eGFR 61 ml/min/1.73sq m     Narrative:      National Kidney Disease Foundation guidelines for Chronic Kidney Disease (CKD):     Stage 1 with normal or high GFR (GFR > 90 mL/min/1.73 square meters)    Stage 2 Mild CKD (GFR = 60-89 mL/min/1.73 square meters)    Stage 3A Moderate CKD (GFR = 45-59 mL/min/1.73 square meters)    Stage 3B Moderate CKD (GFR = 30-44 mL/min/1.73 square meters)    Stage 4 Severe CKD (GFR = 15-29 mL/min/1.73 square meters)    Stage 5 End Stage CKD (GFR <15 mL/min/1.73 square meters)  Note: GFR calculation is accurate only with a steady state creatinine    Magnesium [[id number]]  (Normal) Collected: 12/09/24 1150    Lab Status: Final result Specimen: Blood from Arm, Right Updated: 12/09/24 1223     Magnesium 1.9 mg/dL     CBC and differential [[id number]]  (Abnormal) Collected: 12/09/24 1150    Lab Status: Final result Specimen: Blood from Arm, Right Updated: 12/09/24 1217     WBC 5.10 Thousand/uL      RBC 3.37 Million/uL      Hemoglobin 12.8 g/dL      Hematocrit 39.9 %       fL      MCH 38.0 pg      MCHC 32.1 g/dL      RDW 14.9 %      MPV 11.2 fL      Platelets 328 Thousands/uL      nRBC 0 /100 WBCs      Segmented % 64 %      Immature Grans % 0 %      Lymphocytes % 23 %      Monocytes % 10 %      Eosinophils Relative 2 %      Basophils Relative 1 %      Absolute Neutrophils 3.27 Thousands/µL      Absolute Immature Grans 0.02  Thousand/uL      Absolute Lymphocytes 1.18 Thousands/µL      Absolute Monocytes 0.49 Thousand/µL      Eosinophils Absolute 0.09 Thousand/µL      Basophils Absolute 0.05 Thousands/µL             XR chest 1 view portable   ED Interpretation by Johnathan Walker MD (12/09 1234)   No infiltrates or CHF.          ECG 12 Lead Documentation Only    Date/Time: 12/9/2024 11:01 AM    Performed by: Johnathan Walker MD  Authorized by: Johnathan Walker MD    ECG reviewed by me, the ED Provider: yes    Patient location:  ED  Comments:      Sinus arrhythmia.  Possible anterior infarct, age undetermined.  Abnormal EKG.  ECG 12 Lead Documentation Only    Date/Time: 12/9/2024 11:02 AM    Performed by: Johnathan Walker MD  Authorized by: Johnathan Walker MD    ECG reviewed by me, the ED Provider: yes    Patient location:  ED  Comments:      Sinus arrhythmia.  Septal infarct, age undetermined.  Abnormal EKG.      ED Medication and Procedure Management   Prior to Admission Medications   Prescriptions Last Dose Informant Patient Reported? Taking?   amLODIPine (NORVASC) 2.5 mg tablet   No No   Sig: Take 1 tablet (2.5 mg total) by mouth daily   aspirin (ECOTRIN LOW STRENGTH) 81 mg EC tablet  Self No No   Sig: Take 1 tablet (81 mg total) by mouth daily   hydroxyurea (HYDREA) 500 mg capsule   Yes No   Sig: daily   levothyroxine 75 mcg tablet   No No   Sig: Take 1 tablet (75 mcg total) by mouth daily   meclizine (ANTIVERT) 25 mg tablet  Self Yes No   Sig: Take 25 mg by mouth 3 (three) times a day as needed for dizziness   metoprolol succinate (TOPROL-XL) 25 mg 24 hr tablet   No No   Sig: Take 1 tablet (25 mg total) by mouth 2 (two) times a day   omeprazole (PriLOSEC) 20 mg delayed release capsule   No No   Sig: Take 1 capsule (20 mg total) by mouth daily   rosuvastatin (CRESTOR) 40 MG tablet   No No   Sig: Take 1 tablet (40 mg total) by mouth every evening      Facility-Administered Medications: None     Patient's Medications   Discharge  Prescriptions    No medications on file     No discharge procedures on file.  ED SEPSIS DOCUMENTATION   Time reflects when diagnosis was documented in both MDM as applicable and the Disposition within this note       Time User Action Codes Description Comment    12/9/2024  2:02 PM Johnathan Walker Add [R55] Syncope                  Johnathan Walker MD  12/09/24 3273

## 2024-12-10 ENCOUNTER — APPOINTMENT (OUTPATIENT)
Dept: NON INVASIVE DIAGNOSTICS | Facility: HOSPITAL | Age: 89
DRG: 243 | End: 2024-12-10
Attending: FAMILY MEDICINE
Payer: MEDICARE

## 2024-12-10 ENCOUNTER — PREP FOR PROCEDURE (OUTPATIENT)
Dept: CARDIOLOGY CLINIC | Facility: CLINIC | Age: 89
End: 2024-12-10

## 2024-12-10 ENCOUNTER — TELEPHONE (OUTPATIENT)
Age: 89
End: 2024-12-10

## 2024-12-10 DIAGNOSIS — I49.5 SSS (SICK SINUS SYNDROME) (HCC): Primary | ICD-10-CM

## 2024-12-10 LAB
ALBUMIN SERPL BCG-MCNC: 3.5 G/DL (ref 3.5–5)
ALP SERPL-CCNC: 58 U/L (ref 34–104)
ALT SERPL W P-5'-P-CCNC: 8 U/L (ref 7–52)
ANION GAP SERPL CALCULATED.3IONS-SCNC: 5 MMOL/L (ref 4–13)
AORTIC ROOT: 2.9 CM
AST SERPL W P-5'-P-CCNC: 16 U/L (ref 13–39)
BASOPHILS # BLD AUTO: 0.04 THOUSANDS/ÂΜL (ref 0–0.1)
BASOPHILS NFR BLD AUTO: 1 % (ref 0–1)
BILIRUB SERPL-MCNC: 0.55 MG/DL (ref 0.2–1)
BSA FOR ECHO PROCEDURE: 1.5 M2
BUN SERPL-MCNC: 17 MG/DL (ref 5–25)
CALCIUM SERPL-MCNC: 9 MG/DL (ref 8.4–10.2)
CARDIAC TROPONIN I PNL SERPL HS: 6 NG/L (ref 8–18)
CHLORIDE SERPL-SCNC: 107 MMOL/L (ref 96–108)
CO2 SERPL-SCNC: 28 MMOL/L (ref 21–32)
CREAT SERPL-MCNC: 0.78 MG/DL (ref 0.6–1.3)
E WAVE DECELERATION TIME: 175 MS
E/A RATIO: 1.59
EOSINOPHIL # BLD AUTO: 0.09 THOUSAND/ÂΜL (ref 0–0.61)
EOSINOPHIL NFR BLD AUTO: 2 % (ref 0–6)
ERYTHROCYTE [DISTWIDTH] IN BLOOD BY AUTOMATED COUNT: 14.6 % (ref 11.6–15.1)
FRACTIONAL SHORTENING: 26 (ref 28–44)
GFR SERPL CREATININE-BSD FRML MDRD: 67 ML/MIN/1.73SQ M
GLUCOSE SERPL-MCNC: 85 MG/DL (ref 65–140)
HCT VFR BLD AUTO: 34.8 % (ref 34.8–46.1)
HGB BLD-MCNC: 11.1 G/DL (ref 11.5–15.4)
IMM GRANULOCYTES # BLD AUTO: 0.02 THOUSAND/UL (ref 0–0.2)
IMM GRANULOCYTES NFR BLD AUTO: 0 % (ref 0–2)
INTERVENTRICULAR SEPTUM IN DIASTOLE (PARASTERNAL SHORT AXIS VIEW): 1 CM
INTERVENTRICULAR SEPTUM: 1 CM (ref 0.6–1.1)
LAAS-AP2: 16.3 CM2
LAAS-AP4: 13.8 CM2
LEFT ATRIUM SIZE: 3.6 CM
LEFT ATRIUM VOLUME (MOD BIPLANE): 34 ML
LEFT ATRIUM VOLUME INDEX (MOD BIPLANE): 22.7 ML/M2
LEFT INTERNAL DIMENSION IN SYSTOLE: 3.1 CM (ref 2.1–4)
LEFT VENTRICULAR INTERNAL DIMENSION IN DIASTOLE: 4.2 CM (ref 3.5–6)
LEFT VENTRICULAR POSTERIOR WALL IN END DIASTOLE: 0.9 CM
LEFT VENTRICULAR STROKE VOLUME: 42 ML
LVSV (TEICH): 42 ML
LYMPHOCYTES # BLD AUTO: 1.67 THOUSANDS/ÂΜL (ref 0.6–4.47)
LYMPHOCYTES NFR BLD AUTO: 27 % (ref 14–44)
MAGNESIUM SERPL-MCNC: 1.9 MG/DL (ref 1.9–2.7)
MCH RBC QN AUTO: 37.6 PG (ref 26.8–34.3)
MCHC RBC AUTO-ENTMCNC: 31.9 G/DL (ref 31.4–37.4)
MCV RBC AUTO: 118 FL (ref 82–98)
MONOCYTES # BLD AUTO: 0.66 THOUSAND/ÂΜL (ref 0.17–1.22)
MONOCYTES NFR BLD AUTO: 11 % (ref 4–12)
MV E'TISSUE VEL-LAT: 9 CM/S
MV E'TISSUE VEL-SEP: 7 CM/S
MV PEAK A VEL: 0.54 M/S
MV PEAK E VEL: 86 CM/S
MV STENOSIS PRESSURE HALF TIME: 51 MS
MV VALVE AREA P 1/2 METHOD: 4.31
NEUTROPHILS # BLD AUTO: 3.72 THOUSANDS/ÂΜL (ref 1.85–7.62)
NEUTS SEG NFR BLD AUTO: 59 % (ref 43–75)
NRBC BLD AUTO-RTO: 0 /100 WBCS
PHOSPHATE SERPL-MCNC: 3.1 MG/DL (ref 2.3–4.1)
PLATELET # BLD AUTO: 267 THOUSANDS/UL (ref 149–390)
PMV BLD AUTO: 10.8 FL (ref 8.9–12.7)
POTASSIUM SERPL-SCNC: 4 MMOL/L (ref 3.5–5.3)
PROT SERPL-MCNC: 6.3 G/DL (ref 6.4–8.4)
RBC # BLD AUTO: 2.95 MILLION/UL (ref 3.81–5.12)
RIGHT VENTRICLE ID DIMENSION: 2.9 CM
SL CV LEFT ATRIUM LENGTH A2C: 5.6 CM
SL CV LV EF: 60
SL CV PED ECHO LEFT VENTRICLE DIASTOLIC VOLUME (MOD BIPLANE) 2D: 79 ML
SL CV PED ECHO LEFT VENTRICLE SYSTOLIC VOLUME (MOD BIPLANE) 2D: 37 ML
SODIUM SERPL-SCNC: 140 MMOL/L (ref 135–147)
TR MAX PG: 29 MMHG
TR PEAK VELOCITY: 2.7 M/S
TRICUSPID ANNULAR PLANE SYSTOLIC EXCURSION: 1.6 CM
TRICUSPID VALVE PEAK REGURGITATION VELOCITY: 2.7 M/S
WBC # BLD AUTO: 6.2 THOUSAND/UL (ref 4.31–10.16)

## 2024-12-10 PROCEDURE — 99232 SBSQ HOSP IP/OBS MODERATE 35: CPT | Performed by: INTERNAL MEDICINE

## 2024-12-10 PROCEDURE — 84100 ASSAY OF PHOSPHORUS: CPT | Performed by: FAMILY MEDICINE

## 2024-12-10 PROCEDURE — 83735 ASSAY OF MAGNESIUM: CPT | Performed by: FAMILY MEDICINE

## 2024-12-10 PROCEDURE — 84484 ASSAY OF TROPONIN QUANT: CPT | Performed by: FAMILY MEDICINE

## 2024-12-10 PROCEDURE — 93306 TTE W/DOPPLER COMPLETE: CPT

## 2024-12-10 PROCEDURE — 85025 COMPLETE CBC W/AUTO DIFF WBC: CPT | Performed by: FAMILY MEDICINE

## 2024-12-10 PROCEDURE — 80053 COMPREHEN METABOLIC PANEL: CPT | Performed by: FAMILY MEDICINE

## 2024-12-10 PROCEDURE — 93306 TTE W/DOPPLER COMPLETE: CPT | Performed by: INTERNAL MEDICINE

## 2024-12-10 RX ORDER — CHLORHEXIDINE GLUCONATE ORAL RINSE 1.2 MG/ML
15 SOLUTION DENTAL ONCE
Status: CANCELLED | OUTPATIENT
Start: 2024-12-10 | End: 2024-12-10

## 2024-12-10 RX ADMIN — ATORVASTATIN CALCIUM 80 MG: 80 TABLET, FILM COATED ORAL at 17:03

## 2024-12-10 RX ADMIN — ASPIRIN 81 MG CHEWABLE TABLET 81 MG: 81 TABLET CHEWABLE at 09:00

## 2024-12-10 RX ADMIN — PANTOPRAZOLE SODIUM 40 MG: 40 TABLET, DELAYED RELEASE ORAL at 05:20

## 2024-12-10 RX ADMIN — HEPARIN SODIUM 5000 UNITS: 5000 INJECTION, SOLUTION INTRAVENOUS; SUBCUTANEOUS at 09:00

## 2024-12-10 RX ADMIN — LEVOTHYROXINE SODIUM 75 MCG: 75 TABLET ORAL at 05:20

## 2024-12-10 RX ADMIN — HEPARIN SODIUM 5000 UNITS: 5000 INJECTION, SOLUTION INTRAVENOUS; SUBCUTANEOUS at 21:52

## 2024-12-10 RX ADMIN — HYDROXYUREA 500 MG: 500 CAPSULE ORAL at 09:01

## 2024-12-10 RX ADMIN — AMLODIPINE BESYLATE 5 MG: 5 TABLET ORAL at 09:00

## 2024-12-10 RX ADMIN — ACETAMINOPHEN 650 MG: 325 TABLET ORAL at 03:30

## 2024-12-10 NOTE — ASSESSMENT & PLAN NOTE
Patient with history of hypertension, SVT presenting with palpitations and a syncopal episode at home while sitting in a chair.  No further episodes, denies any chest pain, shortness of breath  Cardiology following, input appreciated  Orthostasis negative  Telemetry with evidence of bradycardia down to 30s last evening and intermittent episode of tach arrhythmia  2D echo pending  Continue to monitor on telemetry, likely will require pacemaker  Cardiology follow-up

## 2024-12-10 NOTE — PROGRESS NOTES
Progress Note - Hospitalist   Name: Blank Mensah 89 y.o. female I MRN: [id number]  Unit/Bed#: [address] [place], [place] [address]  Date of Admission: 12/9/2024   Date of Service: 12/10/2024 I Hospital Day: 0    Assessment & Plan  Syncope   Patient with history of hypertension, SVT presenting with palpitations and a syncopal episode at home while sitting in a chair.  No further episodes, denies any chest pain, shortness of breath  Cardiology following, input appreciated  Orthostasis negative  Telemetry with evidence of bradycardia down to 30s last evening and intermittent episode of tach arrhythmia  2D echo pending  Continue to monitor on telemetry, likely will require pacemaker  Cardiology follow-up  Essential hypertension  Blood pressure elevated in the emergency room.  Now improved  Norvasc increased to 5 mg daily.    Toprol-XL is on hold as noted above.  Paroxysmal SVT (supraventricular tachycardia) (HCC)  Holding metoprolol for bradycardia  Continue to monitor on telemetry.  Follow-up with recommendations from cardiology.  Hyperlipidemia  Continue statin therapy.  Hypothyroidism  TSH performed in the emergency room is 0.528.  Continue levothyroxine 75 mcg daily.    VTE Pharmacologic Prophylaxis:   Moderate Risk (Score 3-4) - Pharmacological DVT Prophylaxis Ordered: heparin.    Mobility:   Basic Mobility Inpatient Raw Score: 19  JH-HLM Goal: 6: Walk 10 steps or more  JH-HLM Achieved: 6: Walk 10 steps or more  JH-HLM Goal achieved. Continue to encourage appropriate mobility.    Patient Centered Rounds: I performed bedside rounds with nursing staff today.   Discussions with Specialists or Other Care Team Provider: Yes    Education and Discussions with Family / Patient:  Discussed with family at bedside.     Current Length of Stay: 0 day(s)  Current Patient Status: Inpatient   Certification Statement: The patient will continue to require additional inpatient hospital stay due to monitoring for arrhythmia  Discharge Plan: Anticipate  discharge in 48-72 hrs to home.     Code Status: Level 1 - Full Code    Subjective   Denies any further dizziness/lightheadedness, mild unsteady when ambulating  Denies any chest pain shortness of breath or palpitation  Reports good appetite  Denies any  or GI complaints    Objective :  Temp:  [97.5 °F (36.4 °C)-98 °F (36.7 °C)] 97.5 °F (36.4 °C)  HR:  [58-77] 70  BP: (132-189)/(61-86) 132/61  Resp:  [18-22] 18  SpO2:  [93 %-99 %] 95 %  O2 Device: None (Room air)    Body mass index is 23.36 kg/m².     Input and Output Summary (last 24 hours):     Intake/Output Summary (Last 24 hours) at 12/10/2024 0837  Last data filed at 12/9/2024 2001  Gross per 24 hour   Intake 480 ml   Output --   Net 480 ml       Physical Exam  Constitutional:       General: She is not in acute distress.  HENT:      Head: Normocephalic and atraumatic.   Cardiovascular:      Rate and Rhythm: Normal rate.   Pulmonary:      Effort: Pulmonary effort is normal. No respiratory distress.      Breath sounds: Normal breath sounds. No wheezing or rales.   Abdominal:      General: Bowel sounds are normal. There is no distension.      Palpations: Abdomen is soft.      Tenderness: There is no abdominal tenderness. There is no guarding or rebound.   Skin:     General: Skin is warm and dry.      Findings: No rash.   Neurological:      Mental Status: She is alert. Mental status is at baseline.      Cranial Nerves: No cranial nerve deficit.         Lines/Drains:        Telemetry:  Telemetry Orders (From admission, onward)               24 Hour Telemetry Monitoring  Continuous x 24 Hours (Telem)        Expiring   Question:  Reason for 24 Hour Telemetry  Answer:  Metabolic/electrolyte disturbance with high probability of dysrhythmia. K level <3 or >6 OR KCL infusion >10mEq/hr                     Telemetry Reviewed: Sinus Bradycardia and Sinus Tachycardia  Indication for Continued Telemetry Use: Arrthymias requiring medical therapy               Lab Results: I  have reviewed the following results:   Results from last 7 days   Lab Units 12/10/24  0439   WBC Thousand/uL 6.20   HEMOGLOBIN g/dL 11.1*   HEMATOCRIT % 34.8   PLATELETS Thousands/uL 267   SEGS PCT % 59   LYMPHO PCT % 27   MONO PCT % 11   EOS PCT % 2     Results from last 7 days   Lab Units 12/10/24  0439   SODIUM mmol/L 140   POTASSIUM mmol/L 4.0   CHLORIDE mmol/L 107   CO2 mmol/L 28   BUN mg/dL 17   CREATININE mg/dL 0.78   ANION GAP mmol/L 5   CALCIUM mg/dL 9.0   ALBUMIN g/dL 3.5   TOTAL BILIRUBIN mg/dL 0.55   ALK PHOS U/L 58   ALT U/L 8   AST U/L 16   GLUCOSE RANDOM mg/dL 85                       Recent Cultures (last 7 days):         Imaging Results Review: I reviewed radiology reports from this admission including: chest xray.  Other Study Results Review: No additional pertinent studies reviewed.    Last 24 Hours Medication List:     Current Facility-Administered Medications:     acetaminophen (TYLENOL) tablet 650 mg, Q6H PRN    amLODIPine (NORVASC) tablet 5 mg, Daily    aspirin chewable tablet 81 mg, Daily    atorvastatin (LIPITOR) tablet 80 mg, Daily With Dinner    heparin (porcine) subcutaneous injection 5,000 Units, Q12H ALINA    hydrALAZINE (APRESOLINE) injection 5 mg, Q6H PRN    hydroxyurea (HYDREA) capsule 500 mg, Daily    levothyroxine tablet 75 mcg, Early Morning    meclizine (ANTIVERT) tablet 25 mg, TID PRN    pantoprazole (PROTONIX) EC tablet 40 mg, Early Morning    Administrative Statements   Today, Patient Was Seen By: Shyann Peterson MD  I have spent a total time of 40 minutes in caring for this patient on the day of the visit/encounter including Instructions for management, Patient and family education, Documenting in the medical record, Reviewing / ordering tests, medicine, procedures  , Obtaining or reviewing history  , and Communicating with other healthcare professionals .    **Please Note: This note may have been constructed using a voice recognition system.**

## 2024-12-10 NOTE — ASSESSMENT & PLAN NOTE
Blood pressure elevated in the emergency room.  Now improved  Norvasc increased to 5 mg daily.    Toprol-XL is on hold as noted above.

## 2024-12-10 NOTE — ASSESSMENT & PLAN NOTE
blood pressure 189/79 in the emergency room  will increase Norvasc to 5 mg daily and give extra Norvasc 2.5 mg at this time  continue to monitor and adjust medications as needed  previously was seen by Dr. Santana, but states she has not seen him in a while

## 2024-12-10 NOTE — CASE MANAGEMENT
Case Management Assessment & Discharge Planning Note    Patient name Blank Mensah  Location [facility] [place] [facility] [place] [facility] MRN [id number]  : 3/16/1934 Date 12/10/2024       Current Admission Date: 2024  Current Admission Diagnosis:Syncope   Patient Active Problem List    Diagnosis Date Noted Date Diagnosed    Syncope 2024     Essential thrombocytosis (HCC) 2024     Hyperlipidemia 02/10/2021     Gastroesophageal reflux disease without esophagitis 02/10/2021     Bilateral carotid artery stenosis 10/16/2020     Leukocytosis 10/05/2020     TIA (transient ischemic attack) 10/04/2020     Trouble breathing      Paroxysmal SVT (supraventricular tachycardia) (HCC) 2018     Hypothyroidism      Essential hypertension      Anxiety        LOS (days): 0  Geometric Mean LOS (GMLOS) (days):   Days to GMLOS:     OBJECTIVE:         Current admission status: Observation  Referral Reason: Other (Discharge planning)    Preferred Pharmacy:   [facility] [address] [place] [address] - [address]  [address]  [facility] [address]  Phone: [phone] Fax: [phone]    Primary Care Provider: Andreia Barba DO    Primary Insurance: MEDICARE  Secondary Insurance: AET[employer]    ASSESSMENT:  Active Health Care Proxies    There are no active Health Care Proxies on file.        Obs Notice Signed: 24    Readmission Root Cause  30 Day Readmission: No    Patient Information  Admitted from:: Home  Mental Status: Alert  During Assessment patient was accompanied by: Daughter (Daughters Padmini and Tiffanie at bedside)  Assessment information provided by:: Patient, Daughter  Primary Caregiver: Self  Support Systems: Daughter, Family members  County of Residence: [place]  What city do you live in?: [place]  Home entry access options. Select all that apply.: Stairs  Number of steps to enter home.: 6 (3+3)  Type of Current Residence: [facility]  Living Arrangements: Lives Alone (family members present frequently)    Activities  of Daily Living Prior to Admission  Functional Status: Independent  Completes ADLs independently?: Yes  Ambulates independently?: Yes  Does patient use assisted devices?: No  Does patient currently own DME?: No  Does patient have a history of Outpatient Therapy (PT/OT)?: Yes  Does the patient have a history of Short-Term Rehab?: No  Does patient have a history of HHC?: No  Does patient currently have HHC?: No    Patient Information Continued  Income Source: Pension/[employer]  Does patient have prescription coverage?: Yes  Does patient receive dialysis treatments?: No    Means of Transportation  Means of Transport to Appts:: Family transport      DISCHARGE DETAILS:    Discharge planning discussed with:: Patient and daughters Yoel  Freedom of Choice: Yes    Comments - Freedom of Choice: SW spoke with patient and daughters at bedside to introduce role of CM, conduct assessment and discuss discharge planning.  Patient lives alone but has frequent daily support from family.  Patient's preference is to return home when medically cleared and family will transport her home.  At this time there are no rehabilitation needs anticipated and SW will continue to follow.      CM contacted family/caregiver?: Yes  Were Treatment Team discharge recommendations reviewed with patient/caregiver?: Yes  Did patient/caregiver verbalize understanding of patient care needs?: Yes  Were patient/caregiver advised of the risks associated with not following Treatment Team discharge recommendations?: Yes    Contacts  Patient Contacts: Padmini Norman (daughter)  Relationship to Patient:: Family  Contact Method: In Person  Reason/Outcome: Emergency Contact, Discharge Planning, Continuity of Care    Requested Home Health Care         Is the patient interested in HHC at discharge?: No    DME Referral Provided  Referral made for DME?: No    Other Referral/Resources/Interventions Provided:  Interventions: None Indicated    Would you like to  participate in our [facility] service program?  : No - Declined    Treatment Team Recommendation: Home  Discharge Destination Plan:: Home  Transport at Discharge : Family

## 2024-12-10 NOTE — TELEPHONE ENCOUNTER
Daughter called to see if patient was taking Levothyroxine 75 mcg.  Verified per after visit summary from 11/26/24 and order to [facility].

## 2024-12-10 NOTE — ASSESSMENT & PLAN NOTE
Holding metoprolol for bradycardia  Continue to monitor on telemetry.  Follow-up with recommendations from cardiology.

## 2024-12-10 NOTE — PROGRESS NOTES
Progress Note - Cardiology   Name: Blank Mensah 89 y.o. female I MRN: [id number]  Unit/Bed#: [address] [place] [address] I Date of Admission: 12/9/2024   Date of Service: 12/10/2024 I Hospital Day: 0    Assessment & Plan  Syncope  patient with history of TIA  patient states history of palpitations which at times will make her feel she is going to pass out  will hold Toprol-XL 25 mg at this time  continue monitor telemetry for arrhythmia  telemetry overnight noted episodes of tachyarrhythmia up to 150 bpm and also periods of profound bradycardia in the 30s, no pauses noted  12/10/2024 TTE: pending  Essential hypertension  blood pressure 189/79 in the emergency room  will increase Norvasc to 5 mg daily and give extra Norvasc 2.5 mg at this time  continue to monitor and adjust medications as needed  previously was seen by Dr. Santana, but states she has not seen him in a while  Paroxysmal SVT (supraventricular tachycardia) (HCC)  hold beta-blocker at this time and continue to monitor telemetry, may need extended ambulatory monitor versus loop recorder at discharge    Subjective   Chief Complaint: Patient seen and examined. No complaints offered at this time. Telemetry reviewed and patient appears to have sick sinus syndrome she had sinus bradycardia with rates to the 30s with episodes of sinus tachycardia at 150. Patient is currently off beta-blocker. Question whether profound bradycardia was what caused her to pass out yesterday.    Patient is an active 89-year-old female who lives independently and is independent in her ADLs and IADLs      Objective :  Temp:  [97.5 °F (36.4 °C)-98 °F (36.7 °C)] 97.5 °F (36.4 °C)  HR:  [58-77] 70  BP: (132-189)/(61-86) 132/61  Resp:  [18-22] 18  SpO2:  [93 %-99 %] 95 %  O2 Device: None (Room air)  Orthostatic Blood Pressures      Flowsheet Row Most Recent Value   Blood Pressure 132/61 filed at 12/10/2024 0325   Patient Position - Orthostatic VS Lying filed at 12/10/2024 0325          First Weight:  Weight - Scale: 55.8 kg (123 lb) (12/09/24 1009)  Vitals:    12/09/24 1420 12/10/24 0600   Weight: 55.8 kg (123 lb) 54.3 kg (119 lb 9.6 oz)     Physical Exam  Vitals and nursing note reviewed.   Constitutional:       General: She is not in acute distress.     Appearance: Normal appearance. She is normal weight.   HENT:      Right Ear: External ear normal.      Left Ear: External ear normal.      Nose: Nose normal.   Eyes:      General: No scleral icterus.        Right eye: No discharge.         Left eye: No discharge.   Cardiovascular:      Rate and Rhythm: Regular rhythm. Bradycardia present.      Pulses: Normal pulses.   Pulmonary:      Effort: Pulmonary effort is normal. No respiratory distress.      Breath sounds: Normal breath sounds.   Abdominal:      General: Bowel sounds are normal. There is no distension.      Palpations: Abdomen is soft.   Musculoskeletal:      Right lower leg: No edema.      Left lower leg: No edema.   Skin:     General: Skin is warm and dry.      Capillary Refill: Capillary refill takes less than 2 seconds.   Neurological:      General: No focal deficit present.      Mental Status: She is alert and oriented to person, place, and time. Mental status is at baseline.   Psychiatric:         Mood and Affect: Mood normal.           Lab Results: I have reviewed the following results:  Results from last 7 days   Lab Units 12/10/24  0439 12/09/24  1150   WBC Thousand/uL 6.20 5.10   HEMOGLOBIN g/dL 11.1* 12.8   HEMATOCRIT % 34.8 39.9   PLATELETS Thousands/uL 267 328     Results from last 7 days   Lab Units 12/10/24  0439 12/09/24  1150   POTASSIUM mmol/L 4.0 3.5   CHLORIDE mmol/L 107 105   CO2 mmol/L 28 30   BUN mg/dL 17 13   CREATININE mg/dL 0.78 0.84   CALCIUM mg/dL 9.0 9.2         Lab Results   Component Value Date    HGBA1C 5.5 10/05/2020     Lab Results   Component Value Date    TROPONINI <0.02 10/04/2020     Telemetry: concerning for sick sinus syndrome    VTE Pharmacologic Prophylaxis: VTE  covered by:  heparin (porcine), Subcutaneous, 5,000 Units at 12/09/24 2125     VTE Mechanical Prophylaxis: sequential compression device    Lorraine NEAL  Cardiology

## 2024-12-10 NOTE — ASSESSMENT & PLAN NOTE
patient with history of TIA  patient states history of palpitations which at times will make her feel she is going to pass out  will hold Toprol-XL 25 mg at this time  continue monitor telemetry for arrhythmia  telemetry overnight noted episodes of tachyarrhythmia up to 150 bpm and also periods of profound bradycardia in the 30s, no pauses noted  12/10/2024 TTE: pending

## 2024-12-11 PROBLEM — E87.8 DISORDER OF ELECTROLYTES: Status: ACTIVE | Noted: 2024-12-11

## 2024-12-11 PROBLEM — D64.9 ANEMIA: Status: ACTIVE | Noted: 2024-12-11

## 2024-12-11 LAB
ANION GAP SERPL CALCULATED.3IONS-SCNC: 6 MMOL/L (ref 4–13)
ATRIAL RATE: 68 BPM
BUN SERPL-MCNC: 20 MG/DL (ref 5–25)
CALCIUM SERPL-MCNC: 8.7 MG/DL (ref 8.4–10.2)
CHLORIDE SERPL-SCNC: 104 MMOL/L (ref 96–108)
CO2 SERPL-SCNC: 27 MMOL/L (ref 21–32)
CREAT SERPL-MCNC: 0.81 MG/DL (ref 0.6–1.3)
ERYTHROCYTE [DISTWIDTH] IN BLOOD BY AUTOMATED COUNT: 14.6 % (ref 11.6–15.1)
FERRITIN SERPL-MCNC: 113 NG/ML (ref 11–307)
FOLATE SERPL-MCNC: 16.6 NG/ML
GFR SERPL CREATININE-BSD FRML MDRD: 64 ML/MIN/1.73SQ M
GLUCOSE SERPL-MCNC: 81 MG/DL (ref 65–140)
HCT VFR BLD AUTO: 33.4 % (ref 34.8–46.1)
HGB BLD-MCNC: 10.9 G/DL (ref 11.5–15.4)
IRON SATN MFR SERPL: 28 % (ref 15–50)
IRON SERPL-MCNC: 71 UG/DL (ref 50–212)
MAGNESIUM SERPL-MCNC: 1.8 MG/DL (ref 1.9–2.7)
MCH RBC QN AUTO: 37.8 PG (ref 26.8–34.3)
MCHC RBC AUTO-ENTMCNC: 32.6 G/DL (ref 31.4–37.4)
MCV RBC AUTO: 116 FL (ref 82–98)
P AXIS: 38 DEGREES
PLATELET # BLD AUTO: 257 THOUSANDS/UL (ref 149–390)
PMV BLD AUTO: 10.8 FL (ref 8.9–12.7)
POTASSIUM SERPL-SCNC: 3.8 MMOL/L (ref 3.5–5.3)
PR INTERVAL: 152 MS
QRS AXIS: 5 DEGREES
QRSD INTERVAL: 76 MS
QT INTERVAL: 392 MS
QTC INTERVAL: 416 MS
RBC # BLD AUTO: 2.88 MILLION/UL (ref 3.81–5.12)
SODIUM SERPL-SCNC: 137 MMOL/L (ref 135–147)
T WAVE AXIS: 46 DEGREES
TIBC SERPL-MCNC: 254 UG/DL (ref 250–450)
UIBC SERPL-MCNC: 183 UG/DL (ref 155–355)
VENTRICULAR RATE: 68 BPM
VIT B12 SERPL-MCNC: 231 PG/ML (ref 180–914)
WBC # BLD AUTO: 5.39 THOUSAND/UL (ref 4.31–10.16)

## 2024-12-11 PROCEDURE — 83540 ASSAY OF IRON: CPT | Performed by: INTERNAL MEDICINE

## 2024-12-11 PROCEDURE — 99232 SBSQ HOSP IP/OBS MODERATE 35: CPT | Performed by: INTERNAL MEDICINE

## 2024-12-11 PROCEDURE — 82728 ASSAY OF FERRITIN: CPT | Performed by: INTERNAL MEDICINE

## 2024-12-11 PROCEDURE — 82607 VITAMIN B-12: CPT | Performed by: INTERNAL MEDICINE

## 2024-12-11 PROCEDURE — 93010 ELECTROCARDIOGRAM REPORT: CPT | Performed by: INTERNAL MEDICINE

## 2024-12-11 PROCEDURE — 82746 ASSAY OF FOLIC ACID SERUM: CPT | Performed by: INTERNAL MEDICINE

## 2024-12-11 PROCEDURE — 83735 ASSAY OF MAGNESIUM: CPT | Performed by: INTERNAL MEDICINE

## 2024-12-11 PROCEDURE — 80048 BASIC METABOLIC PNL TOTAL CA: CPT | Performed by: INTERNAL MEDICINE

## 2024-12-11 PROCEDURE — 85027 COMPLETE CBC AUTOMATED: CPT | Performed by: INTERNAL MEDICINE

## 2024-12-11 PROCEDURE — 83550 IRON BINDING TEST: CPT | Performed by: INTERNAL MEDICINE

## 2024-12-11 RX ORDER — CHLORHEXIDINE GLUCONATE ORAL RINSE 1.2 MG/ML
15 SOLUTION DENTAL ONCE
Status: COMPLETED | OUTPATIENT
Start: 2024-12-11 | End: 2024-12-12

## 2024-12-11 RX ORDER — MAGNESIUM SULFATE HEPTAHYDRATE 40 MG/ML
2 INJECTION, SOLUTION INTRAVENOUS ONCE
Status: COMPLETED | OUTPATIENT
Start: 2024-12-11 | End: 2024-12-11

## 2024-12-11 RX ADMIN — ATORVASTATIN CALCIUM 80 MG: 80 TABLET, FILM COATED ORAL at 15:44

## 2024-12-11 RX ADMIN — MAGNESIUM SULFATE HEPTAHYDRATE 2 G: 40 INJECTION, SOLUTION INTRAVENOUS at 09:54

## 2024-12-11 RX ADMIN — HEPARIN SODIUM 5000 UNITS: 5000 INJECTION, SOLUTION INTRAVENOUS; SUBCUTANEOUS at 08:26

## 2024-12-11 RX ADMIN — HEPARIN SODIUM 5000 UNITS: 5000 INJECTION, SOLUTION INTRAVENOUS; SUBCUTANEOUS at 20:05

## 2024-12-11 RX ADMIN — LEVOTHYROXINE SODIUM 75 MCG: 75 TABLET ORAL at 05:37

## 2024-12-11 RX ADMIN — PANTOPRAZOLE SODIUM 40 MG: 40 TABLET, DELAYED RELEASE ORAL at 05:37

## 2024-12-11 RX ADMIN — ASPIRIN 81 MG CHEWABLE TABLET 81 MG: 81 TABLET CHEWABLE at 08:26

## 2024-12-11 RX ADMIN — AMLODIPINE BESYLATE 5 MG: 5 TABLET ORAL at 08:26

## 2024-12-11 RX ADMIN — HYDROXYUREA 500 MG: 500 CAPSULE ORAL at 08:27

## 2024-12-11 NOTE — WOUND OSTOMY CARE
Progress Note - Wound   Blank Mensah 89 y.o. female MRN: [id number]  Unit/Bed#: [address] [place] [address] Encounter: [id number]        Assessment:   This is a 89 year old female patient admitted on 12/9/24 with syncope. She was AAO x 3 and agreeable to have wound visit done. She was turned and repositioned with assist of 2 persons. Patient is continent of bowel and bladder.    Findings:  1-Area of partial thickness skin damage to mid-sacrum with pink non-granulating tissue and maceration to periwound. There was small amount of serous, foul smelling drainage. Patient stated that she applies vaseline to this area to prevent dry skin. Orders in place for wound/skin care and for prevention.  2-Bilateral heels intact - orders in place for skin care and for prevention.    Wound Care Plan:  1-Cleanse sacrobuttocks with foaming cleanser or dimethicone wipes and pat dry. Apply thin layer of Triad paste 2x/day and prn soilage/dislodgement.  2-Apply Prevent barrier cream to bilateral heels BID and PRN  3-Float heels on 2 pillows to offload pressure so heels are not in contact with mattress or pillows.  4-Ehob pressure redistribution cushion in chair when out of bed. Avoid prolonged sitting.  5-Moisturize skin daily with skin nourishing cream.  6-Turn/reposition q2h or when medically stable for pressure re-distribution on skin.     Wound 12/10/24 MASD Sacrum Mid (Active)   Wound Image   12/11/24 0822   Wound Description Pink;Pale;Non-granulating tissue 12/11/24 0822   Pressure Injury Stage N/A 12/11/24 0822   Sarita-wound Assessment Maceration 12/11/24 0822   Wound Length (cm) 4 cm 12/11/24 0822   Wound Width (cm) 2 cm 12/11/24 0822   Wound Depth (cm) 0.1 cm 12/11/24 0822   Wound Surface Area (cm^2) 8 cm^2 12/11/24 0822   Wound Volume (cm^3) 0.8 cm^3 12/11/24 0822   Calculated Wound Volume (cm^3) 0.8 cm^3 12/11/24 0822   Drainage Amount Small 12/11/24 0822   Drainage Description Serous;Clear;Foul smelling 12/11/24 0822   Treatments  Cleansed 12/11/24 0822   Dressing Triad paste 12/11/24 0822   Dressing Changed New 12/11/24 0822   Dressing Status Intact 12/11/24 0822     Discussed assessment findings, and plan of care/recommendations with Viola SUAREZ.    Wound care will follow along with patient throughout admission, please call or text with questions and concerns.    Recommendations written as orders.  Sujatha Hawkins MSN, RN, CWON

## 2024-12-11 NOTE — PROGRESS NOTES
Progress Note - Hospitalist   Name: Blank Mensah 89 y.o. female I MRN: [id number]  Unit/Bed#: [address] [place] [address] Date of Admission: 12/9/2024   Date of Service: 12/11/2024 I Hospital Day: 1    Assessment & Plan  Syncope   Patient with history of hypertension, SVT presenting with palpitations and a syncopal episode at home while sitting in a chair.  Orthostasis negative  Telemetry revealing evidence of tachybradycardia syndrome  2D echo EF 60%, normal systolic and diastolic function.  No significant valvular abnormality  No further episodes, denies any chest pain, shortness of breath  Cardiology following, input appreciated  Telemetry no further bradycardia after holding metoprolol, episodes of SVTs  Plan for pacemaker in a.m.  Cardiology follow-up  Essential hypertension  Blood pressure elevated in the emergency room.  Now improved  Norvasc increased to 5 mg daily.    Toprol-XL is on hold as noted above.  Paroxysmal SVT (supraventricular tachycardia) (HCC)  Holding metoprolol for bradycardia.  T  Continue to monitor on telemetry.  Follow-up with recommendations from cardiology.  Anemia  -Hemoglobin 12.8, macrocytic  Hemoglobin lower at 10.9 today,  Follow-up B12, folate, iron panel  Hyperlipidemia  Continue statin therapy.  Hypothyroidism  TSH performed in the emergency room is 0.528.  Continue levothyroxine 75 mcg daily.  Disorder of electrolytes  Hypomagnesemia-repleted monitor    VTE Pharmacologic Prophylaxis:   Moderate Risk (Score 3-4) - Pharmacological DVT Prophylaxis Ordered: heparin.    Mobility:   Basic Mobility Inpatient Raw Score: 19  JH-HLM Goal: 6: Walk 10 steps or more  JH-HLM Achieved: 7: Walk 25 feet or more  JH-HLM Goal achieved. Continue to encourage appropriate mobility.    Patient Centered Rounds: I performed bedside rounds with nursing staff today.   Discussions with Specialists or Other Care Team Provider: Yes    Education and Discussions with Family / Patient:  Discussed with family at  bedside.     Current Length of Stay: 1 day(s)  Current Patient Status: Inpatient   Certification Statement: The patient will continue to require additional inpatient hospital stay due to monitoring for arrhythmia  Discharge Plan: Anticipate discharge in 48-72 hrs to home.     Code Status: Level 1 - Full Code    Subjective   Denies any dizziness, lightheadedness, mild palpitations last night  Denies any chest pain or shortness of breath  No further bradycardia, episodes of tachycardia on telemetry overnight  Cardiology input noted plan for PPM in a.m.    Objective :  Temp:  [98.1 °F (36.7 °C)-98.8 °F (37.1 °C)] 98.2 °F (36.8 °C)  HR:  [77-86] 84  BP: (109-146)/(59-74) 118/68  Resp:  [16-17] 16  SpO2:  [95 %-96 %] 96 %  O2 Device: None (Room air)    Body mass index is 23.14 kg/m².     Input and Output Summary (last 24 hours):     Intake/Output Summary (Last 24 hours) at 12/11/2024 0914  Last data filed at 12/11/2024 0824  Gross per 24 hour   Intake 240 ml   Output --   Net 240 ml       Physical Exam  Constitutional:       General: She is not in acute distress.  HENT:      Head: Normocephalic and atraumatic.   Cardiovascular:      Rate and Rhythm: Normal rate.   Pulmonary:      Effort: Pulmonary effort is normal. No respiratory distress.      Breath sounds: Normal breath sounds. No wheezing or rales.   Abdominal:      General: Bowel sounds are normal. There is no distension.      Palpations: Abdomen is soft.      Tenderness: There is no abdominal tenderness. There is no guarding or rebound.   Musculoskeletal:      Right lower leg: No edema.      Left lower leg: No edema.   Skin:     General: Skin is warm and dry.      Findings: No rash.   Neurological:      Mental Status: She is alert. Mental status is at baseline.      Cranial Nerves: No cranial nerve deficit.         Lines/Drains:        Telemetry:  Telemetry Orders (From admission, onward)               24 Hour Telemetry Monitoring  Continuous x 24 Hours (Telem)         Expiring   Question:  Reason for 24 Hour Telemetry  Answer:  Metabolic/electrolyte disturbance with high probability of dysrhythmia. K level <3 or >6 OR KCL infusion >10mEq/hr                     Telemetry Reviewed: Sinus Bradycardia and Sinus Tachycardia  Indication for Continued Telemetry Use: Arrthymias requiring medical therapy               Lab Results: I have reviewed the following results:   Results from last 7 days   Lab Units 12/11/24  0533 12/10/24  0439   WBC Thousand/uL 5.39 6.20   HEMOGLOBIN g/dL 10.9* 11.1*   HEMATOCRIT % 33.4* 34.8   PLATELETS Thousands/uL 257 267   SEGS PCT %  --  59   LYMPHO PCT %  --  27   MONO PCT %  --  11   EOS PCT %  --  2     Results from last 7 days   Lab Units 12/11/24  0533 12/10/24  0439   SODIUM mmol/L 137 140   POTASSIUM mmol/L 3.8 4.0   CHLORIDE mmol/L 104 107   CO2 mmol/L 27 28   BUN mg/dL 20 17   CREATININE mg/dL 0.81 0.78   ANION GAP mmol/L 6 5   CALCIUM mg/dL 8.7 9.0   ALBUMIN g/dL  --  3.5   TOTAL BILIRUBIN mg/dL  --  0.55   ALK PHOS U/L  --  58   ALT U/L  --  8   AST U/L  --  16   GLUCOSE RANDOM mg/dL 81 85                       Recent Cultures (last 7 days):         Imaging Results Review: I reviewed radiology reports from this admission including: chest xray.  Other Study Results Review: No additional pertinent studies reviewed.    Last 24 Hours Medication List:     Current Facility-Administered Medications:     acetaminophen (TYLENOL) tablet 650 mg, Q6H PRN    amLODIPine (NORVASC) tablet 5 mg, Daily    aspirin chewable tablet 81 mg, Daily    atorvastatin (LIPITOR) tablet 80 mg, Daily With Dinner    heparin (porcine) subcutaneous injection 5,000 Units, Q12H ALINA    hydrALAZINE (APRESOLINE) injection 5 mg, Q6H PRN    hydroxyurea (HYDREA) capsule 500 mg, Daily    levothyroxine tablet 75 mcg, Early Morning    magnesium sulfate 2 g/50 mL IVPB (premix) 2 g, Once    meclizine (ANTIVERT) tablet 25 mg, TID PRN    pantoprazole (PROTONIX) EC tablet 40 mg, Early  Morning    Administrative Statements   Today, Patient Was Seen By: Shyann Peterson MD  I have spent a total time of 40 minutes in caring for this patient on the day of the visit/encounter including Instructions for management, Patient and family education, Documenting in the medical record, Reviewing / ordering tests, medicine, procedures  , Obtaining or reviewing history  , and Communicating with other healthcare professionals .    **Please Note: This note may have been constructed using a voice recognition system.**

## 2024-12-11 NOTE — ASSESSMENT & PLAN NOTE
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  patient states history of palpitations which at times will make her feel she is going to pass out  will hold Toprol-XL 25 mg at this time  continue monitor telemetry for arrhythmia  telemetry overnight 12/9 through 12/10 noted episodes of tachyarrhythmia up to 150 bpm and also periods of profound bradycardia in the 30s, no pauses noted  12/10/2024 TTE: EF visibly estimated at 60% with normal wall motion, left atrium is normal in size  overnight 12/10 to 12/11 no further bradycardia, but patient with episodes of rapid atrial fibrillation up into the 120s.  Will need implantation of pacemaker to treat slow rates so we can resume her beta-blocker  patient on schedule for dual chambered pacemaker 12/12/2024.

## 2024-12-11 NOTE — ASSESSMENT & PLAN NOTE
Holding metoprolol for bradycardia.  T  Continue to monitor on telemetry.  Follow-up with recommendations from cardiology.

## 2024-12-11 NOTE — PROGRESS NOTES
Progress Note - Cardiology   Name: Blank Mensah 89 y.o. female I MRN: [id number]  Unit/Bed#: [address] [place] [address] Date of Admission: 12/9/2024   Date of Service: 12/11/2024 I Hospital Day: 1    Assessment & Plan  Syncope  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  patient states history of palpitations which at times will make her feel she is going to pass out  will hold Toprol-XL 25 mg at this time  continue monitor telemetry for arrhythmia  telemetry overnight 12/9 through 12/10 noted episodes of tachyarrhythmia up to 150 bpm and also periods of profound bradycardia in the 30s, no pauses noted  12/10/2024 TTE: EF visibly estimated at 60% with normal wall motion, left atrium is normal in size  overnight 12/10 to 12/11 no further bradycardia, but patient with episodes of rapid atrial fibrillation up into the 120s.  Will need implantation of pacemaker to treat slow rates so we can resume her beta-blocker  patient on schedule for dual chambered pacemaker 12/12/2024.  Essential hypertension  blood pressure 189/79 in the emergency room  will increase Norvasc to 5 mg daily and give extra Norvasc 2.5 mg at this time  continue to monitor and adjust medications as needed  previously was seen by Dr. Santana, but states she has not seen him in a while  Paroxysmal SVT (supraventricular tachycardia) (HCC)  hold beta-blocker at this time and continue to monitor telemetry, may need extended ambulatory monitor versus loop recorder at discharge    Subjective   Chief Complaint: Patient seen and examined. All questions answered to best of my ability and patient satisfaction. For permanent pacemaker on 12/12/2024. Patient with rapid HR overnight off BB.      Objective :  Temp:  [98.1 °F (36.7 °C)-98.8 °F (37.1 °C)] 98.2 °F (36.8 °C)  HR:  [77-86] 84  BP: (109-146)/(59-74) 118/68  Resp:  [16-17] 16  SpO2:  [95 %-96 %] 96 %  O2 Device: None (Room air)  Orthostatic Blood Pressures      Flowsheet Row Most Recent Value   Blood  Pressure 118/68 filed at 12/11/2024 0826   Patient Position - Orthostatic VS Lying filed at 12/10/2024 2357          First Weight: Weight - Scale: 55.8 kg (123 lb) (12/09/24 1009)  Vitals:    12/10/24 1105 12/11/24 0600   Weight: 54 kg (119 lb) 53.8 kg (118 lb 8 oz)     Physical Exam  Vitals and nursing note reviewed.   Constitutional:       General: She is not in acute distress.     Appearance: Normal appearance. She is normal weight.   HENT:      Right Ear: External ear normal.      Left Ear: External ear normal.   Eyes:      General: No scleral icterus.        Right eye: No discharge.         Left eye: No discharge.   Cardiovascular:      Rate and Rhythm: Normal rate and regular rhythm.      Pulses: Normal pulses.   Pulmonary:      Effort: Pulmonary effort is normal. No respiratory distress.      Breath sounds: Normal breath sounds.   Abdominal:      General: Bowel sounds are normal. There is no distension.      Palpations: Abdomen is soft.   Musculoskeletal:      Right lower leg: No edema.      Left lower leg: No edema.   Skin:     General: Skin is warm and dry.      Capillary Refill: Capillary refill takes less than 2 seconds.   Neurological:      General: No focal deficit present.      Mental Status: She is alert and oriented to person, place, and time. Mental status is at baseline.   Psychiatric:         Mood and Affect: Mood normal.           Lab Results: I have reviewed the following results:  Results from last 7 days   Lab Units 12/11/24  0533 12/10/24  0439 12/09/24  1150   WBC Thousand/uL 5.39 6.20 5.10   HEMOGLOBIN g/dL 10.9* 11.1* 12.8   HEMATOCRIT % 33.4* 34.8 39.9   PLATELETS Thousands/uL 257 267 328     Results from last 7 days   Lab Units 12/11/24  0533 12/10/24  0439 12/09/24  1150   POTASSIUM mmol/L 3.8 4.0 3.5   CHLORIDE mmol/L 104 107 105   CO2 mmol/L 27 28 30   BUN mg/dL 20 17 13   CREATININE mg/dL 0.81 0.78 0.84   CALCIUM mg/dL 8.7 9.0 9.2         Lab Results   Component Value Date    HGBA1C  5.5 10/05/2020     Lab Results   Component Value Date    TROPONINI <0.02 10/04/2020       Telemetry reviewed and patient with episodes of SVT off BB    VTE Pharmacologic Prophylaxis: VTE covered by:  heparin (porcine), Subcutaneous, 5,000 Units at 12/11/24 0826     VTE Mechanical Prophylaxis: sequential compression device    Lorraine NEAL  Cardiology

## 2024-12-11 NOTE — ASSESSMENT & PLAN NOTE
Patient with history of hypertension, SVT presenting with palpitations and a syncopal episode at home while sitting in a chair.  Orthostasis negative  Telemetry revealing evidence of tachybradycardia syndrome  2D echo EF 60%, normal systolic and diastolic function.  No significant valvular abnormality  No further episodes, denies any chest pain, shortness of breath  Cardiology following, input appreciated  Telemetry no further bradycardia after holding metoprolol, episodes of SVTs  Plan for pacemaker in a.m.  Cardiology follow-up

## 2024-12-11 NOTE — DISCHARGE INSTR - OTHER ORDERS
Wound Care Plan:    1-Cleanse sacrobuttocks with foaming cleanser or dimethicone wipes and pat dry. Apply thin layer of Triad paste 2x/day and as needed for soilage/dislodgement.  2-Apply Prevent barrier cream or equivalent to bilateral heels 2x/day and as needed.  3-Float heels on 2 pillows in bed to offload pressure so heels are not in contact with mattress or pillows.  4-Use pressure redistribution cushion in chair when out of bed. Avoid prolonged sitting.  5-Moisturize skin daily with skin nourishing cream.  6-Turn/reposition every 2 hrs in bed and every hr when out of bed to chair for pressure re-distribution on skin.

## 2024-12-12 ENCOUNTER — APPOINTMENT (INPATIENT)
Dept: RADIOLOGY | Facility: HOSPITAL | Age: 89
DRG: 243 | End: 2024-12-12
Payer: MEDICARE

## 2024-12-12 LAB
ANION GAP SERPL CALCULATED.3IONS-SCNC: 8 MMOL/L (ref 4–13)
ATRIAL RATE: 95 BPM
BUN SERPL-MCNC: 23 MG/DL (ref 5–25)
CALCIUM SERPL-MCNC: 8.6 MG/DL (ref 8.4–10.2)
CHLORIDE SERPL-SCNC: 103 MMOL/L (ref 96–108)
CO2 SERPL-SCNC: 27 MMOL/L (ref 21–32)
CREAT SERPL-MCNC: 0.75 MG/DL (ref 0.6–1.3)
ERYTHROCYTE [DISTWIDTH] IN BLOOD BY AUTOMATED COUNT: 14.7 % (ref 11.6–15.1)
GFR SERPL CREATININE-BSD FRML MDRD: 70 ML/MIN/1.73SQ M
GLUCOSE SERPL-MCNC: 75 MG/DL (ref 65–140)
HCT VFR BLD AUTO: 33.9 % (ref 34.8–46.1)
HGB BLD-MCNC: 11 G/DL (ref 11.5–15.4)
MCH RBC QN AUTO: 37.8 PG (ref 26.8–34.3)
MCHC RBC AUTO-ENTMCNC: 32.4 G/DL (ref 31.4–37.4)
MCV RBC AUTO: 117 FL (ref 82–98)
P AXIS: 52 DEGREES
PLATELET # BLD AUTO: 269 THOUSANDS/UL (ref 149–390)
PMV BLD AUTO: 10.8 FL (ref 8.9–12.7)
POTASSIUM SERPL-SCNC: 4 MMOL/L (ref 3.5–5.3)
PR INTERVAL: 160 MS
QRS AXIS: -3 DEGREES
QRSD INTERVAL: 72 MS
QT INTERVAL: 360 MS
QTC INTERVAL: 453 MS
RBC # BLD AUTO: 2.91 MILLION/UL (ref 3.81–5.12)
SODIUM SERPL-SCNC: 138 MMOL/L (ref 135–147)
T WAVE AXIS: 10 DEGREES
VENTRICULAR RATE: 95 BPM
WBC # BLD AUTO: 6.37 THOUSAND/UL (ref 4.31–10.16)

## 2024-12-12 PROCEDURE — C1785 PMKR, DUAL, RATE-RESP: HCPCS | Performed by: INTERNAL MEDICINE

## 2024-12-12 PROCEDURE — 33208 INSRT HEART PM ATRIAL & VENT: CPT | Performed by: INTERNAL MEDICINE

## 2024-12-12 PROCEDURE — 02HK3JZ INSERTION OF PACEMAKER LEAD INTO RIGHT VENTRICLE, PERCUTANEOUS APPROACH: ICD-10-PCS | Performed by: INTERNAL MEDICINE

## 2024-12-12 PROCEDURE — 93005 ELECTROCARDIOGRAM TRACING: CPT

## 2024-12-12 PROCEDURE — 02H63JZ INSERTION OF PACEMAKER LEAD INTO RIGHT ATRIUM, PERCUTANEOUS APPROACH: ICD-10-PCS | Performed by: INTERNAL MEDICINE

## 2024-12-12 PROCEDURE — 99232 SBSQ HOSP IP/OBS MODERATE 35: CPT | Performed by: INTERNAL MEDICINE

## 2024-12-12 PROCEDURE — 93010 ELECTROCARDIOGRAM REPORT: CPT | Performed by: INTERNAL MEDICINE

## 2024-12-12 PROCEDURE — C1898 LEAD, PMKR, OTHER THAN TRANS: HCPCS | Performed by: INTERNAL MEDICINE

## 2024-12-12 PROCEDURE — 3E0102A INTRODUCTION OF ANTI-INFECTIVE ENVELOPE INTO SUBCUTANEOUS TISSUE, OPEN APPROACH: ICD-10-PCS | Performed by: INTERNAL MEDICINE

## 2024-12-12 PROCEDURE — C1894 INTRO/SHEATH, NON-LASER: HCPCS | Performed by: INTERNAL MEDICINE

## 2024-12-12 PROCEDURE — C1887 CATHETER, GUIDING: HCPCS | Performed by: INTERNAL MEDICINE

## 2024-12-12 PROCEDURE — 99152 MOD SED SAME PHYS/QHP 5/>YRS: CPT | Performed by: INTERNAL MEDICINE

## 2024-12-12 PROCEDURE — 0JH606Z INSERTION OF PACEMAKER, DUAL CHAMBER INTO CHEST SUBCUTANEOUS TISSUE AND FASCIA, OPEN APPROACH: ICD-10-PCS | Performed by: INTERNAL MEDICINE

## 2024-12-12 PROCEDURE — 99153 MOD SED SAME PHYS/QHP EA: CPT | Performed by: INTERNAL MEDICINE

## 2024-12-12 PROCEDURE — 71045 X-RAY EXAM CHEST 1 VIEW: CPT

## 2024-12-12 PROCEDURE — C1769 GUIDE WIRE: HCPCS | Performed by: INTERNAL MEDICINE

## 2024-12-12 PROCEDURE — 85027 COMPLETE CBC AUTOMATED: CPT | Performed by: INTERNAL MEDICINE

## 2024-12-12 PROCEDURE — 80048 BASIC METABOLIC PNL TOTAL CA: CPT | Performed by: INTERNAL MEDICINE

## 2024-12-12 DEVICE — LEAD 5076-45 MRI US RCMCRD
Type: IMPLANTABLE DEVICE | Site: CHEST  WALL | Status: FUNCTIONAL
Brand: CAPSUREFIX NOVUS MRI™ SURESCAN®

## 2024-12-12 DEVICE — ENVELOPE CMRM6122 ABSORB MED MR
Type: IMPLANTABLE DEVICE | Site: CHEST  WALL | Status: FUNCTIONAL
Brand: TYRX™

## 2024-12-12 DEVICE — LEAD 3830 US MKT/ 69CM MRI LBBAP
Type: IMPLANTABLE DEVICE | Site: CHEST  WALL | Status: FUNCTIONAL
Brand: SELECTSECURE™ MRI SURESCAN™

## 2024-12-12 DEVICE — IPG W1DR01 AZURE XT DR MRI USA
Type: IMPLANTABLE DEVICE | Site: CHEST  WALL | Status: FUNCTIONAL
Brand: AZURE™ XT DR MRI SURESCAN™

## 2024-12-12 RX ORDER — GENTAMICIN 40 MG/ML
INJECTION, SOLUTION INTRAMUSCULAR; INTRAVENOUS CODE/TRAUMA/SEDATION MEDICATION
Status: DISCONTINUED | OUTPATIENT
Start: 2024-12-12 | End: 2024-12-12 | Stop reason: HOSPADM

## 2024-12-12 RX ORDER — CEFAZOLIN SODIUM 2 G/50ML
SOLUTION INTRAVENOUS
Status: COMPLETED | OUTPATIENT
Start: 2024-12-12 | End: 2024-12-12

## 2024-12-12 RX ORDER — CEFAZOLIN SODIUM 1 G/50ML
1000 SOLUTION INTRAVENOUS EVERY 8 HOURS
Status: COMPLETED | OUTPATIENT
Start: 2024-12-12 | End: 2024-12-13

## 2024-12-12 RX ORDER — TRAMADOL HYDROCHLORIDE 50 MG/1
50 TABLET ORAL EVERY 6 HOURS PRN
Status: DISCONTINUED | OUTPATIENT
Start: 2024-12-12 | End: 2024-12-13 | Stop reason: HOSPADM

## 2024-12-12 RX ORDER — POLYETHYLENE GLYCOL 3350 17 G/17G
17 POWDER, FOR SOLUTION ORAL DAILY PRN
Status: DISCONTINUED | OUTPATIENT
Start: 2024-12-12 | End: 2024-12-13 | Stop reason: HOSPADM

## 2024-12-12 RX ORDER — MIDAZOLAM HYDROCHLORIDE 2 MG/2ML
INJECTION, SOLUTION INTRAMUSCULAR; INTRAVENOUS CODE/TRAUMA/SEDATION MEDICATION
Status: DISCONTINUED | OUTPATIENT
Start: 2024-12-12 | End: 2024-12-12 | Stop reason: HOSPADM

## 2024-12-12 RX ORDER — FENTANYL CITRATE 50 UG/ML
INJECTION, SOLUTION INTRAMUSCULAR; INTRAVENOUS CODE/TRAUMA/SEDATION MEDICATION
Status: DISCONTINUED | OUTPATIENT
Start: 2024-12-12 | End: 2024-12-12 | Stop reason: HOSPADM

## 2024-12-12 RX ORDER — METOPROLOL SUCCINATE 25 MG/1
25 TABLET, EXTENDED RELEASE ORAL DAILY
Status: DISCONTINUED | OUTPATIENT
Start: 2024-12-12 | End: 2024-12-13 | Stop reason: HOSPADM

## 2024-12-12 RX ORDER — SENNOSIDES 8.6 MG
2 TABLET ORAL
Status: DISCONTINUED | OUTPATIENT
Start: 2024-12-12 | End: 2024-12-13

## 2024-12-12 RX ADMIN — TRAMADOL HYDROCHLORIDE 50 MG: 50 TABLET, COATED ORAL at 14:22

## 2024-12-12 RX ADMIN — SENNOSIDES 17.2 MG: 8.6 TABLET, FILM COATED ORAL at 21:03

## 2024-12-12 RX ADMIN — ACETAMINOPHEN 650 MG: 325 TABLET ORAL at 11:44

## 2024-12-12 RX ADMIN — LEVOTHYROXINE SODIUM 75 MCG: 75 TABLET ORAL at 05:01

## 2024-12-12 RX ADMIN — HEPARIN SODIUM 5000 UNITS: 5000 INJECTION, SOLUTION INTRAVENOUS; SUBCUTANEOUS at 20:59

## 2024-12-12 RX ADMIN — CHLORHEXIDINE GLUCONATE 15 ML: 1.2 RINSE ORAL at 07:35

## 2024-12-12 RX ADMIN — POLYETHYLENE GLYCOL 3350 17 G: 17 POWDER, FOR SOLUTION ORAL at 12:03

## 2024-12-12 RX ADMIN — CEFAZOLIN SODIUM 1000 MG: 1 SOLUTION INTRAVENOUS at 23:59

## 2024-12-12 RX ADMIN — ATORVASTATIN CALCIUM 80 MG: 80 TABLET, FILM COATED ORAL at 15:58

## 2024-12-12 RX ADMIN — DICLOFENAC SODIUM 2 G: 10 GEL TOPICAL at 21:06

## 2024-12-12 RX ADMIN — CEFAZOLIN SODIUM 1000 MG: 1 SOLUTION INTRAVENOUS at 15:58

## 2024-12-12 RX ADMIN — PANTOPRAZOLE SODIUM 40 MG: 40 TABLET, DELAYED RELEASE ORAL at 05:01

## 2024-12-12 RX ADMIN — DICLOFENAC SODIUM 2 G: 10 GEL TOPICAL at 17:49

## 2024-12-12 RX ADMIN — ACETAMINOPHEN 650 MG: 325 TABLET ORAL at 20:59

## 2024-12-12 RX ADMIN — METOPROLOL SUCCINATE 25 MG: 25 TABLET, EXTENDED RELEASE ORAL at 10:31

## 2024-12-12 NOTE — ASSESSMENT & PLAN NOTE
blood pressure 189/79 in the emergency room  will increase Norvasc to 5 mg daily and give extra Norvasc 2.5 mg at this time  continue to monitor and adjust medications as needed

## 2024-12-12 NOTE — ASSESSMENT & PLAN NOTE
-Hemoglobin 12.8, macrocytic  Hemoglobin stable at 11  Iron studies iron sat 28%, folate normal, B12 low normal  Initiate B12 replacement

## 2024-12-12 NOTE — ASSESSMENT & PLAN NOTE
Patient with history of hypertension, SVT presenting with palpitations and a syncopal episode at home while sitting in a chair.  Orthostasis negative  Telemetry revealing evidence of tachybradycardia syndrome  2D echo EF 60%, normal systolic and diastolic function.  No significant valvular abnormality  No further episodes, denies any chest pain, shortness of breath  Cardiology following, input appreciated  Status post pacemaker placement today  Monitor post pacemaker placement  Pacemaker precaution  Pain control  Monitor clinically  Cardiology follow-up

## 2024-12-12 NOTE — PROGRESS NOTES
Progress Note - Cardiology   Name: Blank Mensah 89 y.o. 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  patient states history of palpitations which at times will make her feel she is going to pass out  will hold Toprol-XL 25 mg at this time  continue monitor telemetry for arrhythmia  telemetry overnight 12/9 through 12/10 noted episodes of tachyarrhythmia up to 150 bpm and also periods of profound bradycardia in the 30s, no pauses noted  12/10/2024 TTE: EF visibly estimated at 60% with normal wall motion, left atrium is normal in size  overnight 12/10 to 12/11 no further bradycardia, but patient with episodes of SVT  Will need implantation of pacemaker to treat slow rates so we can resume her beta-blocker  12/12/2024 for implantation of Medtronic pacemaker today  consent signed and sent to Cath Lab  Essential hypertension  blood pressure 189/79 in the emergency room  will increase Norvasc to 5 mg daily and give extra Norvasc 2.5 mg at this time  continue to monitor and adjust medications as needed  Paroxysmal SVT (supraventricular tachycardia) (HCC)  hold beta-blocker at this time and continue to monitor telemetry, may need extended ambulatory monitor versus loop recorder at discharge    Subjective   Chief Complaint: No complaints offered. Patient NPO for placement of permanent pacemaker today. Will resume beta-blocker once pacemaker is in place.      Objective :  Temp:  [97.4 °F (36.3 °C)-98.2 °F (36.8 °C)] 97.4 °F (36.3 °C)  HR:  [60-84] 77  BP: (116-134)/(58-68) 116/58  Resp:  [14-16] 14  SpO2:  [96 %] 96 %  O2 Device: None (Room air)  Orthostatic Blood Pressures      Flowsheet Row Most Recent Value   Blood Pressure 116/58 filed at 12/11/2024 2331   Patient Position - Orthostatic VS Lying filed at 12/11/2024 1802          First Weight:  Weight - Scale: 55.8 kg (123 lb) (12/09/24 1009)  Vitals:    12/11/24 0600 12/12/24 0551   Weight: 53.8 kg (118 lb 8 oz) 55.8 kg (123 lb)     Physical Exam  Vitals and nursing note reviewed.   Constitutional:       General: She is not in acute distress.     Appearance: Normal appearance. She is normal weight.   HENT:      Right Ear: External ear normal.      Left Ear: External ear normal.   Eyes:      General:         Right eye: No discharge.         Left eye: No discharge.   Cardiovascular:      Rate and Rhythm: Normal rate and regular rhythm.      Pulses: Normal pulses.      Heart sounds: Murmur heard.   Pulmonary:      Effort: Pulmonary effort is normal. No respiratory distress.      Breath sounds: Normal breath sounds.   Abdominal:      General: Bowel sounds are normal. There is no distension.      Palpations: Abdomen is soft.   Musculoskeletal:      Right lower leg: No edema.      Left lower leg: No edema.   Skin:     General: Skin is warm and dry.      Capillary Refill: Capillary refill takes less than 2 seconds.   Neurological:      General: No focal deficit present.      Mental Status: She is alert and oriented to person, place, and time.   Psychiatric:         Mood and Affect: Mood normal.           Lab Results: I have reviewed the following results:  Results from last 7 days   Lab Units 12/12/24  0455 12/11/24  0533 12/10/24  0439   WBC Thousand/uL 6.37 5.39 6.20   HEMOGLOBIN g/dL 11.0* 10.9* 11.1*   HEMATOCRIT % 33.9* 33.4* 34.8   PLATELETS Thousands/uL 269 257 267     Results from last 7 days   Lab Units 12/12/24  0455 12/11/24  0533 12/10/24  0439   POTASSIUM mmol/L 4.0 3.8 4.0   CHLORIDE mmol/L 103 104 107   CO2 mmol/L 27 27 28   BUN mg/dL 23 20 17   CREATININE mg/dL 0.75 0.81 0.78   CALCIUM mg/dL 8.6 8.7 9.0         Lab Results   Component Value Date    HGBA1C 5.5 10/05/2020     Lab Results   Component Value Date    TROPONINI <0.02 10/04/2020     Telemetry demonstrates sinus rhythm with intermittent  SVT, patient on beta blocker    VTE Pharmacologic Prophylaxis: VTE covered by:  heparin (porcine), Subcutaneous, 5,000 Units at 12/11/24 2005     VTE Mechanical Prophylaxis: sequential compression device    Lorraine NEAL  Cardiology

## 2024-12-12 NOTE — DISCHARGE INSTRUCTIONS
Pacemaker   WHAT YOU NEED TO KNOW:   A pacemaker is a small device placed in your chest. It helps control your heartbeat. You may need a pacemaker if your heartbeat is too slow, too fast, or irregular. A pacemaker is about the size of a large wristwatch. It is made up of flexible wires (leads) with sensors, a battery, pulse generator, and a small computer. The sensors measure your heartbeat. They send this information to the computer. The computer causes the generator to send electrical impulses to your heart. This makes your heart beat correctly. Some pacemakers can also record your heart rate and rhythm.   DISCHARGE INSTRUCTIONS:   Call 911 for any of the following:   You have any of the following signs of a heart attack:      Squeezing, pressure, or pain in your chest     You may also have any of the following:      Discomfort or pain in your back, neck, jaw, stomach, or arm     Shortness of breath     Nausea or vomiting     Lightheadedness or a sudden cold sweat     You feel lightheaded, short of breath, and have chest pain.     You cough up blood.     Seek care immediately if:   Your arm or leg feels warm, tender, and painful. It may look swollen and red.     You feel weak, dizzy, or faint.     Your stitches come apart.     Your pulse is lower or higher than your healthcare provider said it should be.     Contact your healthcare provider if:   You have a fever or chills.     Your wound is red, swollen, or draining pus.     You have questions or concerns about your condition or care.     Care for your incision as directed: Ask your healthcare provider when you can remove your bandage. Wash around your incision with soap and water. It is okay to let soap and water run over your incision. Do not scrub your incision. Gently pat the area dry, and apply new, clean bandages as directed. Check your incision every day for redness, swelling, or pus.   Activity:   Do not lift anything heavier than 3 pounds with the arm  closest to your pacemaker. Do not lift the arm over your head until your healthcare provider says it is okay. Ask your healthcare provider how long to follow these instructions.      Do not do vigorous activities. This includes contact sports and some types of exercise. These activities can damage your pacemaker or cause your wires to move. Ask your healthcare provider what activities are safe for you to do.     Pacemaker safety:   Tell all healthcare providers that you have a pacemaker. MRI machines and certain equipment used during surgery can affect how your pacemaker works.      Limit or avoid close contact with certain electrical devices. Examples include cell phones, iPods™, microwave ovens, and generators. These devices can prevent your pacemaker from working correctly. Stand at least 2 feet from a generator. Do not put your cell phone or iPod in the chest pocket closest to your pacemaker. Use the arm opposite your pacemaker to hold and use your cell phone.      Tell airport security that you have a pacemaker before you go through the metal detectors. Metal detectors may beep because of the metal in your pacemaker. Step away from the machine if you feel dizzy or your heart rate increases. Ask the security agents not to hold a security wand over your pacemaker for more than a few seconds. Your pacemaker function or programming may be affected by the wand.      Wear medical alert identification. Wear medical alert jewelry or carry a card that says you have a pacemaker. Ask your healthcare provider where to get these items.          Check your pulse as directed. Check for 1 minute while you are resting. Write down your heart rate. Bring a copy of these numbers to your follow-up visits.     What you need to know about care of your pacemaker:   Your healthcare provider will check your pacemaker about every 3 months. Some checks may be done over the telephone. He or she will make sure it is working correctly. You  may also need regular EKGs to check the electrical activity of your heart.      Your pacemaker generator, battery, and leads will need to be replaced. The battery may need to be replaced in 6 to 7 years or longer. The generator will be replaced at the same time. The battery and generator are replaced during surgery. Your leads will need to be replaced if they cause an infection or move out of place. Your healthcare provider will monitor you and decide when these parts need to be replaced.     Follow up with your healthcare provider as directed: Write down your questions so you remember to ask them during your visits.  © Copyright [employer] 2020 Information is for End User's use only and may not be sold, redistributed or otherwise used for commercial purposes. All illustrations and images included in CareNotes® are the copyrighted property of [employer] or [employer]  The above information is an  only. It is not intended as medical advice for individual conditions or treatments. Talk to your doctor, nurse or pharmacist before following any medical regimen to see if it is safe and effective for you.

## 2024-12-12 NOTE — PLAN OF CARE
Problem: PAIN - ADULT  Goal: Verbalizes/displays adequate comfort level or baseline comfort level  Description: Interventions:  - Encourage patient to monitor pain and request assistance  - Assess pain using appropriate pain scale  - Administer analgesics based on type and severity of pain and evaluate response  - Implement non-pharmacological measures as appropriate and evaluate response  - Consider cultural and social influences on pain and pain management  - Notify physician/advanced practitioner if interventions unsuccessful or patient reports new pain  Outcome: Progressing     Problem: CARDIOVASCULAR - ADULT  Goal: Maintains optimal cardiac output and hemodynamic stability  Description: INTERVENTIONS:  - Monitor I/O, vital signs and rhythm  - Monitor for S/S and trends of decreased cardiac output  - Administer and titrate ordered vasoactive medications to optimize hemodynamic stability  - Assess quality of pulses, skin color and temperature  - Assess for signs of decreased coronary artery perfusion  - Instruct patient to report change in severity of symptoms  Outcome: Progressing  Goal: Absence of cardiac dysrhythmias or at baseline rhythm  Description: INTERVENTIONS:  - Continuous cardiac monitoring, vital signs, obtain 12 lead EKG if ordered  - Administer antiarrhythmic and heart rate control medications as ordered  - Monitor electrolytes and administer replacement therapy as ordered  Outcome: Progressing     Problem: DISCHARGE PLANNING  Goal: Discharge to home or other facility with appropriate resources  Description: INTERVENTIONS:  - Identify barriers to discharge w/patient and caregiver  - Arrange for needed discharge resources and transportation as appropriate  - Identify discharge learning needs (meds, wound care, etc.)  - Arrange for interpretive services to assist at discharge as needed  - Refer to Case Management Department for coordinating discharge planning if the patient needs post-hospital  services based on physician/advanced practitioner order or complex needs related to functional status, cognitive ability, or social support system  Outcome: Progressing

## 2024-12-12 NOTE — ASSESSMENT & PLAN NOTE
Blood pressure elevated in the emergency room.  Now improved but above goal  Norvasc increased to 5 mg daily.    Start Toprol-XL post Pacemaker placement

## 2024-12-12 NOTE — PROGRESS NOTES
Progress Note - Hospitalist   Name: Blank Mensah 89 y.o. female I MRN: [id number]  Unit/Bed#: [facility] I Date of Admission: 12/9/2024   Date of Service: 12/12/2024 I Hospital Day: 2    Assessment & Plan  Syncope   Patient with history of hypertension, SVT presenting with palpitations and a syncopal episode at home while sitting in a chair.  Orthostasis negative  Telemetry revealing evidence of tachybradycardia syndrome  2D echo EF 60%, normal systolic and diastolic function.  No significant valvular abnormality  No further episodes, denies any chest pain, shortness of breath  Cardiology following, input appreciated  Status post pacemaker placement today  Monitor post pacemaker placement  Pacemaker precaution  Pain control  Monitor clinically  Cardiology follow-up  Essential hypertension  Blood pressure elevated in the emergency room.  Now improved but above goal  Norvasc increased to 5 mg daily.    Start Toprol-XL post Pacemaker placement  Paroxysmal SVT (supraventricular tachycardia) (HCC)  Held beta-blocker initially due to tachybradycardia syndrome  Resume metoprolol post pacemaker placement  Telemetry  Anemia  -Hemoglobin 12.8, macrocytic  Hemoglobin stable at 11  Iron studies iron sat 28%, folate normal, B12 low normal  Initiate B12 replacement  Hyperlipidemia  Continue statin therapy.  Hypothyroidism  TSH performed in the emergency room is 0.528.  Continue levothyroxine 75 mcg daily.  Disorder of electrolytes  Hypomagnesemia-repleted monitor    Constipation, bowel regimen    VTE Pharmacologic Prophylaxis:   Moderate Risk (Score 3-4) - Pharmacological DVT Prophylaxis Ordered: heparin.    Mobility:   Basic Mobility Inpatient Raw Score: 19  JH-HLM Goal: 6: Walk 10 steps or more  JH-HLM Achieved: 6: Walk 10 steps or more  JH-HLM Goal achieved. Continue to encourage appropriate mobility.    Patient Centered Rounds: I performed bedside rounds with nursing staff today.   Discussions with Specialists or  Other Care Team Provider: Yes, cardiology    Education and Discussions with Family / Patient:  Discussed with the patient.     Current Length of Stay: 2 day(s)  Current Patient Status: Inpatient   Certification Statement: The patient will continue to require additional inpatient hospital stay due to monitoring for arrhythmia  Discharge Plan: Anticipate discharge in 48-72 hrs to home.     Code Status: Level 1 - Full Code    Subjective     Underwent pacemaker placement earlier today  Reports pain at pacemaker site and right lateral neck which is worse with movement  Denies chest pain, shortness of breath    Objective :  Temp:  [97.4 °F (36.3 °C)-98.2 °F (36.8 °C)] 97.4 °F (36.3 °C)  HR:  [60-84] 77  BP: (116-134)/(58-68) 116/58  Resp:  [14-16] 14  SpO2:  [96 %] 96 %  O2 Device: Nasal cannula  Nasal Cannula O2 Flow Rate (L/min):  [2 L/min] 2 L/min    Body mass index is 24.02 kg/m².     Input and Output Summary (last 24 hours):     Intake/Output Summary (Last 24 hours) at 12/12/2024 0813  Last data filed at 12/11/2024 1241  Gross per 24 hour   Intake 440 ml   Output --   Net 440 ml       Physical Exam  Constitutional:       General: She is not in acute distress.  HENT:      Head: Normocephalic and atraumatic.   Neck:      Comments: Mild tenderness over right cervicals paraspinal, worse with movement, muscular spasm noted  Cardiovascular:      Rate and Rhythm: Normal rate.      Comments: Left infraclavicular pacemaker dressing in place  Pulmonary:      Effort: Pulmonary effort is normal. No respiratory distress.      Breath sounds: Decreased breath sounds present. No wheezing or rales.   Abdominal:      General: Bowel sounds are normal. There is no distension.      Palpations: Abdomen is soft.      Tenderness: There is no abdominal tenderness. There is no guarding or rebound.   Musculoskeletal:      Cervical back: No rigidity.   Skin:     General: Skin is warm and dry.      Findings: No rash.   Neurological:      Mental  Status: She is alert. Mental status is at baseline.      Cranial Nerves: No cranial nerve deficit.         Lines/Drains:        Telemetry:  Telemetry Orders (From admission, onward)               24 Hour Telemetry Monitoring  Continuous x 24 Hours (Telem)        Expiring   Question:  Reason for 24 Hour Telemetry  Answer:  Metabolic/electrolyte disturbance with high probability of dysrhythmia. K level <3 or >6 OR KCL infusion >10mEq/hr                     Telemetry Reviewed: Sinus Bradycardia and Sinus Tachycardia  Indication for Continued Telemetry Use: Arrthymias requiring medical therapy               Lab Results: I have reviewed the following results:   Results from last 7 days   Lab Units 12/12/24  0455 12/11/24  0533 12/10/24  0439   WBC Thousand/uL 6.37   < > 6.20   HEMOGLOBIN g/dL 11.0*   < > 11.1*   HEMATOCRIT % 33.9*   < > 34.8   PLATELETS Thousands/uL 269   < > 267   SEGS PCT %  --   --  59   LYMPHO PCT %  --   --  27   MONO PCT %  --   --  11   EOS PCT %  --   --  2    < > = values in this interval not displayed.     Results from last 7 days   Lab Units 12/12/24  0455 12/11/24  0533 12/10/24  0439   SODIUM mmol/L 138   < > 140   POTASSIUM mmol/L 4.0   < > 4.0   CHLORIDE mmol/L 103   < > 107   CO2 mmol/L 27   < > 28   BUN mg/dL 23   < > 17   CREATININE mg/dL 0.75   < > 0.78   ANION GAP mmol/L 8   < > 5   CALCIUM mg/dL 8.6   < > 9.0   ALBUMIN g/dL  --   --  3.5   TOTAL BILIRUBIN mg/dL  --   --  0.55   ALK PHOS U/L  --   --  58   ALT U/L  --   --  8   AST U/L  --   --  16   GLUCOSE RANDOM mg/dL 75   < > 85    < > = values in this interval not displayed.                       Recent Cultures (last 7 days):         Imaging Results Review: I reviewed radiology reports from this admission including: chest xray.  Other Study Results Review: No additional pertinent studies reviewed.    Last 24 Hours Medication List:     Current Facility-Administered Medications:     [Transfer Hold] acetaminophen (TYLENOL) tablet  650 mg, Q6H PRN    [Transfer Hold] amLODIPine (NORVASC) tablet 5 mg, Daily    [Transfer Hold] aspirin chewable tablet 81 mg, Daily    [Transfer Hold] atorvastatin (LIPITOR) tablet 80 mg, Daily With Dinner    ceFAZolin (ANCEF) IVPB (premix in dextrose), Code/Trauma/Sedation Continuous Med    fentaNYL injection, Code/Trauma/Sedation Med    [Transfer Hold] heparin (porcine) subcutaneous injection 5,000 Units, Q12H ALINA    [Transfer Hold] hydrALAZINE (APRESOLINE) injection 5 mg, Q6H PRN    [Transfer Hold] hydroxyurea (HYDREA) capsule 500 mg, Daily    [Transfer Hold] levothyroxine tablet 75 mcg, Early Morning    [Transfer Hold] meclizine (ANTIVERT) tablet 25 mg, TID PRN    midazolam (VERSED) injection, Code/Trauma/Sedation Med    [Transfer Hold] pantoprazole (PROTONIX) EC tablet 40 mg, Early Morning    Administrative Statements   Today, Patient Was Seen By: Shyann Peterson MD  I have spent a total time of 40 minutes in caring for this patient on the day of the visit/encounter including Instructions for management, Patient and family education, Documenting in the medical record, Reviewing / ordering tests, medicine, procedures  , Obtaining or reviewing history  , and Communicating with other healthcare professionals .    **Please Note: This note may have been constructed using a voice recognition system.**

## 2024-12-12 NOTE — ASSESSMENT & PLAN NOTE
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  patient states history of palpitations which at times will make her feel she is going to pass out  will hold Toprol-XL 25 mg at this time  continue monitor telemetry for arrhythmia  telemetry overnight 12/9 through 12/10 noted episodes of tachyarrhythmia up to 150 bpm and also periods of profound bradycardia in the 30s, no pauses noted  12/10/2024 TTE: EF visibly estimated at 60% with normal wall motion, left atrium is normal in size  overnight 12/10 to 12/11 no further bradycardia, but patient with episodes of SVT  Will need implantation of pacemaker to treat slow rates so we can resume her beta-blocker  12/12/2024 for implantation of [employer]tr[employer] pacemaker today  consent signed and sent to Cath Lab

## 2024-12-12 NOTE — DISCHARGE INSTR - AVS FIRST PAGE
Permanent Pacemaker Discharge Instructions    -  DO NOT raise your affected arm over your head for 3 weeks      -  TAKE Aquacel dressing off of incision site in 5-7 days, beneath dressing will be Steri-Strips over the incision, these will dry up in fall off on their own.      -  DO NOT drive until seen by the Cardiologist for site visit      -  DO NOT lift, pull or push anything over 10-15 pounds for at least 3-4 weeks      -  DO NOT manipulate or put pressure over the pacemaker site      -  Avoid wearing clothing that must be pulled over your head for 4-6 weeks    Incision/Site Care    -  Check the incision daily.  Expect to see a scab forming over the incision and the skin color returning to normal.  Notify your doctor immediately if your incision suddenly becomes red, swollen, hot to touch or develops any kind of drainage or bleeding.      -  monitor for fever greater than 101 and for any of the above call the MD      -  checked incision daily.  Expect to see some bruising and swelling.  These will go away within several weeks.  Call cardiologist if you note this worsening.      -  keep the incision clean and dry.  You may place a sterile gauze pad over the incision and change it daily.        -  do not put on any medication over the incision site unless you have been given specific instructions by the physician.      -  over your incision are Steri (paper) strips, leave these in place and they will curl up and fall off on their own as you heal.        -  try to expose the incision to air at least for several hours per day.      -  gently wash around pacemaker and incision area with plain water and a clean washcloth daily.  Gently towel dry the incision area.

## 2024-12-12 NOTE — ASSESSMENT & PLAN NOTE
Held beta-blocker initially due to tachybradycardia syndrome  Resume metoprolol post pacemaker placement  Telemetry

## 2024-12-13 ENCOUNTER — APPOINTMENT (INPATIENT)
Dept: RADIOLOGY | Facility: HOSPITAL | Age: 89
DRG: 243 | End: 2024-12-13
Payer: MEDICARE

## 2024-12-13 VITALS
TEMPERATURE: 97.8 F | SYSTOLIC BLOOD PRESSURE: 125 MMHG | HEIGHT: 60 IN | WEIGHT: 122.58 LBS | DIASTOLIC BLOOD PRESSURE: 59 MMHG | HEART RATE: 68 BPM | RESPIRATION RATE: 18 BRPM | BODY MASS INDEX: 24.07 KG/M2 | OXYGEN SATURATION: 95 %

## 2024-12-13 PROBLEM — E87.8 DISORDER OF ELECTROLYTES: Status: RESOLVED | Noted: 2024-12-11 | Resolved: 2024-12-13

## 2024-12-13 PROBLEM — R55 SYNCOPE: Status: RESOLVED | Noted: 2024-12-09 | Resolved: 2024-12-13

## 2024-12-13 LAB
ANION GAP SERPL CALCULATED.3IONS-SCNC: 3 MMOL/L (ref 4–13)
ATRIAL RATE: 80 BPM
BUN SERPL-MCNC: 19 MG/DL (ref 5–25)
CALCIUM SERPL-MCNC: 9.1 MG/DL (ref 8.4–10.2)
CHLORIDE SERPL-SCNC: 104 MMOL/L (ref 96–108)
CO2 SERPL-SCNC: 29 MMOL/L (ref 21–32)
CREAT SERPL-MCNC: 0.75 MG/DL (ref 0.6–1.3)
ERYTHROCYTE [DISTWIDTH] IN BLOOD BY AUTOMATED COUNT: 14.9 % (ref 11.6–15.1)
GFR SERPL CREATININE-BSD FRML MDRD: 70 ML/MIN/1.73SQ M
GLUCOSE SERPL-MCNC: 86 MG/DL (ref 65–140)
HCT VFR BLD AUTO: 35.5 % (ref 34.8–46.1)
HGB BLD-MCNC: 11.5 G/DL (ref 11.5–15.4)
MAGNESIUM SERPL-MCNC: 2.1 MG/DL (ref 1.9–2.7)
MCH RBC QN AUTO: 38.1 PG (ref 26.8–34.3)
MCHC RBC AUTO-ENTMCNC: 32.4 G/DL (ref 31.4–37.4)
MCV RBC AUTO: 118 FL (ref 82–98)
P AXIS: 29 DEGREES
PLATELET # BLD AUTO: 263 THOUSANDS/UL (ref 149–390)
PMV BLD AUTO: 10.5 FL (ref 8.9–12.7)
POTASSIUM SERPL-SCNC: 4 MMOL/L (ref 3.5–5.3)
PR INTERVAL: 150 MS
QRS AXIS: 17 DEGREES
QRSD INTERVAL: 80 MS
QT INTERVAL: 390 MS
QTC INTERVAL: 450 MS
RBC # BLD AUTO: 3.02 MILLION/UL (ref 3.81–5.12)
SODIUM SERPL-SCNC: 136 MMOL/L (ref 135–147)
T WAVE AXIS: 29 DEGREES
VENTRICULAR RATE: 80 BPM
WBC # BLD AUTO: 6 THOUSAND/UL (ref 4.31–10.16)

## 2024-12-13 PROCEDURE — 80048 BASIC METABOLIC PNL TOTAL CA: CPT | Performed by: INTERNAL MEDICINE

## 2024-12-13 PROCEDURE — 85027 COMPLETE CBC AUTOMATED: CPT | Performed by: INTERNAL MEDICINE

## 2024-12-13 PROCEDURE — 99239 HOSP IP/OBS DSCHRG MGMT >30: CPT | Performed by: INTERNAL MEDICINE

## 2024-12-13 PROCEDURE — 71046 X-RAY EXAM CHEST 2 VIEWS: CPT

## 2024-12-13 PROCEDURE — 99024 POSTOP FOLLOW-UP VISIT: CPT | Performed by: INTERNAL MEDICINE

## 2024-12-13 PROCEDURE — 83735 ASSAY OF MAGNESIUM: CPT | Performed by: INTERNAL MEDICINE

## 2024-12-13 PROCEDURE — 93010 ELECTROCARDIOGRAM REPORT: CPT | Performed by: INTERNAL MEDICINE

## 2024-12-13 RX ORDER — AMLODIPINE BESYLATE 5 MG/1
5 TABLET ORAL DAILY
Qty: 30 TABLET | Refills: 5 | Status: SHIPPED | OUTPATIENT
Start: 2024-12-14

## 2024-12-13 RX ORDER — ACETAMINOPHEN 325 MG/1
650 TABLET ORAL EVERY 6 HOURS PRN
Start: 2024-12-13

## 2024-12-13 RX ORDER — BISACODYL 5 MG/1
5 TABLET, DELAYED RELEASE ORAL ONCE
Status: COMPLETED | OUTPATIENT
Start: 2024-12-13 | End: 2024-12-13

## 2024-12-13 RX ADMIN — CYANOCOBALAMIN TAB 500 MCG 500 MCG: 500 TAB at 08:44

## 2024-12-13 RX ADMIN — DICLOFENAC SODIUM 2 G: 10 GEL TOPICAL at 08:46

## 2024-12-13 RX ADMIN — ACETAMINOPHEN 650 MG: 325 TABLET ORAL at 02:55

## 2024-12-13 RX ADMIN — METOPROLOL SUCCINATE 25 MG: 25 TABLET, EXTENDED RELEASE ORAL at 08:45

## 2024-12-13 RX ADMIN — HYDROXYUREA 500 MG: 500 CAPSULE ORAL at 08:47

## 2024-12-13 RX ADMIN — CEFAZOLIN SODIUM 1000 MG: 1 SOLUTION INTRAVENOUS at 08:46

## 2024-12-13 RX ADMIN — PANTOPRAZOLE SODIUM 40 MG: 40 TABLET, DELAYED RELEASE ORAL at 05:39

## 2024-12-13 RX ADMIN — TRAMADOL HYDROCHLORIDE 50 MG: 50 TABLET, COATED ORAL at 08:45

## 2024-12-13 RX ADMIN — HEPARIN SODIUM 5000 UNITS: 5000 INJECTION, SOLUTION INTRAVENOUS; SUBCUTANEOUS at 08:46

## 2024-12-13 RX ADMIN — ASPIRIN 81 MG CHEWABLE TABLET 81 MG: 81 TABLET CHEWABLE at 08:44

## 2024-12-13 RX ADMIN — AMLODIPINE BESYLATE 5 MG: 5 TABLET ORAL at 08:45

## 2024-12-13 RX ADMIN — BISACODYL 5 MG: 5 TABLET, COATED ORAL at 13:07

## 2024-12-13 RX ADMIN — LEVOTHYROXINE SODIUM 75 MCG: 75 TABLET ORAL at 05:39

## 2024-12-13 NOTE — ASSESSMENT & PLAN NOTE
Blood pressure elevated in the emergency room.  Now improved   Norvasc increased to 5 mg daily.    Resumed Toprol-XL post Pacemaker placement

## 2024-12-13 NOTE — ASSESSMENT & PLAN NOTE
Patient with history of hypertension, SVT presenting with palpitations and a syncopal episode at home while sitting in a chair.  Orthostasis negative  Telemetry revealing evidence of tachybradycardia syndrome  2D echo EF 60%, normal systolic and diastolic function.  No significant valvular abnormality  No further episodes, denies any chest pain, shortness of breath  Cardiology following, input appreciated  Status post pacemaker placement 12/12  Doing well, minimal pain at pacemaker site controlled with Tylenol  Postprocedure chest x-ray without any acute abnormality  Cardiology follow-up appreciated, patient was cleared to discharge with outpatient follow-up  Pacemaker precautions  Pain control as needed with Tylenol  Cardiology follow-up after discharge as scheduled

## 2024-12-13 NOTE — PLAN OF CARE
Problem: PAIN - ADULT  Goal: Verbalizes/displays adequate comfort level or baseline comfort level  Description: Interventions:  - Encourage patient to monitor pain and request assistance  - Assess pain using appropriate pain scale  - Administer analgesics based on type and severity of pain and evaluate response  - Implement non-pharmacological measures as appropriate and evaluate response  - Consider cultural and social influences on pain and pain management  - Notify physician/advanced practitioner if interventions unsuccessful or patient reports new pain  Outcome: Progressing     Problem: DISCHARGE PLANNING  Goal: Discharge to home or other facility with appropriate resources  Description: INTERVENTIONS:  - Identify barriers to discharge w/patient and caregiver  - Arrange for needed discharge resources and transportation as appropriate  - Identify discharge learning needs (meds, wound care, etc.)  - Arrange for interpretive services to assist at discharge as needed  - Refer to Case Management Department for coordinating discharge planning if the patient needs post-hospital services based on physician/advanced practitioner order or complex needs related to functional status, cognitive ability, or social support system  Outcome: Progressing     Problem: Knowledge Deficit  Goal: Patient/family/caregiver demonstrates understanding of disease process, treatment plan, medications, and discharge instructions  Description: Complete learning assessment and assess knowledge base.  Interventions:  - Provide teaching at level of understanding  - Provide teaching via preferred learning methods  Outcome: Progressing     Problem: CARDIOVASCULAR - ADULT  Goal: Maintains optimal cardiac output and hemodynamic stability  Description: INTERVENTIONS:  - Monitor I/O, vital signs and rhythm  - Monitor for S/S and trends of decreased cardiac output  - Administer and titrate ordered vasoactive medications to optimize hemodynamic  stability  - Assess quality of pulses, skin color and temperature  - Assess for signs of decreased coronary artery perfusion  - Instruct patient to report change in severity of symptoms  Outcome: Progressing  Goal: Absence of cardiac dysrhythmias or at baseline rhythm  Description: INTERVENTIONS:  - Continuous cardiac monitoring, vital signs, obtain 12 lead EKG if ordered  - Administer antiarrhythmic and heart rate control medications as ordered  - Monitor electrolytes and administer replacement therapy as ordered  Outcome: Progressing     Problem: Prexisting or High Potential for Compromised Skin Integrity  Goal: Skin integrity is maintained or improved  Description: INTERVENTIONS:  - Identify patients at risk for skin breakdown  - Assess and monitor skin integrity  - Assess and monitor nutrition and hydration status  - Monitor labs   - Assess for incontinence   - Turn and reposition patient  - Assist with mobility/ambulation  - Relieve pressure over bony prominences  - Avoid friction and shearing  - Provide appropriate hygiene as needed including keeping skin clean and dry  - Evaluate need for skin moisturizer/barrier cream  - Collaborate with interdisciplinary team   - Patient/family teaching  - Consider wound care consult   Outcome: Progressing

## 2024-12-13 NOTE — ASSESSMENT & PLAN NOTE
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  patient states history of palpitations which at times will make her feel she is going to pass out  will hold Toprol-XL 25 mg at this time  continue monitor telemetry for arrhythmia  telemetry overnight 12/9 through 12/10 noted episodes of tachyarrhythmia up to 150 bpm and also periods of profound bradycardia in the 30s, no pauses noted  12/10/2024 TTE: EF visibly estimated at 60% with normal wall motion, left atrium is normal in size  overnight 12/10 to 12/11 no further bradycardia, but patient with episodes of SVT  12/12/2024 patient had implantation of Medtronic dual chambered pacemaker  post procedure chest x-rays without pneumothorax and leads appear to be stable and remain in original place  patient completing antibiotics

## 2024-12-13 NOTE — ASSESSMENT & PLAN NOTE
Held beta-blocker initially due to tachybradycardia syndrome  Resumed metoprolol post pacemaker placement with improvement

## 2024-12-13 NOTE — NURSING NOTE
Pt discharged to home. Pt daughter at bed side to assist with discharge and transport. Pt IV removed with no IV related complications. Pt discharge summary reviewed and all questions answered by staff nurse. Pt escorted off the unit and out of building by staff.

## 2024-12-13 NOTE — DISCHARGE SUMMARY
Discharge Summary - Hospitalist   Name: Blank Mensah 89 y.o. female I MRN: [id number]  Unit/Bed#: [address] [place] [address] Date of Admission: 12/9/2024   Date of Service: 12/13/2024 I Hospital Day: 3     Assessment & Plan  Syncope (Resolved: 12/13/2024)   Patient with history of hypertension, SVT presenting with palpitations and a syncopal episode at home while sitting in a chair.  Orthostasis negative  Telemetry revealing evidence of tachybradycardia syndrome  2D echo EF 60%, normal systolic and diastolic function.  No significant valvular abnormality  No further episodes, denies any chest pain, shortness of breath  Cardiology following, input appreciated  Status post pacemaker placement 12/12  Doing well, minimal pain at pacemaker site controlled with Tylenol  Postprocedure chest x-ray without any acute abnormality  Cardiology follow-up appreciated, patient was cleared to discharge with outpatient follow-up  Pacemaker precautions  Pain control as needed with Tylenol  Cardiology follow-up after discharge as scheduled  Essential hypertension  Blood pressure elevated in the emergency room.  Now improved   Norvasc increased to 5 mg daily.    Resumed Toprol-XL post Pacemaker placement  Paroxysmal SVT (supraventricular tachycardia) (HCC)  Held beta-blocker initially due to tachybradycardia syndrome  Resumed metoprolol post pacemaker placement with improvement    Anemia  -Hemoglobin 12.8, macrocytic  Hemoglobin stable at 11  Iron studies iron sat 28%, folate normal, B12 low normal  Initiated B12 replacement.  Discussed with patient and family, family to  over-the-counter B12 supplement not contain red dye due to history of allergy  Follow-up with hematology  Hyperlipidemia  Continue statin therapy.  Hypothyroidism  TSH performed in the emergency room is 0.528.  Continue levothyroxine 75 mcg daily.  Disorder of electrolytes (Resolved: 12/13/2024)  Hypomagnesemia-repleted, normalized  Essential thrombocytosis (HCC)  On  "Hydrea     Medical Problems       Resolved Problems  Date Reviewed: 11/26/2024   None       Discharging Physician / Practitioner: Shyann Peterson MD  PCP: Andreia Barba DO  Admission Date:   Admission Orders (From admission, onward)       Ordered        12/10/24 1626  INPATIENT ADMISSION  Once            12/09/24 1403  Place in Observation  Once                          Discharge Date: 12/13/24    Consultations During Hospital Stay:  Cardiology    Procedures Performed:   Pacemaker placement    Significant Findings / Test Results:   Procedure(s):  Cardiac pacer implant     Incidental Findings:   None    Test Results Pending at Discharge (will require follow up):   None     Outpatient Tests Requested:  None    Complications:  None    Reason for Admission: Syncope/palpitation    Hospital Course:   Blank Mensah is a 89 y.o. female patient with history of hypertension, SVT, GERD, hypothyroidism, vertigo, essential thrombocythemia who originally presented to the hospital on 12/9/2024 due to palpitation and syncopal episode.  As per HPI patient was sitting in the chair when she suddenly felt dizzy \"saw stars\" and subsequently passed out.  Patient was able to quickly regain consciousness without any confusion.  Patient also reported symptoms of palpitation intermittently at home.  Patient was evaluated in ED noted to have evidence of sinus arrhythmia PACs and PAT in ED.  Patient was subsequently admitted for observation.  Patient was monitored on telemetry, noted to have evidence of bradycardia ranging to 30s to 60s on home beta-blocker for PSVT's.  Beta-blockers were discontinued and patient was monitored.  Patient hemodynamically remained stable without any syncopal episode but noted to have episodes of PSVT's of beta-blocker.  Echocardiogram was done which did not show any changes from prior noted to have normal EF with normal systolic and diastolic function without any major valvular abnormalities.  Given " tachybradycardia syndrome and syncopal episode, pacemaker placement was recommended by cardiology.  Patient underwent successful pacemaker placement on 12/12/2024.  Postoperatively patient remained stable, x-ray did not reveal any postprocedure complication.  Patient did have mild pain associated with the pacemaker placement.  Pacemaker parameters were acceptable, patient was monitored on telemetry and metoprolol was resumed after pacemaker placement.  Patient remained stable without any further episodes of arrhythmia, patient was cleared by cardiology..  Patient was also noted to have mild macrocytic anemia, workup was done noted to have low normal B12 patient was started on a B12 supplement.  Patient currently remains clinically stable and will be discharged home with the family and will follow-up with cardiology as outpatient.  Patient was also advised to follow-up with PCP and hematology after discharge      Please see above list of diagnoses and related plan for additional information.     Condition at Discharge: stable    Discharge Day Visit / Exam:   Subjective:    Feels well, reports minimal problem discomfort at pacemaker site  Denies any chest pain, shortness of breath, dizziness  Neck discomfort improved  Tolerating diet well, passing flatus  Remains constipated requesting Dulcolax which she usually takes    Vitals: Blood Pressure: 125/59 (12/13/24 0300)  Pulse: 68 (12/13/24 0300)  Temperature: 97.8 °F (36.6 °C) (12/13/24 0300)  Temp Source: Temporal (12/13/24 0300)  Respirations: 18 (12/13/24 0300)  Height: 5' (152.4 cm) (12/10/24 1105)  Weight - Scale: 55.6 kg (122 lb 9.2 oz) (12/13/24 0600)  SpO2: 95 % (12/13/24 0300)  Physical Exam  Constitutional:       General: She is not in acute distress.  HENT:      Head: Normocephalic and atraumatic.   Cardiovascular:      Rate and Rhythm: Normal rate.      Comments: Left infraclavicular pacemaker dressing in place  Pulmonary:      Effort: Pulmonary effort is  normal. No respiratory distress.      Breath sounds: Normal breath sounds. No wheezing or rales.   Abdominal:      General: Bowel sounds are normal. There is no distension.      Palpations: Abdomen is soft.      Tenderness: There is no abdominal tenderness. There is no guarding or rebound.   Musculoskeletal:      Cervical back: Neck supple. No rigidity or tenderness.      Right lower leg: No edema.      Left lower leg: No edema.   Skin:     General: Skin is warm and dry.      Findings: No rash.   Neurological:      Mental Status: She is alert. Mental status is at baseline.      Cranial Nerves: No cranial nerve deficit.            Discussion with Family: Updated  (daughter) at bedside.    Discharge instructions/Information to patient and family:   See after visit summary for information provided to patient and family.      Provisions for Follow-Up Care:  See after visit summary for information related to follow-up care and any pertinent home health orders.      Mobility at time of Discharge:   Basic Mobility Inpatient Raw Score: 18  JH-HLM Goal: 6: Walk 10 steps or more  JH-HLM Achieved: 2: Bed activities/Dependent transfer (s/p pacemaker)  HLM Goal achieved. Continue to encourage appropriate mobility.     Disposition:   Home    Planned Readmission: None    Discharge Medications:  See after visit summary for reconciled discharge medications provided to patient and/or family.      Administrative Statements   Discharge Statement:  I have spent a total time of 35 minutes in caring for this patient on the day of the visit/encounter. >30 minutes of time was spent on: Impressions, Counseling / Coordination of care, Documenting in the medical record, Reviewing / ordering tests, medicine, procedures  , and Communicating with other healthcare professionals .    **Please Note: This note may have been constructed using a voice recognition system**

## 2024-12-13 NOTE — ASSESSMENT & PLAN NOTE
patient with episodes of SVT off beta-blocker  now that patient has pacemaker will resume Toprol-XL 25 mg daily

## 2024-12-13 NOTE — CASE MANAGEMENT
Case Management Discharge Planning Note    Patient name Blank Mensah  Location [address] [address]/[address] MRN [id number]  : 3/16/1934 Date 2024       Current Admission Date: 2024  Current Admission Diagnosis:Syncope   Patient Active Problem List    Diagnosis Date Noted Date Diagnosed    Disorder of electrolytes 2024     Anemia 2024     Syncope 2024     Essential thrombocytosis (HCC) 2024     Hyperlipidemia 02/10/2021     Gastroesophageal reflux disease without esophagitis 02/10/2021     Bilateral carotid artery stenosis 10/16/2020     Leukocytosis 10/05/2020     TIA (transient ischemic attack) 10/04/2020     Trouble breathing      Paroxysmal SVT (supraventricular tachycardia) (HCC) 2018     Hypothyroidism      Essential hypertension      Anxiety        LOS (days): 3  Geometric Mean LOS (GMLOS) (days): 2.3  Days to GMLOS:-0.5     OBJECTIVE:  Risk of Unplanned Readmission Score: 11.64         Current admission status: Inpatient   Preferred Pharmacy:   [facility] [address] [place] [address] - [address]  [address]  [facility] [address]  Phone: [phone] Fax: [phone]    Primary Care Provider: Andreia Barba DO    Primary Insurance: MEDICARE  Secondary Insurance: [employer]    DISCHARGE DETAILS:    Discharge planning discussed with:: Patient and daughter Padmini  Freedom of Choice: Yes    Comments - Freedom of Choice: SW spoke with patient and daughter at bedside and preference is still for discharge to home.  Both denied any needs for discharge and Padmini will transport her home.      CM contacted family/caregiver?: Yes  Were Treatment Team discharge recommendations reviewed with patient/caregiver?: Yes  Did patient/caregiver verbalize understanding of patient care needs?: Yes  Were patient/caregiver advised of the risks associated with not following Treatment Team discharge recommendations?: Yes    Contacts  Patient Contacts: Padmini Norman (daughter)  Relationship  to Patient:: Family  Contact Method: In Person  Reason/Outcome: Emergency Contact, Discharge Planning    Requested Home Health Care         Is the patient interested in HHC at discharge?: No    DME Referral Provided  Referral made for DME?: No    Other Referral/Resources/Interventions Provided:  Interventions: None Indicated    Would you like to participate in our [facility] service program?  : No - Declined    Treatment Team Recommendation: Home  Discharge Destination Plan:: Home  Transport at Discharge : Family         IMM Given (Date):: 12/13/24  IMM Given to:: Family (IMM reviewed with patient and daughter Padmini and signed by daughter.  Copy given to patient and copy placed in scan bin for chart.)

## 2024-12-13 NOTE — PROGRESS NOTES
Progress Note - Cardiology   Name: Blank Mensah 89 y.o. female I MRN: [id number]  Unit/Bed#: [address] [place] [address] Date of Admission: 12/9/2024   Date of Service: 12/13/2024 I Hospital Day: 3    Assessment & Plan  Syncope  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  patient states history of palpitations which at times will make her feel she is going to pass out  will hold Toprol-XL 25 mg at this time  continue monitor telemetry for arrhythmia  telemetry overnight 12/9 through 12/10 noted episodes of tachyarrhythmia up to 150 bpm and also periods of profound bradycardia in the 30s, no pauses noted  12/10/2024 TTE: EF visibly estimated at 60% with normal wall motion, left atrium is normal in size  overnight 12/10 to 12/11 no further bradycardia, but patient with episodes of SVT  12/12/2024 patient had implantation of Medtronic dual chambered pacemaker  post procedure chest x-rays without pneumothorax and leads appear to be stable and remain in original place  patient completing antibiotics  Essential hypertension  blood pressure 189/79 in the emergency room  will increase Norvasc to 5 mg daily and give extra Norvasc 2.5 mg at this time  continue to monitor and adjust medications as needed  Paroxysmal SVT (supraventricular tachycardia) (HCC)  patient with episodes of SVT off beta-blocker  now that patient has pacemaker will resume Toprol-XL 25 mg daily    Subjective   Chief Complaint: no complaints offered      Objective :  Temp:  [97.6 °F (36.4 °C)-97.9 °F (36.6 °C)] 97.8 °F (36.6 °C)  HR:  [68-89] 68  BP: (118-160)/(56-83) 125/59  Resp:  [16-18] 18  SpO2:  [94 %-96 %] 95 %  O2 Device: None (Room air)  Orthostatic Blood Pressures      Flowsheet Row Most Recent Value   Blood Pressure 125/59 filed at 12/13/2024 0300   Patient Position - Orthostatic VS Lying filed at 12/13/2024 0300          First Weight: Weight - Scale: 55.8 kg (123 lb) (12/09/24 1009)  Vitals:    12/12/24 0551 12/13/24 0600   Weight: 55.8  kg (123 lb) 55.6 kg (122 lb 9.2 oz)     Physical Exam  Vitals reviewed.   Constitutional:       General: She is not in acute distress.     Appearance: Normal appearance. She is normal weight.   HENT:      Right Ear: External ear normal.      Left Ear: External ear normal.   Eyes:      General: No scleral icterus.        Right eye: No discharge.         Left eye: No discharge.   Cardiovascular:      Rate and Rhythm: Normal rate and regular rhythm.      Pulses: Normal pulses.      Heart sounds: Normal heart sounds.      Comments: Dressing removed from left anterior chest wall. Aqua cell dressing in place, no hematoma or bleeding noted.  Pulmonary:      Effort: Pulmonary effort is normal. No respiratory distress.      Breath sounds: Normal breath sounds.   Abdominal:      General: Bowel sounds are normal. There is no distension.      Tenderness: There is no abdominal tenderness (.me).   Musculoskeletal:      Right lower leg: No edema.      Left lower leg: No edema.   Skin:     General: Skin is warm and dry.      Capillary Refill: Capillary refill takes less than 2 seconds.   Neurological:      General: No focal deficit present.      Mental Status: She is alert and oriented to person, place, and time. Mental status is at baseline.   Psychiatric:         Mood and Affect: Mood normal.           Lab Results: I have reviewed the following results:  Results from last 7 days   Lab Units 12/13/24  0546 12/12/24 0455 12/11/24  0533   WBC Thousand/uL 6.00 6.37 5.39   HEMOGLOBIN g/dL 11.5 11.0* 10.9*   HEMATOCRIT % 35.5 33.9* 33.4*   PLATELETS Thousands/uL 263 269 257     Results from last 7 days   Lab Units 12/13/24  0546 12/12/24 0455 12/11/24  0533   POTASSIUM mmol/L 4.0 4.0 3.8   CHLORIDE mmol/L 104 103 104   CO2 mmol/L 29 27 27   BUN mg/dL 19 23 20   CREATININE mg/dL 0.75 0.75 0.81   CALCIUM mg/dL 9.1 8.6 8.7         Lab Results   Component Value Date    HGBA1C 5.5 10/05/2020     Lab Results   Component Value Date     TROPONINI <0.02 10/04/2020           VTE Pharmacologic Prophylaxis: VTE covered by:  heparin (porcine), Subcutaneous, 5,000 Units at 12/13/24 0846     VTE Mechanical Prophylaxis: sequential compression device    Lorraine NEAL  Cardiology

## 2024-12-16 ENCOUNTER — TRANSITIONAL CARE MANAGEMENT (OUTPATIENT)
Dept: FAMILY MEDICINE CLINIC | Facility: CLINIC | Age: 89
End: 2024-12-16

## 2024-12-17 ENCOUNTER — OFFICE VISIT (OUTPATIENT)
Dept: CARDIOLOGY CLINIC | Facility: CLINIC | Age: 89
End: 2024-12-17
Payer: MEDICARE

## 2024-12-17 VITALS
SYSTOLIC BLOOD PRESSURE: 138 MMHG | OXYGEN SATURATION: 98 % | DIASTOLIC BLOOD PRESSURE: 84 MMHG | HEART RATE: 64 BPM | WEIGHT: 124 LBS | HEIGHT: 60 IN | BODY MASS INDEX: 24.35 KG/M2

## 2024-12-17 DIAGNOSIS — I47.10 PAROXYSMAL SVT (SUPRAVENTRICULAR TACHYCARDIA) (HCC): ICD-10-CM

## 2024-12-17 DIAGNOSIS — Z95.0 S/P PLACEMENT OF CARDIAC PACEMAKER: Primary | ICD-10-CM

## 2024-12-17 DIAGNOSIS — I10 ESSENTIAL HYPERTENSION: ICD-10-CM

## 2024-12-17 DIAGNOSIS — R42 LIGHTHEADEDNESS: ICD-10-CM

## 2024-12-17 PROCEDURE — 99214 OFFICE O/P EST MOD 30 MIN: CPT

## 2024-12-17 NOTE — PROGRESS NOTES
[facility]  General Cardiology Note  Blank Mensah  3/16/1934  [id number]      Referring Provider - No ref. provider found  Chief Complaint   Patient presents with    Follow-up     Site check       Assessment & Plan  S/P placement of cardiac pacemaker  Medtronic dual-chamber pacemaker insertion on 12/12/24  Dressing removed.  Steri strips in place. Site with very mild swelling and bruising. No signs of infection. Dry dressing+ Tegaderm placed.   Avoid directly exposing site to shower when bathing. Continue arm/lifting precautions  She has appointment next week with device clinic  Lightheadedness  Reports occasional lightheadedness. States it is not affected by position changes .  She does have history of vertigo and syncope  Reinforce hydration and increase physical activity  Continue to monitor BP and HR at home, continue fall precautions  Report to the office if symptoms do not improve  Paroxysmal SVT (supraventricular tachycardia) (HCC)  Patient with episodes of SVT off beta-blocker  ContinueToprol-XL 25 mg daily  Essential hypertension  Blood pressure 138/84 HR 64  Continue Norvasc to 5 mg daily (dose recently increased during recent admission)    Testing  - 12/10/2024 TTE: EF visibly estimated at 60% with normal wall motion, left atrium is normal in size      Will RTO in 1 month or sooner if necessary. Will call with any concerns.     Interval History: 89 y.o.  female  with PMH as below is here for site check  Patient of Dr. 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 successful Medtronic dual-chamber pacemaker insertion on 12/12/24.  continues on beta-blocker. Her device interrogation postplacement with normal functioning (per hospital note)     Today,  presents with daughter. Reports occasional  palpitations. Also has occasional lightheadedness. States it is not affected by position changes .  States she does not drink enough fluids, barely has 16 oz water daily.  She reports tiredness, she has been sedentary since pacemaker, she has people helping at home. Her daughter is going to try to have her move around more  She is compliant with her medications    Patient Active Problem List    Diagnosis Date Noted    S/P placement of cardiac pacemaker 12/17/2024    Anemia 12/11/2024    Essential thrombocytosis (HCC) 08/27/2024    Hyperlipidemia 02/10/2021    Gastroesophageal reflux disease without esophagitis 02/10/2021    Bilateral carotid artery stenosis 10/16/2020    Leukocytosis 10/05/2020    TIA (transient ischemic attack) 10/04/2020    Trouble breathing     Paroxysmal SVT (supraventricular tachycardia) (HCC) 05/07/2018    Hypothyroidism     Essential hypertension     Anxiety      Past Medical History:   Diagnosis Date    Anxiety     GERD (gastroesophageal reflux disease)     Hypertension     Hypothyroid     Vertigo      Social History     Tobacco Use    Smoking status: Former     Passive exposure: Never    Smokeless tobacco: Never   Vaping Use    Vaping status: Never Used   Substance Use Topics    Alcohol use: Not Currently    Drug use: No      Family History   Problem Relation Age of Onset    Thrombosis Mother     Cancer Father     Diabetes Neg Hx      Past Surgical History:   Procedure Laterality Date    BREAST BIOPSY Left 1974    CARDIAC ELECTROPHYSIOLOGY PROCEDURE Left 12/12/2024    Procedure: Cardiac pacer implant;  Surgeon: Ruma Emanuel MD;  Location: [facility];  Service: Cardiology    COLONOSCOPY  04/2018    HYSTERECTOMY         Current Outpatient Medications:     acetaminophen (TYLENOL) 325 mg tablet, Take 2 tablets (650 mg total) by mouth every 6 (six) hours as needed for mild pain, fever or headaches, Disp: , Rfl:     amLODIPine (NORVASC) 5 mg tablet, Take 1 tablet (5 mg total) by mouth daily, Disp: 30 tablet, Rfl: 5    Cholecalciferol 25 MCG (1000 UT) CHEW, Chew, Disp: , Rfl:      cyanocobalamin (VITAMIN B-12) 500 MCG tablet, Take 0.5 tablets (250 mcg total) by mouth daily, Disp: , Rfl:     hydroxyurea (HYDREA) 500 mg capsule, daily, Disp: , Rfl:     levothyroxine 75 mcg tablet, Take 1 tablet (75 mcg total) by mouth daily, Disp: 90 tablet, Rfl: 0    meclizine (ANTIVERT) 25 mg tablet, Take 25 mg by mouth 3 (three) times a day as needed for dizziness, Disp: , Rfl:     metoprolol succinate (TOPROL-XL) 25 mg 24 hr tablet, Take 1 tablet (25 mg total) by mouth 2 (two) times a day, Disp: 90 tablet, Rfl: 1    omeprazole (PriLOSEC) 20 mg delayed release capsule, Take 1 capsule (20 mg total) by mouth daily, Disp: 90 capsule, Rfl: 1    rosuvastatin (CRESTOR) 40 MG tablet, Take 1 tablet (40 mg total) by mouth every evening, Disp: 90 tablet, Rfl: 1    aspirin (ECOTRIN LOW STRENGTH) 81 mg EC tablet, Take 1 tablet (81 mg total) by mouth daily, Disp: 30 tablet, Rfl: 0    Allergies   Allergen Reactions    Iodinated Contrast Media     Dye Fdc Red [Red Dye - Food Allergy] Palpitations       Vitals:    12/17/24 1021   BP: 138/84   BP Location: Right arm   Patient Position: Sitting   Cuff Size: Large   Pulse: 64   SpO2: 98%   Weight: 56.2 kg (124 lb)   Height: 5' (1.524 m)        Vitals:    12/17/24 1021   Weight: 56.2 kg (124 lb)      Height: 5' (152.4 cm)   Body mass index is 24.22 kg/m².    Labs:   Lab Results   Component Value Date/Time    CHOLESTEROL 214 (H) 11/11/2020 09:48 AM    TRIG 73 11/11/2020 09:48 AM     11/11/2020 09:48 AM    LDLCALC 94 11/11/2020 09:48 AM      Lab Results   Component Value Date    SODIUM 136 12/13/2024    K 4.0 12/13/2024     12/13/2024    CREATININE 0.75 12/13/2024    EGFR 70 12/13/2024    BUN 19 12/13/2024    CO2 29 12/13/2024    ALT 8 12/10/2024    AST 16 12/10/2024    INR 0.99 06/29/2023    GLUF 88 11/11/2020    HGBA1C 5.5 10/05/2020    WBC 6.00 12/13/2024    HGB 11.5 12/13/2024    HCT 35.5 12/13/2024     12/13/2024         Imaging: XR chest pa and  lateral  Result Date: 12/13/2024  Narrative: XR CHEST PA AND LATERAL INDICATION: Post PPM. COMPARISON: Chest radiograph December 12, 2024 FINDINGS: Left chest wall intracardiac device with intact lead(s). No abandoned lead(s). Clear lungs. No pneumothorax or pleural effusion. Enlarged cardiac silhouette, unchanged. Bones are unremarkable for age. Normal upper abdomen.     Impression: No focal consolidation, pleural effusion, or pneumothorax. Workstation performed: [id number]     XR chest portable  Result Date: 12/12/2024  Narrative: XR CHEST PORTABLE INDICATION: Post PPM. COMPARISON: 12/9/2024 FINDINGS: Multi-lead left-sided cardiac conduction device. Clear lungs. No pneumothorax or pleural effusion. Normal cardiomediastinal silhouette. Bones are unremarkable for age. Normal upper abdomen.     Impression: No acute cardiopulmonary disease. Workstation performed: [id number]     Cardiac ep lab eps/ablations  Result Date: 12/12/2024  Narrative: Images from the original result were not included. Cardiac Pacemaker Placement Operative Report Blank Mensah [id number] 12/12/2024 @PCP@ Surgeon: Ruma Emanuel MD Procedure(s): Dual Chamber Permanent Pacemaker Implantation; Cardiac Fluoroscopy Indications: Tachycardia-bradycardia syndrome, asystolic pauses, sick sinus syndrome, Description of procedure:  The risks, benefits, complications,  alternative treatment options, and expected outcomes were discussed with the patient. The complications of infection, pneumothorax, cardiac perforation, cardiac tamponade, and even death, but not limited to it were discussed with patient the family, before the patient was brought to cath lab. Patient and family were explained about lead dislodgement and need for repeat procedures.  Patient and family were told that that is not an complication lead dislodgement can happen.  The patient and/or family concurred with the proposed plan, giving informed consent. Patient was prepped and draped in  the usual strict sterile fashion. Antibiotic infusion was started.  About  20 cc Sensorcaine was infiltrated into the area just medial to the left deltopectoral groove. Access was obtained in left subclavian vein with modified Seldinger technique with ultrasound guidance and a guidewire was retained.  Sheath was placed over this guidewire and a 2nd guidewire was was passed.  Sheath was removed and the two guidewires  were retained.  Then a new sheath was placed over one of  guidewire and a 2nd guidewire was retained.  Using a #15 scalpel, an incision was made. The incision was extended to the prepectoral fascia using blunt dissection as well as electrocautery. A small pocket was made and complete hemostasis was achieved. A antibiotic-soaked gauze was placed in the cavity.  Patient tolerated this procedure very well  A 7F sheath passed over first wire.  And 2nd wire was retained.  Guiding catheter was placed through the sheath over the wire.  With the help of J-tip wire guiding catheter was guided to the right ventricle.  Once it was noted to be in septal area close to bundle of his.  A HIS lead was placed to the guiding catheter while making sure that guiding catheter is RV septal area close to the bundle of HIS.  Thresholds were checked and once we were sure that we are close to to the fascicles lead was screwed in. Position of the lead was confirmed in both FALLON and KATHARINE views Appropriate sensing and thresholds were noted.  If all the numbers were acceptable the guiding catheter was carefully slit open and again the number were tested again.  Adequate sensing and capture threshold and lead impedance was conformed again.  Seven [place] sheath was carefully split open.  The lead was tied down to the prepectoral fascia and muscle.  Thresholds were tested multiple times.. Appropriate sensing and thresholds were obtained. No diaphragmatic pacing occurred at 10 V and 1.5 ms. If needed pacing was performed from this newly  "placed lead.   A second sheath was advanced over the second  guidewire. The dilator and guidewire were removed. A atrial pacemaker lead was advanced to the heart under fluoroscopic guidance. The lead was fixated to the right atrial appendage. Appropriate sensing and thresholds were obtained. No diaphragmatic pacing occurred at 10 V and 1.5 ms. The sheath was peeled away. The leads were then sutured to the pectoralis muscle using silk sutures. A second suture was placed around the lead sleeves. The leads were attached to the generator. The system was placed in the pocket in the antibiotic pouch.  Pocket was washed with large amount of antibiotic saline.  And hemostasis was achieved.  The pacemaker and leads system were visualized under fluoroscopy. Appropriate redundancy/slack in the leads were noted. The pins of the leads were beyond the set screws. Hemostasis was reverified. The pocket was then closed with 2 running layers of the dissolvable sutures . Steri-Strips, a gauze dressing, and a pressure dressing was applied at operative site.   Thereafter the device was interrogated and proper sensing and thresholds through the device were confirmed.  Pacemaker Data: Pacemaker is Medtronic dual-chamber pacemaker. Measured Data: Estimated Blood Loss:  Less than 5 cc      Complications: No complications Disposition: Patient was transferred to holding area in stable condition.        Condition: Stable Impression: Successful placement of dual chamber pacemaker without any complications. Plan see as ordered. Dr. Ruma Emanuel MD [facility] \"This note was completed in part utilizing m-modal fluency direct voice recognition software.   Grammatical errors, random word insertion, spelling mistakes, and incomplete sentences may be an occasional consequence of the system secondary to software limitations, ambient noise and hardware issues. Please read the chart carefully and recognize, using context, where substitutions have occurred. " " If you have any questions or concerns about the context, text or information contained within the body of this dictation, please contact myself, the provider, for further clarification.\"     Echo complete w/ contrast if indicated  Result Date: 12/10/2024  Narrative:   Left Ventricle: Left ventricular cavity size is normal. Wall thickness is mildly increased at intraventricular septum The left ventricular ejection fraction is 60% by visual estimation. Systolic function is normal. Wall motion is normal. Diastolic function is normal.   Left Atrium: The atrium is normal in size.   Aortic Valve: There is trace regurgitation.   IVC/SVC: The inferior vena cava is normal in size. No major change in LV/RV size and function     XR chest 1 view portable  Result Date: 12/9/2024  Narrative: XR CHEST PORTABLE INDICATION: Syncope. COMPARISON: 6/29/2023 FINDINGS: Clear lungs. No pneumothorax or pleural effusion. Normal cardiomediastinal silhouette. Bones are unremarkable for age. Normal upper abdomen.     Impression: No acute cardiopulmonary disease. Workstation performed: [id number]       Review of Systems   Constitutional: Negative for chills, diaphoresis, fever and malaise/fatigue.   Cardiovascular:  Positive for palpitations. Negative for chest pain, dyspnea on exertion, leg swelling, orthopnea and syncope.   Respiratory:  Negative for cough and shortness of breath.    Gastrointestinal:  Negative for abdominal pain, nausea and vomiting.   Neurological:  Positive for light-headedness. Negative for dizziness and headaches.   Psychiatric/Behavioral:  Negative for altered mental status.      Except as noted in HPI, is otherwise reviewed in detail and a 12 point review of systems is negative.    Physical Exam  Vitals reviewed.   Constitutional:       General: She is not in acute distress.     Appearance: Normal appearance. She is not diaphoretic.   HENT:      Head: Normocephalic and atraumatic.   Eyes:      General: No scleral " icterus.     Extraocular Movements: Extraocular movements intact.   Cardiovascular:      Rate and Rhythm: Normal rate and regular rhythm.      Pulses: Normal pulses.           Radial pulses are 2+ on the right side and 2+ on the left side.      Heart sounds: Normal heart sounds, S1 normal and S2 normal.   Pulmonary:      Effort: Pulmonary effort is normal. No tachypnea or respiratory distress.      Breath sounds: Normal breath sounds. No wheezing or rales.   Abdominal:      General: Bowel sounds are normal. There is no distension.      Palpations: Abdomen is soft.   Musculoskeletal:      Right lower leg: No edema.      Left lower leg: No edema.   Skin:     General: Skin is warm and dry.      Capillary Refill: Capillary refill takes less than 2 seconds.      Findings: Bruising (very mild bruising and swelling around pacemaker site) present.   Neurological:      Mental Status: She is alert and oriented to person, place, and time.      Gait: Gait abnormal.   Psychiatric:         Mood and Affect: Mood normal.         Behavior: Behavior normal.          **Please Note: This note may have been constructed using a voice recognition system**     Thank you for the opportunity to participate in the care of this patient.   ÁNGEL Brown

## 2024-12-17 NOTE — ASSESSMENT & PLAN NOTE
Blood pressure 138/84 HR 64  Continue Norvasc to 5 mg daily (dose recently increased during recent admission)

## 2024-12-17 NOTE — ASSESSMENT & PLAN NOTE
Medtronic dual-chamber pacemaker insertion on 12/12/24  Dressing removed.  Steri strips in place. Site with very mild swelling and bruising. No signs of infection. Dry dressing+ Tegaderm placed.   Avoid directly exposing site to shower when bathing. Continue arm/lifting precautions  She has appointment next week with device clinic

## 2024-12-18 ENCOUNTER — TELEPHONE (OUTPATIENT)
Dept: ADMINISTRATIVE | Facility: OTHER | Age: 89
End: 2024-12-18

## 2024-12-18 NOTE — TELEPHONE ENCOUNTER
12/18/24 2:23 PM    Patient contacted to bring Advance Directive, POLST, or Living Will document to next scheduled pcp visit.[facility] Department spoke with patient/ caregiver.    Thank you.  Bev Riggs MA  [facility]

## 2024-12-20 NOTE — ASSESSMENT & PLAN NOTE
-Hemoglobin 12.8, macrocytic  Hemoglobin stable at 11  Iron studies iron sat 28%, folate normal, B12 low normal  Initiated B12 replacement.  Discussed with patient and family, family to  over-the-counter B12 supplement not contain red dye due to history of allergy  Follow-up with hematology   n/a

## 2024-12-27 ENCOUNTER — OFFICE VISIT (OUTPATIENT)
Dept: FAMILY MEDICINE CLINIC | Facility: CLINIC | Age: 89
End: 2024-12-27
Payer: MEDICARE

## 2024-12-27 VITALS
SYSTOLIC BLOOD PRESSURE: 140 MMHG | TEMPERATURE: 97.8 F | HEART RATE: 68 BPM | BODY MASS INDEX: 25.01 KG/M2 | WEIGHT: 127.4 LBS | HEIGHT: 60 IN | DIASTOLIC BLOOD PRESSURE: 74 MMHG

## 2024-12-27 DIAGNOSIS — E83.42 HYPOMAGNESEMIA: ICD-10-CM

## 2024-12-27 DIAGNOSIS — E53.8 VITAMIN B12 DEFICIENCY: ICD-10-CM

## 2024-12-27 DIAGNOSIS — K21.9 GASTROESOPHAGEAL REFLUX DISEASE WITHOUT ESOPHAGITIS: ICD-10-CM

## 2024-12-27 DIAGNOSIS — I47.10 PAROXYSMAL SVT (SUPRAVENTRICULAR TACHYCARDIA) (HCC): ICD-10-CM

## 2024-12-27 DIAGNOSIS — I10 ESSENTIAL HYPERTENSION: ICD-10-CM

## 2024-12-27 DIAGNOSIS — Z95.0 S/P PLACEMENT OF CARDIAC PACEMAKER: Primary | ICD-10-CM

## 2024-12-27 PROCEDURE — 96372 THER/PROPH/DIAG INJ SC/IM: CPT

## 2024-12-27 PROCEDURE — 99495 TRANSJ CARE MGMT MOD F2F 14D: CPT | Performed by: FAMILY MEDICINE

## 2024-12-27 RX ORDER — CYANOCOBALAMIN 1000 UG/ML
1000 INJECTION, SOLUTION INTRAMUSCULAR; SUBCUTANEOUS ONCE
Status: COMPLETED | OUTPATIENT
Start: 2024-12-27 | End: 2024-12-27

## 2024-12-27 RX ADMIN — CYANOCOBALAMIN 1000 MCG: 1000 INJECTION, SOLUTION INTRAMUSCULAR; SUBCUTANEOUS at 14:19

## 2024-12-27 NOTE — PROGRESS NOTES
Transition of Care Visit  Name: Blank Mensah      : 3/16/1934      MRN: [id number]  Encounter Provider: Andreia Barba DO  Encounter Date: 2024   Encounter department: [facility]    Assessment & Plan  S/P placement of cardiac pacemaker  Healing well  Follow-up with cardiology       Essential hypertension  Blood pressure is well-controlled  Continue amlodipine 5 mg daily and metoprolol 25 mg daily  Orders:  •  CBC and differential; Future    Vitamin B12 deficiency  Level was low in the hospital  Will continue oral supplement  Orders:  •  cyanocobalamin injection 1,000 mcg  •  CBC and differential; Future  •  Vitamin B12; Future    Paroxysmal SVT (supraventricular tachycardia) (HCC)  Stable  Continue metoprolol 25 mg daily       Gastroesophageal reflux disease without esophagitis  Advised to take omeprazole every other day, may be causing low B12 and affecting her magnesium.  She did need a magnesium infusion in the hospital.       Hypomagnesemia  Recommend Calm Magnesium   Discussed that too much magnesium can cause diarrhea.  Orders:  •  Magnesium; Future     Return in about 3 months (around 3/27/2025) for Next scheduled follow up.    History of Present Illness     Transitional Care Management Review:   Blank Mensah is a 89 y.o. female here for TCM follow up.     During the TCM phone call patient stated:  TCM Call     Date and time call was made  2024  9:24 AM    Hospital care reviewed  Records reviewed    Patient was hospitialized at  [facility]    Date of Admission  24    Date of discharge  24    Diagnosis  Syncope    Disposition  Home    Were the patients medications reviewed and updated  No  Her daughter Tiffanie states Blank manages fine and they assist her but declined reviewing right now. They know to call if any questions    Current Symptoms  None      TCM Call     Post hospital issues  None    Should patient be enrolled in anticoag monitoring?  No    Scheduled  for follow up?  Yes    Patients specialists  Cardiologist; Other (comment)    Cardiologist name  Cem Stern MD (Cardiology) in 1 month (1/12/2025)    Other specialists names  Dr Rodriguez, oncology    Did you obtain your prescribed medications  Yes    Do you need help managing your prescriptions or medications  Yes    Is transportation to your appointment needed  Yes    Specify why  She does not drive    I have advised the patient to call PCP with any new or worsening symptoms  Deacon López    Living Arrangements  Alone    Support System  Family    The type of support provided  Physical; Emotional    Do you have social support  Yes, as much as I need    Are you recieving any outpatient services  No    Are you recieving home care services  No    Interperter language line needed  No    Counseling  Family    Counseling topics  Activities of daily living; Importance of RX compliance; patient and family education; instructions for management; Risk factor reduction    Comments  I spoke with Blank's daughter Tiffanie who states that she is doing fine. She is now eating again, after a few days of no appetite. She knows to take her to the ER if any chest pain, dypsnea and to report to cardiology any fevers, s/s of surgical site infection etc Deacon Campoverde        Patient reports she is feeling much better since her recent hospital stay.  She been feeling dizzy and tired prior to her admission.  She had a pacemaker placed.  After few days she started feeling significantly better.    She has been taking vitamin B12 since she got home.      Review of Systems  Objective   /74   Pulse 68   Temp 97.8 °F (36.6 °C)   Ht 5' (1.524 m)   Wt 57.8 kg (127 lb 6.4 oz)   BMI 24.88 kg/m²     Physical Exam  Vitals and nursing note reviewed.   Constitutional:       General: She is not in acute distress.     Appearance: She is well-developed.   HENT:      Head: Normocephalic and atraumatic.      Right Ear: Tympanic membrane normal.       Left Ear: Tympanic membrane normal.   Cardiovascular:      Rate and Rhythm: Normal rate and regular rhythm.      Heart sounds: No murmur heard.  Pulmonary:      Effort: Pulmonary effort is normal. No respiratory distress.      Breath sounds: Normal breath sounds.   Musculoskeletal:      Cervical back: Neck supple.   Neurological:      Mental Status: She is alert.       Medications have been reviewed by provider in current encounter

## 2024-12-27 NOTE — ASSESSMENT & PLAN NOTE
Advised to take omeprazole every other day, may be causing low B12 and affecting her magnesium.  She did need a magnesium infusion in the hospital.

## 2024-12-27 NOTE — ASSESSMENT & PLAN NOTE
Blood pressure is well-controlled  Continue amlodipine 5 mg daily and metoprolol 25 mg daily  Orders:  •  CBC and differential; Future

## 2024-12-27 NOTE — ASSESSMENT & PLAN NOTE
Level was low in the hospital  Will continue oral supplement  Orders:  •  cyanocobalamin injection 1,000 mcg  •  CBC and differential; Future  •  Vitamin B12; Future

## 2025-01-06 ENCOUNTER — NURSE TRIAGE (OUTPATIENT)
Age: OVER 89
End: 2025-01-06

## 2025-01-06 NOTE — TELEPHONE ENCOUNTER
Regarding: Medication Dose Confirmation  ----- Message from Beena BREWSTER sent at 1/6/2025  9:43 AM EST -----  Good morning,    Tiffanie, patient's daughter, saw that patient's amlodipine was increased to 5 mg when she was originally taking 2.5 mg. Tiffanie wanted to confirm if this is correct or if Lorraine King made a mistake in reordering the prescription.

## 2025-01-06 NOTE — TELEPHONE ENCOUNTER
Called and Spoke to Tiffanie, who is on the communication form, went over the last office visit note, advised to continue. Verbally understood   Answer Assessment - Initial Assessment Questions  Information only    Protocols used: Information Only Call - No Triage-Adult-[place]

## 2025-01-20 ENCOUNTER — RESULTS FOLLOW-UP (OUTPATIENT)
Dept: CARDIOLOGY CLINIC | Facility: CLINIC | Age: OVER 89
End: 2025-01-20

## 2025-01-20 NOTE — RESULT ENCOUNTER NOTE
Patient's device interrogation reading shows,  device function is normal.  Patient should keep appointment with cardiology. Patient need to seen in office at some point with any one      Please call patient.

## 2025-01-27 ENCOUNTER — IN-CLINIC DEVICE VISIT (OUTPATIENT)
Dept: CARDIOLOGY CLINIC | Facility: CLINIC | Age: OVER 89
End: 2025-01-27

## 2025-01-27 DIAGNOSIS — Z95.0 CARDIAC PACEMAKER IN SITU: Primary | ICD-10-CM

## 2025-01-27 PROCEDURE — 99024 POSTOP FOLLOW-UP VISIT: CPT | Performed by: INTERNAL MEDICINE

## 2025-01-27 NOTE — PROGRESS NOTES
Results for orders placed or performed in visit on 01/27/25   Cardiac EP device report    Narrative    MDT DC PPM (MVP ON) - ACTIVE SYSTEM IS MRI CONDITIONAL  DEVICE INTERROGATED IN THE [place] OFFICE: BATTERY VOLTAGE ADEQUATE-12 YRS. AP 80%  0%. ALL AVAILABLE LEAD PARAMETERS WITHIN NORMAL LIMITS. 2 VHRS & 1 FAST A/V NOTED. AVAIL EGRAMS PRESENT AS SVT. PT ON METO SUCC & EF 60% (ECHO 2024). NO PROGRAMMING CHANGES MADE TO DEVICE PARAMETERS. NORMAL DEVICE FUNCTION. NC

## 2025-01-29 ENCOUNTER — RESULTS FOLLOW-UP (OUTPATIENT)
Dept: CARDIOLOGY CLINIC | Facility: CLINIC | Age: OVER 89
End: 2025-01-29

## 2025-02-05 ENCOUNTER — TELEPHONE (OUTPATIENT)
Dept: CARDIOLOGY CLINIC | Facility: CLINIC | Age: OVER 89
End: 2025-02-05

## 2025-02-05 ENCOUNTER — RESULTS FOLLOW-UP (OUTPATIENT)
Dept: CARDIOLOGY CLINIC | Facility: CLINIC | Age: OVER 89
End: 2025-02-05

## 2025-02-05 ENCOUNTER — HOSPITAL ENCOUNTER (EMERGENCY)
Facility: HOSPITAL | Age: OVER 89
Discharge: HOME | End: 2025-02-05
Attending: EMERGENCY MEDICINE
Payer: MEDICARE

## 2025-02-05 ENCOUNTER — APPOINTMENT (EMERGENCY)
Dept: RADIOLOGY | Facility: HOSPITAL | Age: OVER 89
End: 2025-02-05
Payer: MEDICARE

## 2025-02-05 VITALS
TEMPERATURE: 97 F | RESPIRATION RATE: 20 BRPM | SYSTOLIC BLOOD PRESSURE: 126 MMHG | DIASTOLIC BLOOD PRESSURE: 78 MMHG | OXYGEN SATURATION: 99 % | WEIGHT: 124.8 LBS | HEART RATE: 60 BPM | BODY MASS INDEX: 24.37 KG/M2

## 2025-02-05 DIAGNOSIS — I48.91 ATRIAL FIBRILLATION WITH RAPID VENTRICULAR RESPONSE (HCC): Primary | ICD-10-CM

## 2025-02-05 LAB
2HR DELTA HS TROPONIN: -1 NG/L
ALBUMIN SERPL BCG-MCNC: 4.1 G/DL (ref 3.5–5)
ALP SERPL-CCNC: 75 U/L (ref 34–104)
ALT SERPL W P-5'-P-CCNC: 13 U/L (ref 7–52)
ANION GAP SERPL CALCULATED.3IONS-SCNC: 11 MMOL/L (ref 4–13)
APTT PPP: 29 SECONDS (ref 23–34)
AST SERPL W P-5'-P-CCNC: 23 U/L (ref 13–39)
ATRIAL RATE: 62 BPM
BASOPHILS # BLD AUTO: 0.03 THOUSANDS/ΜL (ref 0–0.1)
BASOPHILS NFR BLD AUTO: 1 % (ref 0–1)
BILIRUB SERPL-MCNC: 0.31 MG/DL (ref 0.2–1)
BUN SERPL-MCNC: 20 MG/DL (ref 5–25)
CALCIUM SERPL-MCNC: 9.2 MG/DL (ref 8.4–10.2)
CARDIAC TROPONIN I PNL SERPL HS: 4 NG/L (ref ?–50)
CARDIAC TROPONIN I PNL SERPL HS: 5 NG/L (ref ?–50)
CHLORIDE SERPL-SCNC: 103 MMOL/L (ref 96–108)
CO2 SERPL-SCNC: 23 MMOL/L (ref 21–32)
CREAT SERPL-MCNC: 0.87 MG/DL (ref 0.6–1.3)
EOSINOPHIL # BLD AUTO: 0.09 THOUSAND/ΜL (ref 0–0.61)
EOSINOPHIL NFR BLD AUTO: 1 % (ref 0–6)
ERYTHROCYTE [DISTWIDTH] IN BLOOD BY AUTOMATED COUNT: 13.9 % (ref 11.6–15.1)
GFR SERPL CREATININE-BSD FRML MDRD: 58 ML/MIN/1.73SQ M
GLUCOSE SERPL-MCNC: 94 MG/DL (ref 65–140)
HCT VFR BLD AUTO: 38.9 % (ref 34.8–46.1)
HGB BLD-MCNC: 12.5 G/DL (ref 11.5–15.4)
IMM GRANULOCYTES # BLD AUTO: 0.02 THOUSAND/UL (ref 0–0.2)
IMM GRANULOCYTES NFR BLD AUTO: 0 % (ref 0–2)
INR PPP: 1.02 (ref 0.85–1.19)
LYMPHOCYTES # BLD AUTO: 1.41 THOUSANDS/ΜL (ref 0.6–4.47)
LYMPHOCYTES NFR BLD AUTO: 23 % (ref 14–44)
MAGNESIUM SERPL-MCNC: 1.9 MG/DL (ref 1.9–2.7)
MCH RBC QN AUTO: 38.6 PG (ref 26.8–34.3)
MCHC RBC AUTO-ENTMCNC: 32.1 G/DL (ref 31.4–37.4)
MCV RBC AUTO: 120 FL (ref 82–98)
MONOCYTES # BLD AUTO: 0.61 THOUSAND/ΜL (ref 0.17–1.22)
MONOCYTES NFR BLD AUTO: 10 % (ref 4–12)
NEUTROPHILS # BLD AUTO: 4.08 THOUSANDS/ΜL (ref 1.85–7.62)
NEUTS SEG NFR BLD AUTO: 65 % (ref 43–75)
NRBC BLD AUTO-RTO: 0 /100 WBCS
P AXIS: 87 DEGREES
PLATELET # BLD AUTO: 370 THOUSANDS/UL (ref 149–390)
PMV BLD AUTO: 10.4 FL (ref 8.9–12.7)
POTASSIUM SERPL-SCNC: 4.1 MMOL/L (ref 3.5–5.3)
PR INTERVAL: 134 MS
PROT SERPL-MCNC: 7.3 G/DL (ref 6.4–8.4)
PROTHROMBIN TIME: 13.9 SECONDS (ref 12.3–15)
QRS AXIS: 28 DEGREES
QRS AXIS: 47 DEGREES
QRSD INTERVAL: 66 MS
QRSD INTERVAL: 72 MS
QT INTERVAL: 274 MS
QT INTERVAL: 400 MS
QTC INTERVAL: 384 MS
QTC INTERVAL: 406 MS
RBC # BLD AUTO: 3.24 MILLION/UL (ref 3.81–5.12)
SODIUM SERPL-SCNC: 137 MMOL/L (ref 135–147)
T WAVE AXIS: 34 DEGREES
T WAVE AXIS: 52 DEGREES
VENTRICULAR RATE: 118 BPM
VENTRICULAR RATE: 62 BPM
WBC # BLD AUTO: 6.24 THOUSAND/UL (ref 4.31–10.16)

## 2025-02-05 PROCEDURE — 99285 EMERGENCY DEPT VISIT HI MDM: CPT

## 2025-02-05 PROCEDURE — 96374 THER/PROPH/DIAG INJ IV PUSH: CPT

## 2025-02-05 PROCEDURE — 85730 THROMBOPLASTIN TIME PARTIAL: CPT | Performed by: EMERGENCY MEDICINE

## 2025-02-05 PROCEDURE — 85610 PROTHROMBIN TIME: CPT | Performed by: EMERGENCY MEDICINE

## 2025-02-05 PROCEDURE — 84484 ASSAY OF TROPONIN QUANT: CPT | Performed by: EMERGENCY MEDICINE

## 2025-02-05 PROCEDURE — 80053 COMPREHEN METABOLIC PANEL: CPT | Performed by: EMERGENCY MEDICINE

## 2025-02-05 PROCEDURE — 99285 EMERGENCY DEPT VISIT HI MDM: CPT | Performed by: EMERGENCY MEDICINE

## 2025-02-05 PROCEDURE — 93010 ELECTROCARDIOGRAM REPORT: CPT | Performed by: INTERNAL MEDICINE

## 2025-02-05 PROCEDURE — 83735 ASSAY OF MAGNESIUM: CPT | Performed by: EMERGENCY MEDICINE

## 2025-02-05 PROCEDURE — 71045 X-RAY EXAM CHEST 1 VIEW: CPT

## 2025-02-05 PROCEDURE — 85025 COMPLETE CBC W/AUTO DIFF WBC: CPT | Performed by: EMERGENCY MEDICINE

## 2025-02-05 PROCEDURE — 96361 HYDRATE IV INFUSION ADD-ON: CPT

## 2025-02-05 PROCEDURE — 36415 COLL VENOUS BLD VENIPUNCTURE: CPT | Performed by: EMERGENCY MEDICINE

## 2025-02-05 PROCEDURE — 93005 ELECTROCARDIOGRAM TRACING: CPT

## 2025-02-05 RX ORDER — METOPROLOL TARTRATE 1 MG/ML
5 INJECTION, SOLUTION INTRAVENOUS ONCE
Status: COMPLETED | OUTPATIENT
Start: 2025-02-05 | End: 2025-02-05

## 2025-02-05 RX ADMIN — SODIUM CHLORIDE 500 ML: 0.9 INJECTION, SOLUTION INTRAVENOUS at 10:42

## 2025-02-05 RX ADMIN — APIXABAN 2.5 MG: 2.5 TABLET, FILM COATED ORAL at 13:10

## 2025-02-05 RX ADMIN — METOPROLOL TARTRATE 5 MG: 5 INJECTION INTRAVENOUS at 10:44

## 2025-02-05 NOTE — ED PROVIDER NOTES
Time reflects when diagnosis was documented in both MDM as applicable and the Disposition within this note       Time User Action Codes Description Comment    2/5/2025  1:28 PM Keshawn Shah Add [I48.91] Atrial fibrillation with rapid ventricular response (HCC)           ED Disposition       ED Disposition   Discharge    Condition   Stable    Date/Time   Wed Feb 5, 2025  1:28 PM    Comment   Blank Mensah discharge to home/self care.                   Assessment & Plan       Medical Decision Making  Pulse ox 99% on room air indicating adequate oxygenation.  CXR: NAD as read by me      Differential diagnose include but not limited to arrhythmia, ACS, electrolyte abnormality    Patient had taking p.o. dose of metoprolol this morning will give additional doses IV.  Additional metoprolol doses IV resulted in good rate control and patient converted back to atrial paced rhythm.  Cardiology on-call Dr. Stern contacted and case discussed.  Recommend starting patient on Eliquis and follow-up in the office.  Patient has follow-up scheduled on Tuesday.    Amount and/or Complexity of Data Reviewed  Labs: ordered.  Radiology: ordered and independent interpretation performed.  ECG/medicine tests: ordered and independent interpretation performed.    Risk  Prescription drug management.             Medications   sodium chloride 0.9 % bolus 500 mL (0 mL Intravenous Stopped 2/5/25 1257)   metoprolol (LOPRESSOR) injection 5 mg (5 mg Intravenous Given 2/5/25 1044)   apixaban (ELIQUIS) tablet 2.5 mg (2.5 mg Oral Given 2/5/25 1310)       ED Risk Strat Scores   HEART Risk Score      Flowsheet Row Most Recent Value   Heart Score Risk Calculator    History 0 Filed at: 02/05/2025 1622   ECG 1 Filed at: 02/05/2025 1622   Age 2 Filed at: 02/05/2025 1622   Risk Factors 2 Filed at: 02/05/2025 1622   Troponin 0 Filed at: 02/05/2025 1622   HEART Score 5 Filed at: 02/05/2025 1622          HEART Risk Score      Flowsheet Row Most Recent Value    Heart Score Risk Calculator    History 0 Filed at: 02/05/2025 1622   ECG 1 Filed at: 02/05/2025 1622   Age 2 Filed at: 02/05/2025 1622   Risk Factors 2 Filed at: 02/05/2025 1622   Troponin 0 Filed at: 02/05/2025 1622   HEART Score 5 Filed at: 02/05/2025 1622            [id number] SCORE      Flowsheet Row Most Recent Value   [id number]    Age 2 Filed at: 02/05/2025 1239   Sex 1 Filed at: 02/05/2025 1239   CHF History 0 Filed at: 02/05/2025 1239   HTN History 1 Filed at: 02/05/2025 1239   Stroke or TIA Symptoms Previously 2 Filed at: 02/05/2025 1239   Vascular Disease History 0 Filed at: 02/05/2025 1239   Diabetes History 0 Filed at: 02/05/2025 1239   [id number] Score 6 Filed at: 02/05/2025 1239                                            History of Present Illness       Chief Complaint   Patient presents with    Irregular Heart Beat     Patient c/o fluttering of her heart that started around 0400.  Directed to Er by Dr. Zapata.         Past Medical History:   Diagnosis Date    Anxiety     GERD (gastroesophageal reflux disease)     Hypertension     Hypothyroid     Vertigo       Past Surgical History:   Procedure Laterality Date    BREAST BIOPSY Left 1974    CARDIAC ELECTROPHYSIOLOGY PROCEDURE Left 12/12/2024    Procedure: Cardiac pacer implant;  Surgeon: Ruma Emanuel MD;  Location: [facility];  Service: Cardiology    COLONOSCOPY  04/2018    HYSTERECTOMY        Family History   Problem Relation Age of Onset    Thrombosis Mother     Cancer Father     Diabetes Neg Hx       Social History     Tobacco Use    Smoking status: Former     Passive exposure: Never    Smokeless tobacco: Never   Vaping Use    Vaping status: Never Used   Substance Use Topics    Alcohol use: Not Currently    Drug use: No      E-Cigarette/Vaping    E-Cigarette Use Never User       E-Cigarette/Vaping Substances    Nicotine No     THC No     CBD No     Flavoring No     Other No     Unknown No       I have reviewed and agree with the  history as documented.     Patient presents for evaluation of fluttering in her chest started around 04 100.  Directed to the ER by her cardiologist Dr. Zapata.  Denies any chest pain or shortness of breath.      History provided by:  Patient   used: No        Review of Systems   Cardiovascular:  Positive for palpitations.   All other systems reviewed and are negative.          Objective       ED Triage Vitals [02/05/25 1015]   Temperature Pulse Blood Pressure Respirations SpO2 Patient Position - Orthostatic VS   (!) 97 °F (36.1 °C) (!) 124 138/78 19 98 % Sitting      Temp Source Heart Rate Source BP Location FiO2 (%) Pain Score    Temporal Monitor Right arm -- No Pain      Vitals      Date and Time Temp Pulse SpO2 Resp BP Pain Score FACES Pain Rating User   02/05/25 1330 -- 60 99 % 20 126/78 -- -- CG   02/05/25 1300 -- 60 99 % 23 144/65 -- -- OE   02/05/25 1145 -- 117 100 % 24 146/64 -- -- OE   02/05/25 1130 -- 112 99 % 21 -- -- -- OE   02/05/25 1115 -- 118 98 % 20 141/77 -- -- OE   02/05/25 1104 -- 110 98 % 21 -- -- -- OE   02/05/25 1100 -- 117 98 % 21 138/80 -- -- OE   02/05/25 1045 -- 129 99 % 22 122/77 -- -- OE   02/05/25 1015 97 °F (36.1 °C) 124 98 % 19 138/78 No Pain -- AC            Physical Exam  Vitals and nursing note reviewed.   Constitutional:       General: She is not in acute distress.  Cardiovascular:      Rate and Rhythm: Tachycardia present. Rhythm irregular.   Pulmonary:      Effort: Pulmonary effort is normal. No respiratory distress.      Breath sounds: Normal breath sounds.   Neurological:      General: No focal deficit present.      Mental Status: She is alert and oriented to person, place, and time.         Results Reviewed       Procedure Component Value Units Date/Time    HS Troponin I 2hr [[id number]]  (Normal) Collected: 02/05/25 1258    Lab Status: Final result Specimen: Blood from Arm, Right Updated: 02/05/25 1328     hs TnI 2hr 4 ng/L      Delta 2hr hsTnI -1 ng/L      Protime-INR [[id number]]  (Normal) Collected: 02/05/25 1038    Lab Status: Final result Specimen: Blood from Arm, Right Updated: 02/05/25 1118     Protime 13.9 seconds      INR 1.02    Narrative:      INR Therapeutic Range    Indication                                             INR Range      Atrial Fibrillation                                               2.0-3.0  Hypercoagulable State                                    2.0.2.3  Left Ventricular Asist Device                            2.0-3.0  Mechanical Heart Valve                                  -    Aortic(with afib, MI, embolism, HF, LA enlargement,    and/or coagulopathy)                                     2.0-3.0 (2.5-3.5)     Mitral                                                             2.5-3.5  Prosthetic/Bioprosthetic Heart Valve               2.0-3.0  Venous thromboembolism (VTE: VT, PE        2.0-3.0    APTT [[id number]]  (Normal) Collected: 02/05/25 1038    Lab Status: Final result Specimen: Blood from Arm, Right Updated: 02/05/25 1118     PTT 29 seconds     HS Troponin 0hr (reflex protocol) [[id number]]  (Normal) Collected: 02/05/25 1038    Lab Status: Final result Specimen: Blood from Arm, Right Updated: 02/05/25 1115     hs TnI 0hr 5 ng/L     Comprehensive metabolic panel [[id number]] Collected: 02/05/25 1038    Lab Status: Final result Specimen: Blood from Arm, Right Updated: 02/05/25 1105     Sodium 137 mmol/L      Potassium 4.1 mmol/L      Chloride 103 mmol/L      CO2 23 mmol/L      ANION GAP 11 mmol/L      BUN 20 mg/dL      Creatinine 0.87 mg/dL      Glucose 94 mg/dL      Calcium 9.2 mg/dL      AST 23 U/L      ALT 13 U/L      Alkaline Phosphatase 75 U/L      Total Protein 7.3 g/dL      Albumin 4.1 g/dL      Total Bilirubin 0.31 mg/dL      eGFR 58 ml/min/1.73sq m     Narrative:      [employer] guidelines for Chronic Kidney Disease (CKD):     Stage 1 with normal or high GFR (GFR > 90 mL/min/1.73 square meters)    Stage  2 Mild CKD (GFR = 60-89 mL/min/1.73 square meters)    Stage 3A Moderate CKD (GFR = 45-59 mL/min/1.73 square meters)    Stage 3B Moderate CKD (GFR = 30-44 mL/min/1.73 square meters)    Stage 4 Severe CKD (GFR = 15-29 mL/min/1.73 square meters)    Stage 5 End Stage CKD (GFR <15 mL/min/1.73 square meters)  Note: GFR calculation is accurate only with a steady state creatinine    Magnesium [[id number]]  (Normal) Collected: 02/05/25 1038    Lab Status: Final result Specimen: Blood from Arm, Right Updated: 02/05/25 1105     Magnesium 1.9 mg/dL     CBC and differential [[id number]]  (Abnormal) Collected: 02/05/25 1038    Lab Status: Final result Specimen: Blood from Arm, Right Updated: 02/05/25 1048     WBC 6.24 Thousand/uL      RBC 3.24 Million/uL      Hemoglobin 12.5 g/dL      Hematocrit 38.9 %       fL      MCH 38.6 pg      MCHC 32.1 g/dL      RDW 13.9 %      MPV 10.4 fL      Platelets 370 Thousands/uL      nRBC 0 /100 WBCs      Segmented % 65 %      Immature Grans % 0 %      Lymphocytes % 23 %      Monocytes % 10 %      Eosinophils Relative 1 %      Basophils Relative 1 %      Absolute Neutrophils 4.08 Thousands/µL      Absolute Immature Grans 0.02 Thousand/uL      Absolute Lymphocytes 1.41 Thousands/µL      Absolute Monocytes 0.61 Thousand/µL      Eosinophils Absolute 0.09 Thousand/µL      Basophils Absolute 0.03 Thousands/µL             XR chest 1 view portable   Final Interpretation by Blank Echeverria MD (02/05 1140)      No acute cardiopulmonary disease.               Workstation performed: [id number]             ECG 12 Lead Documentation Only    Date/Time: 2/5/2025 10:29 AM    Performed by: Keshawn Shah DO  Authorized by: Keshawn Shah DO    ECG reviewed by me, the ED Provider: yes    Patient location:  ED  Interpretation:     Interpretation: abnormal    Rate:     ECG rate:  118    ECG rate assessment: tachycardic    Rhythm:     Rhythm: atrial fibrillation    Ectopy:     Ectopy: none    ST segments:      ST segments:  Normal  T waves:     T waves: normal    ECG 12 Lead Documentation Only    Date/Time: 2/5/2025 12:37 PM    Performed by: Keshawn Shah DO  Authorized by: Keshawn Shah DO    ECG reviewed by me, the ED Provider: yes    Patient location:  ED  Interpretation:     Interpretation: abnormal    Rate:     ECG rate:  62    ECG rate assessment: normal    Rhythm:     Rhythm: paced    Pacing:     Type of pacing:  Atrial  Ectopy:     Ectopy: none        ED Medication and Procedure Management   Prior to Admission Medications   Prescriptions Last Dose Informant Patient Reported? Taking?   Cholecalciferol 25 MCG (1000 UT) CHEW   Yes No   Sig: Chew   acetaminophen (TYLENOL) 325 mg tablet   No No   Sig: Take 2 tablets (650 mg total) by mouth every 6 (six) hours as needed for mild pain, fever or headaches   amLODIPine (NORVASC) 5 mg tablet   No No   Sig: Take 1 tablet (5 mg total) by mouth daily   aspirin (ECOTRIN LOW STRENGTH) 81 mg EC tablet  Self No No   Sig: Take 1 tablet (81 mg total) by mouth daily   cyanocobalamin (VITAMIN B-12) 500 MCG tablet   No No   Sig: Take 0.5 tablets (250 mcg total) by mouth daily   hydroxyurea (HYDREA) 500 mg capsule   Yes No   Sig: daily   levothyroxine 75 mcg tablet   No No   Sig: Take 1 tablet (75 mcg total) by mouth daily   meclizine (ANTIVERT) 25 mg tablet  Self Yes No   Sig: Take 25 mg by mouth 3 (three) times a day as needed for dizziness   metoprolol succinate (TOPROL-XL) 25 mg 24 hr tablet   No No   Sig: Take 1 tablet (25 mg total) by mouth 2 (two) times a day   omeprazole (PriLOSEC) 20 mg delayed release capsule   No No   Sig: Take 1 capsule (20 mg total) by mouth daily   rosuvastatin (CRESTOR) 40 MG tablet   No No   Sig: Take 1 tablet (40 mg total) by mouth every evening      Facility-Administered Medications: None     Discharge Medication List as of 2/5/2025  1:29 PM        START taking these medications    Details   apixaban (Eliquis) 2.5 mg Take 1 tablet (2.5 mg total) by  mouth 2 (two) times a day, Starting Wed 2/5/2025, Until Fri 3/7/2025, Normal           CONTINUE these medications which have NOT CHANGED    Details   acetaminophen (TYLENOL) 325 mg tablet Take 2 tablets (650 mg total) by mouth every 6 (six) hours as needed for mild pain, fever or headaches, Starting Fri 12/13/2024, No Print      amLODIPine (NORVASC) 5 mg tablet Take 1 tablet (5 mg total) by mouth daily, Starting Sat 12/14/2024, Normal      aspirin (ECOTRIN LOW STRENGTH) 81 mg EC tablet Take 1 tablet (81 mg total) by mouth daily, Starting Tue 10/6/2020, Until Tue 11/26/2024, Normal      Cholecalciferol 25 MCG (1000 UT) CHEW Chew, Historical Med      cyanocobalamin (VITAMIN B-12) 500 MCG tablet Take 0.5 tablets (250 mcg total) by mouth daily, Starting Sat 12/14/2024, No Print      hydroxyurea (HYDREA) 500 mg capsule daily, Starting Tue 8/20/2024, Historical Med      levothyroxine 75 mcg tablet Take 1 tablet (75 mcg total) by mouth daily, Starting Mon 9/16/2024, Normal      meclizine (ANTIVERT) 25 mg tablet Take 25 mg by mouth 3 (three) times a day as needed for dizziness, Historical Med      metoprolol succinate (TOPROL-XL) 25 mg 24 hr tablet Take 1 tablet (25 mg total) by mouth 2 (two) times a day, Starting Tue 8/27/2024, Normal      omeprazole (PriLOSEC) 20 mg delayed release capsule Take 1 capsule (20 mg total) by mouth daily, Starting Tue 8/27/2024, Normal      rosuvastatin (CRESTOR) 40 MG tablet Take 1 tablet (40 mg total) by mouth every evening, Starting Tue 8/27/2024, Normal           No discharge procedures on file.  ED SEPSIS DOCUMENTATION   Time reflects when diagnosis was documented in both MDM as applicable and the Disposition within this note       Time User Action Codes Description Comment    2/5/2025  1:28 PM Keshawn Shah [I48.91] Atrial fibrillation with rapid ventricular response (HCC)                  Keshawn Shah DO  02/05/25 [id number]

## 2025-02-05 NOTE — TELEPHONE ENCOUNTER
Afib on pacemaker since 04:23 this AM.Pt stated waking up feeling fast heart & has decreased since. Per Dr. Zapata, called pt & spoke with daughter, Tiffanie & advised pt to go to ER for evaluation. Tiffanie & pt agreeable & will go to [facility].

## 2025-02-11 ENCOUNTER — OFFICE VISIT (OUTPATIENT)
Dept: CARDIOLOGY CLINIC | Facility: CLINIC | Age: OVER 89
End: 2025-02-11
Payer: MEDICARE

## 2025-02-11 VITALS
DIASTOLIC BLOOD PRESSURE: 80 MMHG | WEIGHT: 125 LBS | SYSTOLIC BLOOD PRESSURE: 120 MMHG | OXYGEN SATURATION: 98 % | HEART RATE: 74 BPM | BODY MASS INDEX: 24.54 KG/M2 | HEIGHT: 60 IN

## 2025-02-11 DIAGNOSIS — Z95.0 S/P PLACEMENT OF CARDIAC PACEMAKER: ICD-10-CM

## 2025-02-11 DIAGNOSIS — E78.5 DYSLIPIDEMIA: ICD-10-CM

## 2025-02-11 DIAGNOSIS — R06.09 DOE (DYSPNEA ON EXERTION): ICD-10-CM

## 2025-02-11 DIAGNOSIS — I48.0 PAROXYSMAL ATRIAL FIBRILLATION (HCC): ICD-10-CM

## 2025-02-11 DIAGNOSIS — I10 ESSENTIAL HYPERTENSION: ICD-10-CM

## 2025-02-11 DIAGNOSIS — E03.8 OTHER SPECIFIED HYPOTHYROIDISM: ICD-10-CM

## 2025-02-11 PROCEDURE — 99214 OFFICE O/P EST MOD 30 MIN: CPT | Performed by: INTERNAL MEDICINE

## 2025-02-11 PROCEDURE — 93000 ELECTROCARDIOGRAM COMPLETE: CPT | Performed by: INTERNAL MEDICINE

## 2025-02-11 RX ORDER — METOPROLOL SUCCINATE 50 MG/1
50 TABLET, EXTENDED RELEASE ORAL 2 TIMES DAILY
Qty: 180 TABLET | Refills: 1 | Status: SHIPPED | OUTPATIENT
Start: 2025-02-11

## 2025-02-11 NOTE — PROGRESS NOTES
Progress Note - Cardiology Office  [facility]    Blank Mensah 89 y.o. female MRN: [id number]  : 3/16/1934  Encounter: [id number]      Assessment:     Assessment & Plan  VILLATORO (dyspnea on exertion)  Patient is echo Doppler shows she has a normal LV systolic function.  She will be scheduled for Lexiscan stress test.  S/P placement of cardiac pacemaker  Patient has sick sinus syndrome status post pacemaker.  Paroxysmal atrial fibrillation (HCC)  Patient has documented evidence of atrial fibrillation and EKG was reviewed.  Since put on metoprolol she is maintained sinus rhythm.  Will increase metoprolol to 50 mg twice a day and discontinue amlodipine.  If she still have breakthrough episode of atrial fibrillation she will be started on amiodarone.  She is already on Eliquis 2.5 twice a day continue same Rx.  Essential hypertension  Blood pressure is acceptable as metoprolol increased we have discontinued amlodipine.  Dyslipidemia  Statins.  Other specified hypothyroidism  She is on levothyroxine TSH has been acceptable.       Discussion Summary and Plan:    Pathophysiology of atrial fibrillation discussed with patient and patient's daughter.  Started on increased dose of metoprolol.  Amlodipine discontinued.  Due to atrial fibrillation, dyspnea exertion she will be scheduled for Lexiscan stress test.  Continue Eliquis.  If she has any other recurrent episode we will consider starting amiodarone.    Follow-up 3 to 6 months.      Patient / Caretaker was advised and educated to call our office  immediately if  patient has any new symptoms of chest pain/shortness of breath, near-syncope, syncope, light headedness sustained palpitations  or any other cardiovascular symptoms before their scheduled follow-up appointment.  Office number was provided # [phone]  Please call [phone] if any questions.  Counseling :  A description of the counseling.  Goals and Barriers.  Patient's ability to  self care: Yes  Medication side effect reviewed with patient in detail and all their questions answered to their satisfaction.    HPI :     Blank Mensah is a 89 y.o. year old female who came for follow up. Patient has medical history significant for sick sinus syndrome s/p dual-chamber pacemaker in December 2024, PAF who was recently in the emergency room with episode of atrial fibrillation, hypertensive heart disease, hypothyroidism, dyslipidemia on Crestor, some exertional shortness of breath who came for follow-up.  Patient was recently in the emergency room with A-fib and she was put on metoprolol today she is atrial paced heart rate 74 bpm.  She came with her daughter.  She denies any chest pain but she does get palpitations.  She does feel the difference since she has been put on metoprolol and it has helped with her palpitations.  She does not smoke does not drink.  She came with her daughter.  No other cardiovascular issues today.    Review of Systems   Constitutional:  Negative for activity change, chills, diaphoresis, fever and unexpected weight change.   HENT:  Negative for congestion.    Eyes:  Negative for discharge and redness.   Respiratory:  Negative for cough, chest tightness, shortness of breath and wheezing.    Cardiovascular: Negative.  Negative for chest pain, palpitations and leg swelling.   Gastrointestinal:  Negative for abdominal pain, diarrhea and nausea.   Endocrine: Negative.    Genitourinary:  Negative for decreased urine volume and urgency.   Musculoskeletal:  Positive for arthralgias and gait problem. Negative for back pain.   Skin:  Negative for rash and wound.   Allergic/Immunologic: Negative.    Neurological:  Negative for dizziness, seizures, syncope, weakness, light-headedness and headaches.   Hematological: Negative.    Psychiatric/Behavioral:  Negative for agitation and confusion. The patient is nervous/anxious.        Historical Information   Past Medical History:    Diagnosis Date   • Anxiety    • GERD (gastroesophageal reflux disease)    • Hypertension    • Hypothyroid    • Vertigo      Past Surgical History:   Procedure Laterality Date   • BREAST BIOPSY Left 1974   • CARDIAC ELECTROPHYSIOLOGY PROCEDURE Left 12/12/2024    Procedure: Cardiac pacer implant;  Surgeon: Ruma Emanuel MD;  Location: [facility];  Service: Cardiology   • COLONOSCOPY  04/2018   • HYSTERECTOMY       Social History     Substance and Sexual Activity   Alcohol Use Not Currently     Social History     Substance and Sexual Activity   Drug Use No     Social History     Tobacco Use   Smoking Status Former   • Passive exposure: Never   Smokeless Tobacco Never     Family History:   Family History   Problem Relation Age of Onset   • Thrombosis Mother    • Cancer Father    • Diabetes Neg Hx        Meds/Allergies     Allergies   Allergen Reactions   • Iodinated Contrast Media    • Dye Fdc Red [Red Dye - Food Allergy] Palpitations       Current Outpatient Medications:   •  metoprolol succinate (TOPROL-XL) 50 mg 24 hr tablet, Take 1 tablet (50 mg total) by mouth 2 (two) times a day, Disp: 180 tablet, Rfl: 1  •  acetaminophen (TYLENOL) 325 mg tablet, Take 2 tablets (650 mg total) by mouth every 6 (six) hours as needed for mild pain, fever or headaches, Disp: , Rfl:   •  apixaban (Eliquis) 2.5 mg, Take 1 tablet (2.5 mg total) by mouth 2 (two) times a day, Disp: 60 tablet, Rfl: 0  •  aspirin (ECOTRIN LOW STRENGTH) 81 mg EC tablet, Take 1 tablet (81 mg total) by mouth daily, Disp: 30 tablet, Rfl: 0  •  Cholecalciferol 25 MCG (1000 UT) CHEW, Chew, Disp: , Rfl:   •  cyanocobalamin (VITAMIN B-12) 500 MCG tablet, Take 0.5 tablets (250 mcg total) by mouth daily, Disp: , Rfl:   •  hydroxyurea (HYDREA) 500 mg capsule, daily, Disp: , Rfl:   •  levothyroxine 75 mcg tablet, Take 1 tablet (75 mcg total) by mouth daily, Disp: 90 tablet, Rfl: 0  •  meclizine (ANTIVERT) 25 mg tablet, Take 25 mg by mouth 3 (three) times a  day as needed for dizziness, Disp: , Rfl:   •  omeprazole (PriLOSEC) 20 mg delayed release capsule, Take 1 capsule (20 mg total) by mouth daily, Disp: 90 capsule, Rfl: 1  •  rosuvastatin (CRESTOR) 40 MG tablet, Take 1 tablet (40 mg total) by mouth every evening, Disp: 90 tablet, Rfl: 1    Vitals: Blood pressure 120/80, pulse 74, height 5' (1.524 m), weight 56.7 kg (125 lb), SpO2 98%, not currently breastfeeding.    Body mass index is 24.41 kg/m².  Wt Readings from Last 3 Encounters:   02/11/25 56.7 kg (125 lb)   02/05/25 56.6 kg (124 lb 12.8 oz)   12/27/24 57.8 kg (127 lb 6.4 oz)     Vitals:    02/11/25 1444   Weight: 56.7 kg (125 lb)     BP Readings from Last 3 Encounters:   02/11/25 120/80   02/05/25 126/78   12/27/24 140/74       Physical Exam:  Neurologic:  Alert & oriented x 3, no new focal deficits, Not in any acute distress,  Constitutional:  Adequate built, non-toxic appearance   Neck: Normal range of motion, no tenderness,  Neck supple   Respiratory:  Bilateral air entry, mostly clear to auscultation  Cardiovascular: S1-S2 regular with a 3/6 ejection systolic murmur and S4 is present  GI:  Soft, nondistended,  nontender, no hepatosplenomegaly appreciated.  Musculoskeletal:  No edema, no tenderness, no deformities.   Skin:  Well hydrated, no rash   Extremities:  No edema   Psychiatric:  Speech and behavior appropriate         Diagnostic Studies Review Cardio:    Doppler done in December 2024 EF 60%, mild valvular disease.    Her device interrogation 1/27/2025 shows she is about 80% paced and has a 12-year battery.  She had episodes of fast episodes of SVT/A-fib.    Device interrogation again 2/5/2025 shows episode of fast heartbeat and she was put on metoprolol.  She had episode of atrial fibrillation.  EKG:    Twelve-lead EKG done 2/11/2025 atrial paced heart rate 74 bpm  XR chest 1 view portable  Result Date: 2/5/2025  Narrative: XR CHEST PORTABLE INDICATION:  afib. COMPARISON: CXR 12/30/2024, 12/12/2024,  CTA neck 10/04/2020. FINDINGS: Clear lungs. Benign biapical pleural-parenchymal scar. No pneumothorax or pleural effusion. Mild cardiomegaly with left subclavian pacemaker leads in right atrium and right ventricle. Bones are unremarkable for age. Unremarkable upper abdomen.     Impression: No acute cardiopulmonary disease. Workstation performed: [id number]     Cardiac EP device report  Result Date: 2/5/2025  Narrative: MDT DC PPM (MVP ON) - ACTIVE SYSTEM IS MRI CONDITIONAL [employer] TRANSMISSION ALERT- NB: PT IN AF SINCE 04:23 THIS MORNING. PT STATED SHE WOKE UP FEELING FAST HEART RATE. AF/VS @ 120 BPM ON CURRENT EGM. PT ON ASA 81MG DAILY & METOPROLOL 25MG TWICE DAILY & PT WILL TAKE AM DOSE NOW. PT IS NOT ON ANY ANTICOAGULATION. MESSAGED DR. SANCHEZ. BATTERY VOLTAGE ADEQUATE (12.2 YRS). AP<0.1%, VP3%. ALL AVAILABLE LEAD PARAMETERS WITHIN NORMAL LIMITS. NORMAL DEVICE FUNCTION.      Cardiac EP device report  Result Date: 1/27/2025  Narrative: MDT DC PPM (MVP ON) - ACTIVE SYSTEM IS MRI CONDITIONAL DEVICE INTERROGATED IN THE [place] OFFICE: BATTERY VOLTAGE ADEQUATE-12 YRS. AP 80%  0%. ALL AVAILABLE LEAD PARAMETERS WITHIN NORMAL LIMITS. 2 VHRS & 1 FAST A/V NOTED. AVAIL EGRAMS PRESENT AS SVT. PT ON METO SUCC & EF 60% (ECHO 2024). NO PROGRAMMING CHANGES MADE TO DEVICE PARAMETERS. NORMAL DEVICE FUNCTION. NC     Cardiac EP device report  Result Date: 1/18/2025  Narrative: MDT DC PPM (MVP ON) - ACTIVE SYSTEM IS MRI CONDITIONAL [employer]EL[employer] TRANSMISSION ALERT FOR AT/AF & VT EPISODES.   BATTERY VOLTAGE ADEQUATE (12.4 YRS).   AP 79.1%   <0.1%.  ALL AVAILABLE LEAD PARAMETERS WITHIN NORMAL LIMITS.  6 DEVICE CLASSIFIED VT EPISODES, ALL AVAIL EGM'S SHOW PAT, AVG -179 BPM.  9 AT/AF EPISODES, AVAIL EGM SHOWS AF. LONGEST EPISODE 3 MINS 6 SECS W/EPISODE IN PROGRESS.   PT TAKES ASA, METOPROLOL SUCC.   EF 60 (ECHO 12/10/2024).  TIME IN AT/AF <0.1%.  TASK TO DR INIGUEZ RE: AF.  NORMAL DEVICE FUNCTION.  PAS       Imaging:  Chest X-Ray:   XR  "chest pa and lateral  Result Date: 12/13/2024  Impression No focal consolidation, pleural effusion, or pneumothorax. Workstation performed: [id number]       CT-scan of the chest:     No CTA results available for this patient.  Lab Review   Lab Results   Component Value Date    WBC 6.24 02/05/2025    HGB 12.5 02/05/2025    HCT 38.9 02/05/2025     (H) 02/05/2025    RDW 13.9 02/05/2025     02/05/2025     BMP:  Lab Results   Component Value Date    SODIUM 137 02/05/2025    K 4.1 02/05/2025     02/05/2025    CO2 23 02/05/2025    ANIONGAP 8 07/12/2014    BUN 20 02/05/2025    CREATININE 0.87 02/05/2025    GLUC 94 02/05/2025    GLUF 88 11/11/2020    CALCIUM 9.2 02/05/2025    EGFR 58 02/05/2025    MG 1.9 02/05/2025     Troponins:  Results from last 7 days   Lab Units 02/05/25  1258   HSTNI D2 ng/L -1       LFT:  Lab Results   Component Value Date    AST 23 02/05/2025    ALT 13 02/05/2025    ALKPHOS 75 02/05/2025    TP 7.3 02/05/2025    ALB 4.1 02/05/2025      No components found for: \"TSH3\"  Lab Results   Component Value Date    FLS9WVSAFFHE 0.528 12/09/2024     Lab Results   Component Value Date    HGBA1C 5.5 10/05/2020     Lipid Profile:   Lab Results   Component Value Date    CHOLESTEROL 214 (H) 11/11/2020     11/11/2020    LDLCALC 94 11/11/2020    TRIG 73 11/11/2020     Lab Results   Component Value Date    CHOLESTEROL 214 (H) 11/11/2020    CHOLESTEROL 236 (H) 10/05/2020     Lab Results   Component Value Date    TROPONINI <0.02 10/04/2020     Lab Results   Component Value Date    NTBNP 650 (H) 11/16/2020              Dr. Ruma Emanuel MD FAC        \"This note was completed in part utilizing m-modal fluency direct voice recognition software.   Grammatical errors, random word insertion, spelling mistakes, and incomplete sentences may be an occasional consequence of the system secondary to software limitations, ambient noise and hardware issues.    Please read the chart carefully and " "recognize, using context, where substitutions have occurred.  If you have any questions or concerns about the context, text or information contained within the body of this dictation, please contact myself, the provider, for further clarification.\"  "

## 2025-02-11 NOTE — ASSESSMENT & PLAN NOTE
Patient has documented evidence of atrial fibrillation and EKG was reviewed.  Since put on metoprolol she is maintained sinus rhythm.  Will increase metoprolol to 50 mg twice a day and discontinue amlodipine.  If she still have breakthrough episode of atrial fibrillation she will be started on amiodarone.  She is already on Eliquis 2.5 twice a day continue same Rx.

## 2025-02-25 ENCOUNTER — TELEPHONE (OUTPATIENT)
Dept: CARDIOLOGY CLINIC | Facility: CLINIC | Age: OVER 89
End: 2025-02-25

## 2025-02-25 DIAGNOSIS — I48.91 ATRIAL FIBRILLATION WITH RAPID VENTRICULAR RESPONSE (HCC): ICD-10-CM

## 2025-02-25 NOTE — TELEPHONE ENCOUNTER
Pts daughter looking to confirm appointment dates and times. I advised of an appt on 4/28 but she states that was to be cancelled due to the provider being on maternity leave. She did have the info for appt in June but asking if office can call her and confirm so that she doesn't have to bring elderly mother out if not needed Please call pt when able

## 2025-02-25 NOTE — TELEPHONE ENCOUNTER
Reason for call:   [x] Refill   [] Prior Auth  [] Other:     Office:   [] PCP/Provider -   [x] Specialty/Provider -     apixaban (Eliquis) 2.5 mg 2.5 mg, Oral, 2 times daily       Quantity: 90    Pharmacy: [facility]    Does the patient have enough for 3 days?   [] Yes   [x] No - Send as HP to POD

## 2025-03-05 ENCOUNTER — TELEPHONE (OUTPATIENT)
Dept: VASCULAR SURGERY | Facility: CLINIC | Age: OVER 89
End: 2025-03-05

## 2025-03-05 ENCOUNTER — TRANSCRIBE ORDERS (OUTPATIENT)
Dept: VASCULAR SURGERY | Facility: CLINIC | Age: OVER 89
End: 2025-03-05

## 2025-03-05 DIAGNOSIS — I65.23 BILATERAL CAROTID ARTERY STENOSIS: Primary | ICD-10-CM

## 2025-03-05 NOTE — TELEPHONE ENCOUNTER
Attempted to contact patient to schedule appointment(s) listed below.  Requested patient call [phone] to schedule appointment(s).    Patient's appointment(s) are due now.    Dopplers  [] Abdominal Aorta Iliac (AOIL)  [x] Carotid (CV)   [] Celiac and/or Mesenteric  [] Endovascular Aortic Repair (EVAR)   [] Hemodialysis Access (HD)   [] Lower Limb Arterial (KYLE)  [] Lower Limb Venous (LEV)  [] Lower Limb Venous Duplex with Reflux (LEVDR)  [] Renal Artery  [] Upper Limb Arterial (UEA)    [] Upper Limb Venous (UEV)              [] JIMENA and Waveform analysis     Advanced Imaging   [] CTA head/neck    [] CTA abdomen    [] CTA abdomen & pelvis    [] CT abdomen with/ without contrast  [] CT abdomen with contrast  [] CT abdomen without contrast    [] CT abdomen & pelvis with/ without contrast  [] CT abdomen & pelvis with contrast  [] CT abdomen & pelvis without contrast    Office Visit   [] New patient, patient last seen over 3 years ago  [] Risk factor modification (RFM)   [x] Follow up   [] Lost to follow up (LTFU)   Pt needs CV to be scheduled and OV with RD l/s 3/15/24 RD

## 2025-03-10 NOTE — TELEPHONE ENCOUNTER
Called patient to schedule carotid ultrasound and office visit afterwards.  Patient indicated that her daughter will call back if they decide to schedule the carotid ultrasound and office visit.  Office number provided.

## 2025-03-30 DIAGNOSIS — I48.91 ATRIAL FIBRILLATION WITH RAPID VENTRICULAR RESPONSE (HCC): ICD-10-CM

## 2025-03-31 RX ORDER — APIXABAN 2.5 MG/1
2.5 TABLET, FILM COATED ORAL 2 TIMES DAILY
Qty: 60 TABLET | Refills: 2 | Status: SHIPPED | OUTPATIENT
Start: 2025-03-31

## 2025-04-15 ENCOUNTER — TELEPHONE (OUTPATIENT)
Dept: ADMINISTRATIVE | Facility: OTHER | Age: OVER 89
End: 2025-04-15

## 2025-04-15 NOTE — TELEPHONE ENCOUNTER
04/15/25 1:27 PM    Patient contacted to bring Advance Directive, POLST, or Living Will document to next scheduled pcp visit.[facility] Department left message.    Thank you.  Leslie Acosta MA  [facility]

## 2025-04-16 ENCOUNTER — OFFICE VISIT (OUTPATIENT)
Dept: FAMILY MEDICINE CLINIC | Facility: CLINIC | Age: OVER 89
End: 2025-04-16
Payer: MEDICARE

## 2025-04-16 VITALS
SYSTOLIC BLOOD PRESSURE: 126 MMHG | TEMPERATURE: 96.7 F | DIASTOLIC BLOOD PRESSURE: 80 MMHG | RESPIRATION RATE: 18 BRPM | HEIGHT: 60 IN | OXYGEN SATURATION: 98 % | BODY MASS INDEX: 24.19 KG/M2 | WEIGHT: 123.2 LBS | HEART RATE: 86 BPM

## 2025-04-16 DIAGNOSIS — Z79.899 ENCOUNTER FOR LONG-TERM CURRENT USE OF MEDICATION: ICD-10-CM

## 2025-04-16 DIAGNOSIS — E78.2 MIXED HYPERLIPIDEMIA: ICD-10-CM

## 2025-04-16 DIAGNOSIS — D47.3 ESSENTIAL THROMBOCYTOSIS (HCC): ICD-10-CM

## 2025-04-16 DIAGNOSIS — K21.9 GASTROESOPHAGEAL REFLUX DISEASE WITHOUT ESOPHAGITIS: ICD-10-CM

## 2025-04-16 DIAGNOSIS — E53.8 VITAMIN B12 DEFICIENCY: ICD-10-CM

## 2025-04-16 DIAGNOSIS — I10 ESSENTIAL HYPERTENSION: Primary | ICD-10-CM

## 2025-04-16 PROCEDURE — 99214 OFFICE O/P EST MOD 30 MIN: CPT | Performed by: FAMILY MEDICINE

## 2025-04-16 PROCEDURE — G2211 COMPLEX E/M VISIT ADD ON: HCPCS | Performed by: FAMILY MEDICINE

## 2025-04-16 RX ORDER — OMEPRAZOLE 40 MG/1
40 CAPSULE, DELAYED RELEASE ORAL DAILY
Qty: 90 CAPSULE | Refills: 1 | Status: SHIPPED | OUTPATIENT
Start: 2025-04-16

## 2025-04-16 NOTE — ASSESSMENT & PLAN NOTE
Not controlled  Dose of omeprazole increased from 20 to 40 mg daily  Patient would like to avoid doing an endoscopy due to her advanced age and risk of anesthesia  Orders:  •  omeprazole (PriLOSEC) 40 MG capsule; Take 1 capsule (40 mg total) by mouth daily

## 2025-04-16 NOTE — PROGRESS NOTES
Name: Blank Mensah      : 3/16/1934      MRN: [id number]  Encounter Provider: Andreia Barba DO  Encounter Date: 2025   Encounter department: [facility]  :  Assessment & Plan  Essential thrombocytosis (HCC)  Managed by hematology oncology         Essential hypertension  Stable  Continue metoprolol 50 mg twice daily  Orders:  •  Comprehensive metabolic panel; Future  •  Lipid Panel with Direct LDL reflex; Future    Mixed hyperlipidemia  Stable  Continue rosuvastatin 40 mg daily  Orders:  •  Lipid Panel with Direct LDL reflex; Future    Gastroesophageal reflux disease without esophagitis  Not controlled  Dose of omeprazole increased from 20 to 40 mg daily  Patient would like to avoid doing an endoscopy due to her advanced age and risk of anesthesia  Orders:  •  omeprazole (PriLOSEC) 40 MG capsule; Take 1 capsule (40 mg total) by mouth daily    Vitamin B12 deficiency    Orders:  •  Vitamin B12; Future    Encounter for long-term current use of medication    Orders:  •  Magnesium; Future    Return in about 6 months (around 10/16/2025) for Next scheduled follow up.       History of Present Illness   She is still seeing Dr. Rodriguez    Her energy level is the same. It takes her longer to do things.     She has had a long term issues with constipation. She is taking an over the counter stool medicine.     She has been feeling slime come up in there throat. It is not worse at night  Food doesn't make a difference.     Tums seemed to help     She has been taking over the counter prilosec       Review of Systems    Objective   /80   Pulse 86   Temp (!) 96.7 °F (35.9 °C)   Resp 18   Ht 5' (1.524 m)   Wt 55.9 kg (123 lb 3.2 oz)   SpO2 98%   BMI 24.06 kg/m²      Physical Exam  Vitals and nursing note reviewed.   Constitutional:       General: She is not in acute distress.     Appearance: She is well-developed.   HENT:      Head: Normocephalic and atraumatic.      Right Ear: Tympanic membrane  normal.      Left Ear: Tympanic membrane normal.   Cardiovascular:      Rate and Rhythm: Normal rate and regular rhythm.      Heart sounds: No murmur heard.  Pulmonary:      Effort: Pulmonary effort is normal. No respiratory distress.      Breath sounds: Normal breath sounds.   Abdominal:      Tenderness: There is no abdominal tenderness.   Musculoskeletal:      Cervical back: Neck supple.   Neurological:      Mental Status: She is alert.

## 2025-04-16 NOTE — ASSESSMENT & PLAN NOTE
Stable  Continue metoprolol 50 mg twice daily  Orders:  •  Comprehensive metabolic panel; Future  •  Lipid Panel with Direct LDL reflex; Future

## 2025-04-25 DIAGNOSIS — E03.9 HYPOTHYROID: ICD-10-CM

## 2025-04-25 RX ORDER — LEVOTHYROXINE SODIUM 75 UG/1
75 TABLET ORAL DAILY
Qty: 90 TABLET | Refills: 0 | Status: SHIPPED | OUTPATIENT
Start: 2025-04-25

## 2025-04-25 NOTE — TELEPHONE ENCOUNTER
Reason for call:   [x] Refill   [] Prior Auth  [] Other:     Office:   [x] PCP/Provider - [facility]/ Andreia baird,   [] Specialty/Provider -     Medication: levothyroxine 75 mcg tablet     Dose/Frequency: Take 1 tablet (75 mcg total) by mouth daily     Quantity: 90    Pharmacy: [facility] [address] [place], [place] - [address]     Local Pharmacy   Does the patient have enough for 3 days?   [x] Yes   [] No - Send as HP to POD    Mail Away Pharmacy   Does the patient have enough for 10 days?   [] Yes   [] No - Send as HP to POD

## 2025-04-28 ENCOUNTER — RESULTS FOLLOW-UP (OUTPATIENT)
Dept: CARDIOLOGY CLINIC | Facility: CLINIC | Age: OVER 89
End: 2025-04-28

## 2025-04-28 ENCOUNTER — IN-CLINIC DEVICE VISIT (OUTPATIENT)
Dept: CARDIOLOGY CLINIC | Facility: CLINIC | Age: OVER 89
End: 2025-04-28
Payer: MEDICARE

## 2025-04-28 DIAGNOSIS — Z95.0 CARDIAC PACEMAKER IN SITU: Primary | ICD-10-CM

## 2025-04-28 PROCEDURE — 93280 PM DEVICE PROGR EVAL DUAL: CPT | Performed by: INTERNAL MEDICINE

## 2025-04-28 NOTE — PROGRESS NOTES
"Results for orders placed or performed in visit on 04/28/25   Cardiac EP device report    Narrative    MDT DC PPM (MVP ON) - ACTIVE SYSTEM IS MRI CONDITIONAL  DEVICE INTERROGATED IN THE [place] OFFICE: PRESENTING EGRAM AP VS@ 76 BPM. BATTERY VOLTAGE ADEQUATE-13 YRS. AP 93%  0%. ALL AVAILABLE LEAD PARAMETERS WITHIN NORMAL LIMITS. 97 AT/AF NOTED; 1.3% BURDEN; LONGEST 3.09 HRS. 3 FAST A/V & 29 VT NOTED-SOME RATES >120 BPM. AVAIL EGRAMS SHOWING PAF AND SVT. PT ON ELIQUIS & METO SUCC. EF 60% (ECHO 2024). C/O \"LUMPS\" FELT TO LEFT UNDER DEVICE/UNDER ARM-WHEN PALPATED COULD BE WIRE VS TISSUE. NO PROGRAMMING CHANGES MADE TO DEVICE PARAMETERS. NORMAL DEVICE FUNCTION. NC         "

## 2025-04-28 NOTE — TELEPHONE ENCOUNTER
----- Message from Ruma Emanuel MD sent at 4/28/2025  4:08 PM EDT -----  Patient device interrogation shows that pacemaker is functioning okay but she is having episodes of atrial fibrillation I would start the patient on amiodarone 200 mg daily for 90 days and then reevaluate.  And we will talk to the patient regarding sleep issues but I think at her age it may be okay to start.  If she has any question I can call them.

## 2025-04-28 NOTE — TELEPHONE ENCOUNTER
Left a v/m for the patient to return a call to the clinical staff.  Called patient on her home phone. Pt.is aware of the device function and episodes of Afib. Pt.does feel more tired. She is willing to start on the Amiodarone.

## 2025-04-29 NOTE — TELEPHONE ENCOUNTER
----- Message from Ruma Emanuel MD sent at 4/29/2025  7:28 AM EDT -----  Can you check with the patient does she have any allergy to iodine or contrast.

## 2025-06-05 ENCOUNTER — TELEPHONE (OUTPATIENT)
Dept: CARDIOLOGY CLINIC | Facility: CLINIC | Age: OVER 89
End: 2025-06-05

## 2025-06-10 ENCOUNTER — OFFICE VISIT (OUTPATIENT)
Dept: CARDIOLOGY CLINIC | Facility: CLINIC | Age: OVER 89
End: 2025-06-10
Payer: MEDICARE

## 2025-06-10 VITALS
HEART RATE: 71 BPM | SYSTOLIC BLOOD PRESSURE: 130 MMHG | WEIGHT: 115 LBS | OXYGEN SATURATION: 99 % | BODY MASS INDEX: 22.58 KG/M2 | DIASTOLIC BLOOD PRESSURE: 70 MMHG | HEIGHT: 60 IN

## 2025-06-10 DIAGNOSIS — E78.5 DYSLIPIDEMIA: ICD-10-CM

## 2025-06-10 DIAGNOSIS — Z95.0 CARDIAC PACEMAKER IN SITU: ICD-10-CM

## 2025-06-10 DIAGNOSIS — R06.09 DOE (DYSPNEA ON EXERTION): ICD-10-CM

## 2025-06-10 DIAGNOSIS — I10 ESSENTIAL HYPERTENSION: ICD-10-CM

## 2025-06-10 DIAGNOSIS — I48.0 PAROXYSMAL ATRIAL FIBRILLATION (HCC): ICD-10-CM

## 2025-06-10 PROCEDURE — 99214 OFFICE O/P EST MOD 30 MIN: CPT | Performed by: INTERNAL MEDICINE

## 2025-06-10 PROCEDURE — 93000 ELECTROCARDIOGRAM COMPLETE: CPT | Performed by: INTERNAL MEDICINE

## 2025-06-10 NOTE — PROGRESS NOTES
Progress Note - Cardiology Office  [facility]    Blank Mensah 89 y.o. female MRN: [id number]  : 3/16/1934  Encounter: [id number]      Assessment:     Assessment & Plan  VILLATORO (dyspnea on exertion)    Paroxysmal atrial fibrillation (HCC)    Cardiac pacemaker in situ    Essential hypertension    Dyslipidemia         Discussion Summary and Plan:    VILLATORO (dyspnea on exertion)  Her echo Doppler shows normal LV systolic function she has mild chronic dyspnea exertion she is little bit deconditioned she does not want to do a stress test.    S/P placement of cardiac pacemaker  Patient has sick sinus syndrome status post pacemaker.  Bergeron is functioning normally she is to have short atrial runs her A-fib burden is around 1.6%.    Paroxysmal atrial fibrillation (HCC)    Patient has documented evidence of atrial fibrillation and EKG was reviewed.  Since put on metoprolol she is maintained sinus rhythm.  Will increase metoprolol to 50 mg twice a day he continues to have palpitation and she has A-fib burden 1.6%.  We wanted to start amiodarone unfortunately she has allergy to iodine.  Will hold off and continue beta-blockers.  If this palpitations become further a problem we can use either low-dose flecainide 50 mg twice a day.  She does not want to do stress test.    Essential hypertension  Patient has been acceptable with increased dose of metoprolol.    Dyslipidemia  Statins.    Other specified hypothyroidism    She is on levothyroxine TSH has been acceptable.  On amiodarone.      Plan.      Nuclear stress test will be done and if negative we will start on flecainide 50 mg twice a day.            Patient / Caretaker was advised and educated to call our office  immediately if  patient has any new symptoms of chest pain/shortness of breath, near-syncope, syncope, light headedness sustained palpitations  or any other cardiovascular symptoms before their scheduled follow-up appointment.  Office number  was provided # [phone]  Please call [phone] if any questions.  Counseling :  A description of the counseling.  Goals and Barriers.  Patient's ability to self care: Yes  Medication side effect reviewed with patient in detail and all their questions answered to their satisfaction.    HPI :     Blank Mensah is a 89 y.o. year old female who came for follow up. Patient has medical history significant for sick sinus syndrome s/p dual-chamber pacemaker in December 2024, PAF who was recently in the emergency room with episode of atrial fibrillation, hypertensive heart disease, hypothyroidism, dyslipidemia on Crestor, some exertional shortness of breath who came for follow-up.  Patient was recently in the emergency room with A-fib and she was put on metoprolol today she is atrial paced heart rate 74 bpm.  She came with her daughter.  She denies any chest pain but she does get palpitations.  She does feel the difference since she has been put on metoprolol and it has helped with her palpitations.  She does not smoke does not drink.  She came with her daughter.  No other cardiovascular issues today.    6/10/2025.    Above reviewed.  Patient came for follow-up.  He had a medical history significant for sick sinus syndrome status post dual-chamber pacemaker in December 2020 for, history of atrial fibrillation, hypothyroidism, dyslipidemia on Crestor who came for follow-up.  Her device interrogation shows she has atrial fibrillation burden around 1.6 plan she is on metoprolol along with Crestor hydroxyurea aspirin and Eliquis 2.5 twice a day.  She has not done the stress test we have ordered.  Her blood test from 2/5/2025 reviewed.  Occasionally feels palpitations.  She does not want to stress test anymore.  We discussed about amiodarone otherwise she is feeling well.  No nausea no vomiting her device interrogation reviewed with her.  Her thyroid function was stable on 12/9/2024.  And her chest x-ray in February 2025  was acceptable.    Review of Systems   Constitutional:  Negative for activity change, chills, diaphoresis, fever and unexpected weight change.   HENT:  Negative for congestion.    Eyes:  Negative for discharge and redness.   Respiratory:  Negative for cough, chest tightness, shortness of breath and wheezing.    Cardiovascular:  Positive for palpitations. Negative for chest pain and leg swelling.   Gastrointestinal:  Negative for abdominal pain, diarrhea and nausea.   Endocrine: Negative.    Genitourinary:  Negative for decreased urine volume and urgency.   Musculoskeletal:  Positive for arthralgias and gait problem. Negative for back pain.   Skin:  Negative for rash and wound.   Allergic/Immunologic: Negative.    Neurological:  Negative for dizziness, seizures, syncope, weakness, light-headedness and headaches.   Hematological: Negative.    Psychiatric/Behavioral:  Negative for agitation and confusion. The patient is nervous/anxious.        Historical Information   Past Medical History:   Diagnosis Date    Anxiety     GERD (gastroesophageal reflux disease)     Hypertension     Hypothyroid     Vertigo      Past Surgical History:   Procedure Laterality Date    BREAST BIOPSY Left 1974    CARDIAC ELECTROPHYSIOLOGY PROCEDURE Left 12/12/2024    Procedure: Cardiac pacer implant;  Surgeon: Ruma Emanuel MD;  Location: [facility];  Service: Cardiology    COLONOSCOPY  04/2018    HYSTERECTOMY       Social History     Substance and Sexual Activity   Alcohol Use Not Currently     Social History     Substance and Sexual Activity   Drug Use No     Social History     Tobacco Use   Smoking Status Former    Passive exposure: Never   Smokeless Tobacco Never     Family History:   Family History   Problem Relation Name Age of Onset    Thrombosis Mother      Cancer Father      Diabetes Neg Hx         Meds/Allergies     Allergies   Allergen Reactions    Iodinated Contrast Media     Dye Fdc Red [Red Dye - Food Allergy]  Palpitations       Current Outpatient Medications:     acetaminophen (TYLENOL) 325 mg tablet, Take 2 tablets (650 mg total) by mouth every 6 (six) hours as needed for mild pain, fever or headaches, Disp: , Rfl:     apixaban (Eliquis) 2.5 mg, Take 1 tablet by mouth twice daily, Disp: 60 tablet, Rfl: 2    Cholecalciferol 25 MCG (1000 UT) CHEW, Chew, Disp: , Rfl:     cyanocobalamin (VITAMIN B-12) 500 MCG tablet, Take 0.5 tablets (250 mcg total) by mouth daily, Disp: , Rfl:     hydroxyurea (HYDREA) 500 mg capsule, in the morning., Disp: , Rfl:     levothyroxine 75 mcg tablet, Take 1 tablet (75 mcg total) by mouth daily, Disp: 90 tablet, Rfl: 0    metoprolol succinate (TOPROL-XL) 50 mg 24 hr tablet, Take 1 tablet (50 mg total) by mouth 2 (two) times a day, Disp: 180 tablet, Rfl: 1    omeprazole (PriLOSEC) 40 MG capsule, Take 1 capsule (40 mg total) by mouth daily, Disp: 90 capsule, Rfl: 1    rosuvastatin (CRESTOR) 40 MG tablet, Take 1 tablet (40 mg total) by mouth every evening, Disp: 90 tablet, Rfl: 1    aspirin (ECOTRIN LOW STRENGTH) 81 mg EC tablet, Take 1 tablet (81 mg total) by mouth daily, Disp: 30 tablet, Rfl: 0    Vitals: Blood pressure 130/70, pulse 71, height 5' (1.524 m), weight 52.2 kg (115 lb), SpO2 99%, not currently breastfeeding.    Body mass index is 22.46 kg/m².  Wt Readings from Last 3 Encounters:   06/10/25 52.2 kg (115 lb)   04/16/25 55.9 kg (123 lb 3.2 oz)   02/11/25 56.7 kg (125 lb)     Vitals:    06/10/25 1055   Weight: 52.2 kg (115 lb)     BP Readings from Last 3 Encounters:   06/10/25 130/70   04/16/25 126/80   02/11/25 120/80       Physical Exam:    Physical Exam    Neurologic:  Alert & oriented x 3, no new focal deficits, Not in any acute distress,  Constitutional:    Adequate built,  non-toxic appearance   Neck: No tenderness,  Neck supple, No JVP,   Respiratory:  Bilateral air entry, mostly clear to auscultation  Cardiovascular: S1-S2 regular with a 2/6 ejection systolic murmur and S4 is  present  GI:  Soft, nondistended, no hepatosplenomegaly appreciated.  Musculoskeletal:  No edema, no tenderness, no deformities.   Skin:  Well hydrated, no rash   Extremities:  No edema and distal pulses are present  Psychiatric:  Speech and behavior appropriate      Diagnostic Studies Review Cardio:    Doppler done in December 2024 EF 60%, mild valvular disease.    Her device interrogation 1/27/2025 shows she is about 80% paced and has a 12-year battery.  She had episodes of fast episodes of SVT/A-fib.    Device interrogation on 4/20/2025 shows patient has episodes of atrial fibrillation A-fib burden 1.3%.      EKG:    Twelve-lead EKG done 2/11/2025 atrial paced heart rate 74 bpm    Twelve-lead EKG done on 6/10/2025 shows atrial paced heart rate 71 bpm.    XR chest 1 view portable  Result Date: 2/5/2025  Narrative: XR CHEST PORTABLE INDICATION:  afib. COMPARISON: CXR 12/30/2024, 12/12/2024, CTA neck 10/04/2020. FINDINGS: Clear lungs. Benign biapical pleural-parenchymal scar. No pneumothorax or pleural effusion. Mild cardiomegaly with left subclavian pacemaker leads in right atrium and right ventricle. Bones are unremarkable for age. Unremarkable upper abdomen.     Impression: No acute cardiopulmonary disease. Workstation performed: [id number]     Cardiac EP device report  Result Date: 2/5/2025  Narrative: YOSVANY DC PPM (MVP ON) - ACTIVE SYSTEM IS MRI CONDITIONAL CARELINK TRANSMISSION ALERT- NB: PT IN AF SINCE 04:23 THIS MORNING. PT STATED SHE WOKE UP FEELING FAST HEART RATE. AF/VS @ 120 BPM ON CURRENT EGM. PT ON ASA 81MG DAILY & METOPROLOL 25MG TWICE DAILY & PT WILL TAKE AM DOSE NOW. PT IS NOT ON ANY ANTICOAGULATION. MESSAGED DR. SANCHEZ. BATTERY VOLTAGE ADEQUATE (12.2 YRS). AP<0.1%, VP3%. ALL AVAILABLE LEAD PARAMETERS WITHIN NORMAL LIMITS. NORMAL DEVICE FUNCTION. GV     Cardiac EP device report  Result Date: 1/27/2025  Narrative: YOSVANY DC PPM (MVP ON) - ACTIVE SYSTEM IS MRI CONDITIONAL DEVICE INTERROGATED IN THE [place]  "OFFICE: BATTERY VOLTAGE ADEQUATE-12 YRS. AP 80%  0%. ALL AVAILABLE LEAD PARAMETERS WITHIN NORMAL LIMITS. 2 VHRS & 1 FAST A/V NOTED. AVAIL EGRAMS PRESENT AS SVT. PT ON METO SUCC & EF 60% (ECHO 2024). NO PROGRAMMING CHANGES MADE TO DEVICE PARAMETERS. NORMAL DEVICE FUNCTION. NC     Cardiac EP device report  Result Date: 1/18/2025  Narrative: MDT DC PPM (MVP ON) - ACTIVE SYSTEM IS MRI CONDITIONAL CARELINK TRANSMISSION ALERT FOR AT/AF & VT EPISODES.   BATTERY VOLTAGE ADEQUATE (12.4 YRS).   AP 79.1%   <0.1%.  ALL AVAILABLE LEAD PARAMETERS WITHIN NORMAL LIMITS.  6 DEVICE CLASSIFIED VT EPISODES, ALL AVAIL EGM'S SHOW PAT, AVG -179 BPM.  9 AT/AF EPISODES, AVAIL EGM SHOWS AF. LONGEST EPISODE 3 MINS 6 SECS W/EPISODE IN PROGRESS.   PT TAKES ASA, METOPROLOL SUCC.   EF 60 (ECHO 12/10/2024).  TIME IN AT/AF <0.1%.  TASK TO DR INIGUEZ RE: AF.  NORMAL DEVICE FUNCTION.  PAS       Imaging:  Chest X-Ray:   XR chest pa and lateral  Result Date: 12/13/2024  Impression No focal consolidation, pleural effusion, or pneumothorax. Workstation performed: [id number]       CT-scan of the chest:     No CTA results available for this patient.  Lab Review   Lab Results   Component Value Date    WBC 6.24 02/05/2025    HGB 12.5 02/05/2025    HCT 38.9 02/05/2025     (H) 02/05/2025    RDW 13.9 02/05/2025     02/05/2025     BMP:  Lab Results   Component Value Date    SODIUM 137 02/05/2025    K 4.1 02/05/2025     02/05/2025    CO2 23 02/05/2025    ANIONGAP 8 07/12/2014    BUN 20 02/05/2025    CREATININE 0.87 02/05/2025    GLUC 94 02/05/2025    GLUF 88 11/11/2020    CALCIUM 9.2 02/05/2025    EGFR 58 02/05/2025    MG 1.9 02/05/2025     Troponins:      LFT:  Lab Results   Component Value Date    AST 23 02/05/2025    ALT 13 02/05/2025    ALKPHOS 75 02/05/2025    TP 7.3 02/05/2025    ALB 4.1 02/05/2025      No components found for: \"TSH3\"  Lab Results   Component Value Date    USL1ABWZHVBU 0.528 12/09/2024     Lab Results   Component " "Value Date    HGBA1C 5.5 10/05/2020     Lipid Profile:   Lab Results   Component Value Date    CHOLESTEROL 214 (H) 11/11/2020     11/11/2020    LDLCALC 94 11/11/2020    TRIG 73 11/11/2020     Lab Results   Component Value Date    CHOLESTEROL 214 (H) 11/11/2020    CHOLESTEROL 236 (H) 10/05/2020     Lab Results   Component Value Date    TROPONINI <0.02 10/04/2020     Lab Results   Component Value Date    NTBNP 650 (H) 11/16/2020              Dr. Ruma Emanuel MD [facility]        \"This note was completed in part utilizing [email or web address] direct voice recognition software.   Grammatical errors, random word insertion, spelling mistakes, and incomplete sentences may be an occasional consequence of the system secondary to software limitations, ambient noise and hardware issues.    Please read the chart carefully and recognize, using context, where substitutions have occurred.  If you have any questions or concerns about the context, text or information contained within the body of this dictation, please contact myself, the provider, for further clarification.\"  "

## 2025-06-12 ENCOUNTER — TELEPHONE (OUTPATIENT)
Age: OVER 89
End: 2025-06-12

## 2025-06-12 DIAGNOSIS — K21.9 GASTROESOPHAGEAL REFLUX DISEASE WITHOUT ESOPHAGITIS: Primary | ICD-10-CM

## 2025-06-12 NOTE — TELEPHONE ENCOUNTER
Patient called in stated having spasms of her diverticulitis and would like to havre dicyclomine 10mg called in, stated had prescribed before to help, not on current medication list. If unable to prescribe patient would like to be seen by Dr. Barba. No available appointments Please advise. Thank you

## 2025-06-13 RX ORDER — DICYCLOMINE HYDROCHLORIDE 10 MG/1
10 CAPSULE ORAL 3 TIMES DAILY PRN
Qty: 20 CAPSULE | Refills: 0 | Status: SHIPPED | OUTPATIENT
Start: 2025-06-13

## 2025-06-13 NOTE — TELEPHONE ENCOUNTER
Made pt's daughter aware and she stated she was very thankful Dr Barba sent this in for her mom. CHRIS

## 2025-06-13 NOTE — TELEPHONE ENCOUNTER
Reviewed chart.  Patient has been on this medication as recently as last year.  It was helpful for her then.    Prescription sent to pharmacy as she requested.  Please let patient know that the prescription was sent  Thank you,  Andreia Barba, DO

## 2025-06-15 ENCOUNTER — APPOINTMENT (EMERGENCY)
Dept: RADIOLOGY | Facility: HOSPITAL | Age: OVER 89
End: 2025-06-15
Payer: MEDICARE

## 2025-06-15 ENCOUNTER — HOSPITAL ENCOUNTER (EMERGENCY)
Facility: HOSPITAL | Age: OVER 89
Discharge: HOME | End: 2025-06-15
Attending: EMERGENCY MEDICINE
Payer: MEDICARE

## 2025-06-15 VITALS
RESPIRATION RATE: 23 BRPM | OXYGEN SATURATION: 98 % | TEMPERATURE: 97.6 F | HEART RATE: 72 BPM | DIASTOLIC BLOOD PRESSURE: 84 MMHG | SYSTOLIC BLOOD PRESSURE: 223 MMHG

## 2025-06-15 DIAGNOSIS — K59.00 CONSTIPATION: ICD-10-CM

## 2025-06-15 DIAGNOSIS — R10.9 ABDOMINAL PAIN: ICD-10-CM

## 2025-06-15 DIAGNOSIS — M89.8X5 LYTIC BONE LESION OF HIP: Primary | ICD-10-CM

## 2025-06-15 LAB
ALBUMIN SERPL BCG-MCNC: 3.8 G/DL (ref 3.5–5)
ALP SERPL-CCNC: 76 U/L (ref 34–104)
ALT SERPL W P-5'-P-CCNC: 9 U/L (ref 7–52)
ANION GAP SERPL CALCULATED.3IONS-SCNC: 8 MMOL/L (ref 4–13)
AST SERPL W P-5'-P-CCNC: 27 U/L (ref 13–39)
BACTERIA UR QL AUTO: ABNORMAL /HPF
BASOPHILS # BLD AUTO: 0.02 THOUSANDS/ÂΜL (ref 0–0.1)
BASOPHILS NFR BLD AUTO: 0 % (ref 0–1)
BILIRUB SERPL-MCNC: 0.37 MG/DL (ref 0.2–1)
BILIRUB UR QL STRIP: NEGATIVE
BUN SERPL-MCNC: 18 MG/DL (ref 5–25)
CALCIUM SERPL-MCNC: 8.5 MG/DL (ref 8.4–10.2)
CHLORIDE SERPL-SCNC: 101 MMOL/L (ref 96–108)
CLARITY UR: CLEAR
CO2 SERPL-SCNC: 28 MMOL/L (ref 21–32)
COLOR UR: ABNORMAL
CREAT SERPL-MCNC: 0.73 MG/DL (ref 0.6–1.3)
EOSINOPHIL # BLD AUTO: 0.06 THOUSAND/ÂΜL (ref 0–0.61)
EOSINOPHIL NFR BLD AUTO: 1 % (ref 0–6)
ERYTHROCYTE [DISTWIDTH] IN BLOOD BY AUTOMATED COUNT: 14.7 % (ref 11.6–15.1)
GFR SERPL CREATININE-BSD FRML MDRD: 72 ML/MIN/1.73SQ M
GLUCOSE SERPL-MCNC: 84 MG/DL (ref 65–140)
GLUCOSE UR STRIP-MCNC: NEGATIVE MG/DL
HCT VFR BLD AUTO: 36.8 % (ref 34.8–46.1)
HGB BLD-MCNC: 11.9 G/DL (ref 11.5–15.4)
HGB UR QL STRIP.AUTO: ABNORMAL
IMM GRANULOCYTES # BLD AUTO: 0.02 THOUSAND/UL (ref 0–0.2)
IMM GRANULOCYTES NFR BLD AUTO: 0 % (ref 0–2)
KETONES UR STRIP-MCNC: NEGATIVE MG/DL
LEUKOCYTE ESTERASE UR QL STRIP: NEGATIVE
LYMPHOCYTES # BLD AUTO: 1.38 THOUSANDS/ÂΜL (ref 0.6–4.47)
LYMPHOCYTES NFR BLD AUTO: 24 % (ref 14–44)
MCH RBC QN AUTO: 37.4 PG (ref 26.8–34.3)
MCHC RBC AUTO-ENTMCNC: 32.3 G/DL (ref 31.4–37.4)
MCV RBC AUTO: 116 FL (ref 82–98)
MONOCYTES # BLD AUTO: 0.46 THOUSAND/ÂΜL (ref 0.17–1.22)
MONOCYTES NFR BLD AUTO: 8 % (ref 4–12)
NEUTROPHILS # BLD AUTO: 3.72 THOUSANDS/ÂΜL (ref 1.85–7.62)
NEUTS SEG NFR BLD AUTO: 67 % (ref 43–75)
NITRITE UR QL STRIP: NEGATIVE
NON-SQ EPI CELLS URNS QL MICRO: ABNORMAL /HPF
NRBC BLD AUTO-RTO: 0 /100 WBCS
PH UR STRIP.AUTO: 6 [PH]
PLATELET # BLD AUTO: 348 THOUSANDS/UL (ref 149–390)
PMV BLD AUTO: 10.7 FL (ref 8.9–12.7)
POTASSIUM SERPL-SCNC: 4.2 MMOL/L (ref 3.5–5.3)
PROT SERPL-MCNC: 7.5 G/DL (ref 6.4–8.4)
PROT UR STRIP-MCNC: ABNORMAL MG/DL
RBC # BLD AUTO: 3.18 MILLION/UL (ref 3.81–5.12)
RBC #/AREA URNS AUTO: ABNORMAL /HPF
SODIUM SERPL-SCNC: 137 MMOL/L (ref 135–147)
SP GR UR STRIP.AUTO: 1.01 (ref 1–1.03)
UROBILINOGEN UR STRIP-ACNC: <2 MG/DL
WBC # BLD AUTO: 5.66 THOUSAND/UL (ref 4.31–10.16)
WBC #/AREA URNS AUTO: ABNORMAL /HPF

## 2025-06-15 PROCEDURE — 87086 URINE CULTURE/COLONY COUNT: CPT | Performed by: EMERGENCY MEDICINE

## 2025-06-15 PROCEDURE — 74176 CT ABD & PELVIS W/O CONTRAST: CPT

## 2025-06-15 PROCEDURE — 99284 EMERGENCY DEPT VISIT MOD MDM: CPT | Performed by: EMERGENCY MEDICINE

## 2025-06-15 PROCEDURE — 36415 COLL VENOUS BLD VENIPUNCTURE: CPT | Performed by: EMERGENCY MEDICINE

## 2025-06-15 PROCEDURE — 85025 COMPLETE CBC W/AUTO DIFF WBC: CPT | Performed by: EMERGENCY MEDICINE

## 2025-06-15 PROCEDURE — 93005 ELECTROCARDIOGRAM TRACING: CPT

## 2025-06-15 PROCEDURE — 99285 EMERGENCY DEPT VISIT HI MDM: CPT

## 2025-06-15 PROCEDURE — 81001 URINALYSIS AUTO W/SCOPE: CPT | Performed by: EMERGENCY MEDICINE

## 2025-06-15 PROCEDURE — 80053 COMPREHEN METABOLIC PANEL: CPT | Performed by: EMERGENCY MEDICINE

## 2025-06-15 RX ORDER — AMLODIPINE BESYLATE 5 MG/1
5 TABLET ORAL DAILY
COMMUNITY

## 2025-06-15 NOTE — DISCHARGE INSTRUCTIONS
CT showed findings concerning for a metastatic lesion in the left hip.  There were no abnormalities in the abdomen to be causing your symptoms.  We recommend that you follow-up with hematology oncology in the next 1 to 2 weeks for reassessment.  Call the provided number to schedule an appointment.  If you have any severe worsening abdominal pain, severe nausea and vomiting and are not able to eat or drink, significant blood in your stool, persistent fever or shaking chills, return to the emergency department immediately.  We have included your CAT scan results below.    IMPRESSION:     1. Destructive/lytic lesion involving the posterior superior left acetabulum and posteromedial left iliac bone and mixed sclerotic and lucent disease further superiorly in the left iliac bone. Findings are most suspicious for a metastatic lesion with   less likely possibilities of multiple myeloma or primary neoplasm of bone.  2. Colonic diverticulosis without evidence of acute diverticulitis.  3. The tiny bilateral renal calculi and/or medullary calcinosis without evidence of hydronephrosis.

## 2025-06-15 NOTE — ED PROVIDER NOTES
Time reflects when diagnosis was documented in both MDM as applicable and the Disposition within this note       Time User Action Codes Description Comment    6/15/2025  4:32 PM Fede Foote Add [M89.8X5] Lytic bone lesion of hip     6/15/2025  4:32 PM Fede Foote Add [R10.9] Abdominal pain     6/15/2025  4:32 PM Fede Foote Add [K59.00] Constipation           ED Disposition       ED Disposition   Discharge    Condition   Stable    Date/Time   Sun Andrés 15, 2025  4:32 PM    Comment   Blank DAVIS Rodrick discharge to home/self care.                   Assessment & Plan       Medical Decision Making  Differential diagnosis includes but is not limited to diverticulitis, colitis, gastroenteritis, nephrolithiasis, pyelonephritis, bowel obstruction or perforation, musculoskeletal pain.  I ordered and reviewed lab work including CBC, CMP, urinalysis.  I ordered and reviewed a CT abdomen pelvis.  Patient with previous severe contrast reaction so scan performed noncontrast.  CT demonstrating findings concerning for lytic lesion in the left acetabulum.  Patient and daughter informed of these findings and encouraged to follow-up with oncology in a short interval.  Patient and daughter understanding this.  CT with no intra-abdominal findings to explain symptoms.  Discharged with return precautions.    Amount and/or Complexity of Data Reviewed  Labs: ordered.  Radiology: ordered.             Medications - No data to display    ED Risk Strat Scores                    (ISAR) Identification of Seniors at Risk  Before the illness or injury that brought you to the Emergency, did you need someone to help you on a regular basis?: 0  In the last 24 hours, have you needed more help than usual?: 0  Have you been hospitalized for one or more nights during the past 6 months?: 0  In general, do you see well?: 1  In general, do you have serious problems with your memory?: 0  Do you take more than three different medications  every day?: 0  ISAR Score: 1            SBIRT 22yo+      Flowsheet Row Most Recent Value   Initial Alcohol Screen: US AUDIT-C     1. How often do you have a drink containing alcohol? 0 Filed at: 06/15/2025 5496   2. How many drinks containing alcohol do you have on a typical day you are drinking?  0 Filed at: 06/15/2025 2642   3b. FEMALE Any Age, or MALE 65+: How often do you have 4 or more drinks on one occassion? 0 Filed at: 06/15/2025 3708   Audit-C Score 0 Filed at: 06/15/2025 3652   LOVE: How many times in the past year have you...    Used an illegal drug or used a prescription medication for non-medical reasons? Never Filed at: 06/15/2025 3783                            History of Present Illness       Chief Complaint   Patient presents with    Abdominal Pain     Pt reports 4/10 LLQ pain, states she has had diverticulitis before and this feels similar. Denies bowel issues or associated urinary symptoms       Past Medical History[1]   Past Surgical History[2]   Family History[3]   Social History[4]   E-Cigarette/Vaping    E-Cigarette Use Never User       E-Cigarette/Vaping Substances    Nicotine No     THC No     CBD No     Flavoring No     Other No     Unknown No       I have reviewed and agree with the history as documented.     Patient is a 89-year-old female history of sick sinus syndrome status post pacemaker placement, paroxysmal atrial fibrillation primarily in sinus rhythm, hypertension, hyperlipidemia, GERD presenting for evaluation of abdominal bloating, weakness.  Patient states symptoms for about the past week, states mild to moderate pain throughout the entire abdomen but most pronounced in the left lower quadrant.  Patient states intermittent constipation, denies diarrhea, denies blood per rectum, melena, mucoid stool.  Patient states some intermittent mild nausea without vomiting.  Patient denies chest pain, shortness of breath, fevers, chills, does state some fatigue.  Patient with family  members with a GI bug earlier in the week.  Patient with prior history of diverticulitis, states that this feels similar.  Patient denies dysuria, hematuria, urinary frequency, flank pain, difficulty urinating.        Review of Systems   Constitutional:  Positive for fatigue. Negative for chills and fever.   Respiratory:  Negative for cough and shortness of breath.    Gastrointestinal:  Positive for abdominal pain and constipation. Negative for diarrhea, nausea and vomiting.   Genitourinary:  Negative for dysuria, flank pain and frequency.   Musculoskeletal:  Negative for arthralgias and myalgias.   Neurological:  Negative for weakness and numbness.   Psychiatric/Behavioral:  Negative for confusion.    All other systems reviewed and are negative.          Objective       ED Triage Vitals [06/15/25 1323]   Temperature Pulse Blood Pressure Respirations SpO2 Patient Position - Orthostatic VS   97.6 °F (36.4 °C) 79 (!) 214/96 16 97 % Sitting      Temp Source Heart Rate Source BP Location FiO2 (%) Pain Score    Tympanic Monitor Right arm -- 4      Vitals      Date and Time Temp Pulse SpO2 Resp BP Pain Score FACES Pain Rating User   06/15/25 1601 -- 60 98 % 18 186/77 -- --    06/15/25 1546 -- 60 97 % 23 199/84 -- --    06/15/25 1501 -- 75 98 % 19 184/79 -- --    06/15/25 1401 -- 60 99 % -- 199/79 -- --    06/15/25 1323 97.6 °F (36.4 °C) 79 97 % 16 214/96 4 -- ERIC            Physical Exam  Vitals and nursing note reviewed.   Constitutional:       General: She is not in acute distress.     Appearance: Normal appearance. She is not ill-appearing, toxic-appearing or diaphoretic.      Comments: Well-appearing, nontoxic, nondistressed   HENT:      Head: Normocephalic and atraumatic.      Comments: Moist mucous membranes     Right Ear: External ear normal.      Left Ear: External ear normal.     Eyes:      General:         Right eye: No discharge.         Left eye: No discharge.       Cardiovascular:      Comments:  Regular rate and rhythm, no murmurs rubs or gallops.  Extremities warm and well-perfused without mottling  Pulmonary:      Effort: No respiratory distress.      Comments: No increased work of breathing.  Speaking in complete sentences.  Lungs clear to auscultation bilaterally without wheezes, rales, rhonchi.  Satting 97% on room air indicating adequate oxygenation.  Abdominal:      General: There is no distension.      Tenderness: There is abdominal tenderness.      Comments: Abdomen mildly distended with normal bowel sounds.  Tenderness most pronounced in the left lower quadrant.  No rigidity, rebound, guarding     Musculoskeletal:         General: No deformity.      Cervical back: Normal range of motion.     Skin:     Findings: No lesion or rash.     Neurological:      Mental Status: She is alert and oriented to person, place, and time. Mental status is at baseline.      Comments: AAO x 3, pleasant, interactive   Psychiatric:         Mood and Affect: Mood and affect normal.         Results Reviewed       Procedure Component Value Units Date/Time    Urinalysis with microscopic [[id number]]  (Abnormal) Collected: 06/15/25 1421    Lab Status: Final result Specimen: Urine, Clean Catch Updated: 06/15/25 1511     Color, UA Light Yellow     Clarity, UA Clear     Specific Gravity, UA 1.010     pH, UA 6.0     Leukocytes, UA Negative     Nitrite, UA Negative     Protein, UA 30 (1+) mg/dl      Glucose, UA Negative mg/dl      Ketones, UA Negative mg/dl      Urobilinogen, UA <2.0 mg/dl      Bilirubin, UA Negative     Occult Blood, UA Trace     RBC, UA 0-1 /hpf      WBC, UA 0-1 /hpf      Epithelial Cells Occasional /hpf      Bacteria, UA Occasional /hpf     Comprehensive metabolic panel [[id number]] Collected: 06/15/25 1420    Lab Status: Final result Specimen: Blood from Arm, Right Updated: 06/15/25 1450     Sodium 137 mmol/L      Potassium 4.2 mmol/L      Chloride 101 mmol/L      CO2 28 mmol/L      ANION GAP 8 mmol/L      BUN  18 mg/dL      Creatinine 0.73 mg/dL      Glucose 84 mg/dL      Calcium 8.5 mg/dL      AST 27 U/L      ALT 9 U/L      Alkaline Phosphatase 76 U/L      Total Protein 7.5 g/dL      Albumin 3.8 g/dL      Total Bilirubin 0.37 mg/dL      eGFR 72 ml/min/1.73sq m     Narrative:      National Kidney Disease Foundation guidelines for Chronic Kidney Disease (CKD):     Stage 1 with normal or high GFR (GFR > 90 mL/min/1.73 square meters)    Stage 2 Mild CKD (GFR = 60-89 mL/min/1.73 square meters)    Stage 3A Moderate CKD (GFR = 45-59 mL/min/1.73 square meters)    Stage 3B Moderate CKD (GFR = 30-44 mL/min/1.73 square meters)    Stage 4 Severe CKD (GFR = 15-29 mL/min/1.73 square meters)    Stage 5 End Stage CKD (GFR <15 mL/min/1.73 square meters)  Note: GFR calculation is accurate only with a steady state creatinine    CBC and differential [[id number]]  (Abnormal) Collected: 06/15/25 1420    Lab Status: Final result Specimen: Blood from Arm, Right Updated: 06/15/25 1426     WBC 5.66 Thousand/uL      RBC 3.18 Million/uL      Hemoglobin 11.9 g/dL      Hematocrit 36.8 %       fL      MCH 37.4 pg      MCHC 32.3 g/dL      RDW 14.7 %      MPV 10.7 fL      Platelets 348 Thousands/uL      nRBC 0 /100 WBCs      Segmented % 67 %      Immature Grans % 0 %      Lymphocytes % 24 %      Monocytes % 8 %      Eosinophils Relative 1 %      Basophils Relative 0 %      Absolute Neutrophils 3.72 Thousands/µL      Absolute Immature Grans 0.02 Thousand/uL      Absolute Lymphocytes 1.38 Thousands/µL      Absolute Monocytes 0.46 Thousand/µL      Eosinophils Absolute 0.06 Thousand/µL      Basophils Absolute 0.02 Thousands/µL     Urine culture [[id number]] Collected: 06/15/25 1421    Lab Status: In process Specimen: Urine, Clean Catch Updated: 06/15/25 1425            CT abdomen pelvis wo contrast   Final Interpretation by Yobany Orozco MD (06/15 1603)      1. Destructive/lytic lesion involving the posterior superior left acetabulum and posteromedial  left iliac bone and mixed sclerotic and lucent disease further superiorly in the left iliac bone. Findings are most suspicious for a metastatic lesion with    less likely possibilities of multiple myeloma or primary neoplasm of bone.   2. Colonic diverticulosis without evidence of acute diverticulitis.   3. The tiny bilateral renal calculi and/or medullary calcinosis without evidence of hydronephrosis.      I personally discussed this study with DR. VALERIE SALAZAR on 6/15/2025 4:00 PM.            Workstation performed: [id number]             Procedures    ED Medication and Procedure Management   Prior to Admission Medications   Prescriptions Last Dose Informant Patient Reported? Taking?   Cholecalciferol 25 MCG (1000 UT) CHEW 6/15/2025 Morning  Yes Yes   Sig: Chew   acetaminophen (TYLENOL) 325 mg tablet Past Week  No Yes   Sig: Take 2 tablets (650 mg total) by mouth every 6 (six) hours as needed for mild pain, fever or headaches   amLODIPine (NORVASC) 5 mg tablet 6/14/2025  Yes Yes   Sig: Take 5 mg by mouth in the evening.   apixaban (Eliquis) 2.5 mg 6/15/2025 Morning  No Yes   Sig: Take 1 tablet by mouth twice daily   aspirin (ECOTRIN LOW STRENGTH) 81 mg EC tablet 6/15/2025 Morning Self No Yes   Sig: Take 1 tablet (81 mg total) by mouth daily   cyanocobalamin (VITAMIN B-12) 500 MCG tablet 6/15/2025 Morning  No Yes   Sig: Take 0.5 tablets (250 mcg total) by mouth daily   dicyclomine (BENTYL) 10 mg capsule 6/15/2025 Morning  No Yes   Sig: Take 1 capsule (10 mg total) by mouth 3 (three) times a day as needed (abdominal spasm)   hydroxyurea (HYDREA) 500 mg capsule 6/14/2025 Evening  Yes Yes   Sig: in the morning.   levothyroxine 75 mcg tablet 6/15/2025 Morning  No Yes   Sig: Take 1 tablet (75 mcg total) by mouth daily   metoprolol succinate (TOPROL-XL) 50 mg 24 hr tablet 6/15/2025 Morning  No Yes   Sig: Take 1 tablet (50 mg total) by mouth 2 (two) times a day   omeprazole (PriLOSEC) 40 MG capsule 6/15/2025 Morning   No Yes   Sig: Take 1 capsule (40 mg total) by mouth daily   rosuvastatin (CRESTOR) 40 MG tablet 6/14/2025 Evening  No Yes   Sig: Take 1 tablet (40 mg total) by mouth every evening      Facility-Administered Medications: None     Patient's Medications   Discharge Prescriptions    No medications on file     No discharge procedures on file.  ED SEPSIS DOCUMENTATION   Time reflects when diagnosis was documented in both MDM as applicable and the Disposition within this note       Time User Action Codes Description Comment    6/15/2025  4:32 PM Fede Foote [M89.8X5] Lytic bone lesion of hip     6/15/2025  4:32 PM Fede Foote [R10.9] Abdominal pain     6/15/2025  4:32 PM Fede Foote [K59.00] Constipation                      [1]   Past Medical History:  Diagnosis Date    Anxiety     GERD (gastroesophageal reflux disease)     Hypertension     Hypothyroid     Vertigo    [2]   Past Surgical History:  Procedure Laterality Date    BREAST BIOPSY Left 1974    CARDIAC ELECTROPHYSIOLOGY PROCEDURE Left 12/12/2024    Procedure: Cardiac pacer implant;  Surgeon: Ruma Emanuel MD;  Location: [facility];  Service: Cardiology    COLONOSCOPY  04/2018    HYSTERECTOMY     [3]   Family History  Problem Relation Name Age of Onset    Thrombosis Mother      Cancer Father      Diabetes Neg Hx     [4]   Social History  Tobacco Use    Smoking status: Former     Passive exposure: Never    Smokeless tobacco: Never   Vaping Use    Vaping status: Never Used   Substance Use Topics    Alcohol use: Not Currently    Drug use: No        Fede Foote MD  06/15/25 5586

## 2025-06-16 LAB
ATRIAL RATE: 64 BPM
BACTERIA UR CULT: NORMAL
P AXIS: 80 DEGREES
PR INTERVAL: 130 MS
QRS AXIS: 19 DEGREES
QRSD INTERVAL: 74 MS
QT INTERVAL: 424 MS
QTC INTERVAL: 437 MS
T WAVE AXIS: 45 DEGREES
VENTRICULAR RATE: 64 BPM

## 2025-06-16 PROCEDURE — 93010 ELECTROCARDIOGRAM REPORT: CPT | Performed by: INTERNAL MEDICINE

## 2025-06-17 ENCOUNTER — TELEPHONE (OUTPATIENT)
Age: OVER 89
End: 2025-06-17

## 2025-06-17 DIAGNOSIS — M89.9 BONE LESION: Primary | ICD-10-CM

## 2025-06-17 NOTE — TELEPHONE ENCOUNTER
Returned call to Padmini    I put in the order for interventional radiology.  They will call to set up Blank to set up the appointment.   Voice mail left that I sent a [employer] message  Andreia Barba,

## 2025-06-17 NOTE — TELEPHONE ENCOUNTER
Pt's hematologist is in [facility] network. Pt had a CT ab done at . Pt needs a CT guided biopsy of her abdomen. Hematologist does not have privileges at  so she suggested pt's pcp order the test since she said it was better that both CT scans are done in . Pt cannot have dye with this CT scan. Please, advise.

## 2025-06-17 NOTE — TELEPHONE ENCOUNTER
Pt's daughter  Padmini was given the message as per PCP. Padmini stated please contact the cell# [phone] if no one answers please contact the [phone].     Padmini also stated that she is very grateful for Dr. Barba's help. Thank you.

## 2025-06-19 ENCOUNTER — PREP FOR PROCEDURE (OUTPATIENT)
Dept: INTERVENTIONAL RADIOLOGY/VASCULAR | Facility: CLINIC | Age: OVER 89
End: 2025-06-19

## 2025-06-19 DIAGNOSIS — M89.9 BONE LESION: Primary | ICD-10-CM

## 2025-06-23 ENCOUNTER — TELEPHONE (OUTPATIENT)
Age: OVER 89
End: 2025-06-23

## 2025-06-23 NOTE — TELEPHONE ENCOUNTER
Caller: Blank    Doctor/Office: Dr Mendez Test     Call regarding :  cancel stress test      Call was transferred to: Central scheduling

## 2025-07-07 DIAGNOSIS — I48.91 ATRIAL FIBRILLATION WITH RAPID VENTRICULAR RESPONSE (HCC): ICD-10-CM

## 2025-07-07 NOTE — PRE-PROCEDURE INSTRUCTIONS
Pre-procedure Instructions for Interventional Radiology  [address]  [address]  INTERVENTIONAL RADIOLOGY [phone]    You are scheduled for a/an bone biopsy.  On 7/10.    Your arrival time is 0815.        To prepare for your procedure:  Please arrange for someone to drive you home after the procedure and stay with you until the next morning if you are instructed to do so.  This is typically for patients receiving some type of sedative or anesthetic for the procedure.  DO NOT EAT OR DRINK ANYTHING after midnight on the evening before your procedure including candy & gum.  ONLY SIPS OF WATER with your medications are allowed on the morning of your procedure.  TAKE ALL OF YOUR REGULAR MEDICATIONS THE MORNING OF YOUR PROCEDURE with sips of water!  We may call you to stop some of your blood sugar, blood pressure and blood thinning medications depending on the procedure.  Please take all of these medications unless we instruct you to stop them.  If you have an allergy to x-ray dye, please contact Interventional Radiology for an x-ray dye preparation which usually consists of an oral steroid and Benadryl.  If you wear a Glucose Monitor, you may be asked to remove it for your procedure if we are using x-ray.  These devices need to be removed when we are imaging with x-ray near the device since the radiation can cause the unit to malfunction.  If possible and not too inconvenient, you may want to schedule your exam closer to day 14 of your 14 day device so your device is not wasted.    The day of your procedure:  Bring a list of the medications you take at home.  Bring medications you take for breathing problems (such as inhalers), medications for chest pain, or both.  Bring a case for your glasses or contacts.  Bring your insurance card and a form of photo ID.  Please leave all valuables such as credit cards and jewelry at home.  Report to the registration desk in the main  [facility]den at the [facility].  Ask to be directed to the Short Procedure Unit on the 2nd floor.  While your procedure is being performed, your family may wait in the Waiting Room on the 2nd floor. If they need to leave, they may provide a number to be called following the procedure.   Be prepared to stay overnight just in case. Sometimes procedures will indicate the need for further observation or treatment.   If you are scheduled for a follow-up visit with the Interventional Radiologist after your procedure, you will be called with a date and time.  Special Instructions (Medications to stop taking before your procedure etc.):   Spoke with daughter Tiffanie  Stop aspirin, last dose 7/4  Stop eliquis last dose 7/7

## 2025-07-09 ENCOUNTER — APPOINTMENT (OUTPATIENT)
Dept: RADIOLOGY | Facility: HOSPITAL | Age: OVER 89
End: 2025-07-09
Payer: MEDICARE

## 2025-07-09 ENCOUNTER — HOSPITAL ENCOUNTER (OUTPATIENT)
Facility: HOSPITAL | Age: OVER 89
Setting detail: OBSERVATION
Discharge: HOME | End: 2025-07-10
Attending: STUDENT IN AN ORGANIZED HEALTH CARE EDUCATION/TRAINING PROGRAM
Payer: MEDICARE

## 2025-07-09 ENCOUNTER — TELEPHONE (OUTPATIENT)
Dept: RADIOLOGY | Facility: HOSPITAL | Age: OVER 89
End: 2025-07-09

## 2025-07-09 ENCOUNTER — APPOINTMENT (EMERGENCY)
Dept: RADIOLOGY | Facility: HOSPITAL | Age: OVER 89
End: 2025-07-09
Payer: MEDICARE

## 2025-07-09 DIAGNOSIS — I65.23 BILATERAL CAROTID ARTERY STENOSIS: Primary | ICD-10-CM

## 2025-07-09 DIAGNOSIS — R29.90 STROKE-LIKE SYMPTOMS: ICD-10-CM

## 2025-07-09 DIAGNOSIS — I10 HYPERTENSION: ICD-10-CM

## 2025-07-09 DIAGNOSIS — R29.90 STROKE-LIKE EPISODE: ICD-10-CM

## 2025-07-09 DIAGNOSIS — I10 ACCELERATED HYPERTENSION: ICD-10-CM

## 2025-07-09 DIAGNOSIS — R51.9 HEADACHE: ICD-10-CM

## 2025-07-09 DIAGNOSIS — J01.90 ACUTE SINUSITIS: ICD-10-CM

## 2025-07-09 PROBLEM — G43.909 MIGRAINE: Status: ACTIVE | Noted: 2025-07-09

## 2025-07-09 PROBLEM — I48.91 ATRIAL FIBRILLATION (HCC): Status: ACTIVE | Noted: 2025-07-09

## 2025-07-09 LAB
2HR DELTA HS TROPONIN: 1 NG/L
4HR DELTA HS TROPONIN: 2 NG/L
ANION GAP SERPL CALCULATED.3IONS-SCNC: 7 MMOL/L (ref 4–13)
BACTERIA UR QL AUTO: NORMAL /HPF
BASOPHILS # BLD AUTO: 0.02 THOUSANDS/ÂΜL (ref 0–0.1)
BASOPHILS NFR BLD AUTO: 0 % (ref 0–1)
BILIRUB UR QL STRIP: NEGATIVE
BUN SERPL-MCNC: 16 MG/DL (ref 5–25)
CALCIUM SERPL-MCNC: 9.2 MG/DL (ref 8.4–10.2)
CARDIAC TROPONIN I PNL SERPL HS: 6 NG/L (ref ?–50)
CARDIAC TROPONIN I PNL SERPL HS: 7 NG/L (ref ?–50)
CARDIAC TROPONIN I PNL SERPL HS: 8 NG/L (ref ?–50)
CHLORIDE SERPL-SCNC: 101 MMOL/L (ref 96–108)
CHOLEST SERPL-MCNC: 133 MG/DL (ref ?–200)
CLARITY UR: CLEAR
CO2 SERPL-SCNC: 28 MMOL/L (ref 21–32)
COLOR UR: COLORLESS
CREAT SERPL-MCNC: 0.82 MG/DL (ref 0.6–1.3)
EOSINOPHIL # BLD AUTO: 0.05 THOUSAND/ÂΜL (ref 0–0.61)
EOSINOPHIL NFR BLD AUTO: 1 % (ref 0–6)
ERYTHROCYTE [DISTWIDTH] IN BLOOD BY AUTOMATED COUNT: 15.5 % (ref 11.6–15.1)
EST. AVERAGE GLUCOSE BLD GHB EST-MCNC: 111 MG/DL
GFR SERPL CREATININE-BSD FRML MDRD: 62 ML/MIN/1.73SQ M
GLUCOSE SERPL-MCNC: 92 MG/DL (ref 65–140)
GLUCOSE UR STRIP-MCNC: NEGATIVE MG/DL
HBA1C MFR BLD: 5.5 %
HCT VFR BLD AUTO: 36.4 % (ref 34.8–46.1)
HDLC SERPL-MCNC: 59 MG/DL
HGB BLD-MCNC: 11.5 G/DL (ref 11.5–15.4)
HGB UR QL STRIP.AUTO: ABNORMAL
IMM GRANULOCYTES # BLD AUTO: 0.01 THOUSAND/UL (ref 0–0.2)
IMM GRANULOCYTES NFR BLD AUTO: 0 % (ref 0–2)
KETONES UR STRIP-MCNC: NEGATIVE MG/DL
LDLC SERPL CALC-MCNC: 51 MG/DL (ref 0–100)
LEUKOCYTE ESTERASE UR QL STRIP: NEGATIVE
LYMPHOCYTES # BLD AUTO: 1.1 THOUSANDS/ÂΜL (ref 0.6–4.47)
LYMPHOCYTES NFR BLD AUTO: 23 % (ref 14–44)
MCH RBC QN AUTO: 37.7 PG (ref 26.8–34.3)
MCHC RBC AUTO-ENTMCNC: 31.6 G/DL (ref 31.4–37.4)
MCV RBC AUTO: 119 FL (ref 82–98)
MONOCYTES # BLD AUTO: 0.37 THOUSAND/ÂΜL (ref 0.17–1.22)
MONOCYTES NFR BLD AUTO: 8 % (ref 4–12)
NEUTROPHILS # BLD AUTO: 3.17 THOUSANDS/ÂΜL (ref 1.85–7.62)
NEUTS SEG NFR BLD AUTO: 68 % (ref 43–75)
NITRITE UR QL STRIP: NEGATIVE
NON-SQ EPI CELLS URNS QL MICRO: NORMAL /HPF
NRBC BLD AUTO-RTO: 0 /100 WBCS
PH UR STRIP.AUTO: 6.5 [PH]
PLATELET # BLD AUTO: 252 THOUSANDS/UL (ref 149–390)
PMV BLD AUTO: 10.4 FL (ref 8.9–12.7)
POTASSIUM SERPL-SCNC: 3.9 MMOL/L (ref 3.5–5.3)
PROT UR STRIP-MCNC: NEGATIVE MG/DL
RBC # BLD AUTO: 3.05 MILLION/UL (ref 3.81–5.12)
RBC #/AREA URNS AUTO: NORMAL /HPF
SODIUM SERPL-SCNC: 136 MMOL/L (ref 135–147)
SP GR UR STRIP.AUTO: <1.005 (ref 1–1.03)
TRIGL SERPL-MCNC: 115 MG/DL (ref ?–150)
UROBILINOGEN UR STRIP-ACNC: <2 MG/DL
WBC # BLD AUTO: 4.72 THOUSAND/UL (ref 4.31–10.16)
WBC #/AREA URNS AUTO: NORMAL /HPF

## 2025-07-09 PROCEDURE — 99284 EMERGENCY DEPT VISIT MOD MDM: CPT

## 2025-07-09 PROCEDURE — 99285 EMERGENCY DEPT VISIT HI MDM: CPT | Performed by: STUDENT IN AN ORGANIZED HEALTH CARE EDUCATION/TRAINING PROGRAM

## 2025-07-09 PROCEDURE — 93005 ELECTROCARDIOGRAM TRACING: CPT

## 2025-07-09 PROCEDURE — 84484 ASSAY OF TROPONIN QUANT: CPT | Performed by: STUDENT IN AN ORGANIZED HEALTH CARE EDUCATION/TRAINING PROGRAM

## 2025-07-09 PROCEDURE — 85025 COMPLETE CBC W/AUTO DIFF WBC: CPT | Performed by: STUDENT IN AN ORGANIZED HEALTH CARE EDUCATION/TRAINING PROGRAM

## 2025-07-09 PROCEDURE — 70450 CT HEAD/BRAIN W/O DYE: CPT

## 2025-07-09 PROCEDURE — 80061 LIPID PANEL: CPT

## 2025-07-09 PROCEDURE — 96375 TX/PRO/DX INJ NEW DRUG ADDON: CPT

## 2025-07-09 PROCEDURE — 99222 1ST HOSP IP/OBS MODERATE 55: CPT

## 2025-07-09 PROCEDURE — 96361 HYDRATE IV INFUSION ADD-ON: CPT

## 2025-07-09 PROCEDURE — 36415 COLL VENOUS BLD VENIPUNCTURE: CPT | Performed by: STUDENT IN AN ORGANIZED HEALTH CARE EDUCATION/TRAINING PROGRAM

## 2025-07-09 PROCEDURE — 84484 ASSAY OF TROPONIN QUANT: CPT

## 2025-07-09 PROCEDURE — 81001 URINALYSIS AUTO W/SCOPE: CPT | Performed by: STUDENT IN AN ORGANIZED HEALTH CARE EDUCATION/TRAINING PROGRAM

## 2025-07-09 PROCEDURE — 96374 THER/PROPH/DIAG INJ IV PUSH: CPT

## 2025-07-09 PROCEDURE — 83036 HEMOGLOBIN GLYCOSYLATED A1C: CPT

## 2025-07-09 PROCEDURE — 80048 BASIC METABOLIC PNL TOTAL CA: CPT | Performed by: STUDENT IN AN ORGANIZED HEALTH CARE EDUCATION/TRAINING PROGRAM

## 2025-07-09 RX ORDER — ACETAMINOPHEN 10 MG/ML
1000 INJECTION, SOLUTION INTRAVENOUS EVERY 6 HOURS PRN
Status: DISCONTINUED | OUTPATIENT
Start: 2025-07-09 | End: 2025-07-09

## 2025-07-09 RX ORDER — SUMATRIPTAN 6 MG/.5ML
6 INJECTION, SOLUTION SUBCUTANEOUS EVERY 8 HOURS PRN
Status: DISCONTINUED | OUTPATIENT
Start: 2025-07-09 | End: 2025-07-09

## 2025-07-09 RX ORDER — HYDRALAZINE HYDROCHLORIDE 20 MG/ML
5 INJECTION INTRAMUSCULAR; INTRAVENOUS EVERY 6 HOURS PRN
Status: DISCONTINUED | OUTPATIENT
Start: 2025-07-09 | End: 2025-07-10 | Stop reason: HOSPADM

## 2025-07-09 RX ORDER — DIPHENHYDRAMINE HYDROCHLORIDE 50 MG/ML
25 INJECTION, SOLUTION INTRAMUSCULAR; INTRAVENOUS ONCE
Status: COMPLETED | OUTPATIENT
Start: 2025-07-09 | End: 2025-07-09

## 2025-07-09 RX ORDER — ATORVASTATIN CALCIUM 40 MG/1
40 TABLET, FILM COATED ORAL
Status: DISCONTINUED | OUTPATIENT
Start: 2025-07-09 | End: 2025-07-10 | Stop reason: HOSPADM

## 2025-07-09 RX ORDER — DIPHENHYDRAMINE HYDROCHLORIDE 50 MG/ML
25 INJECTION, SOLUTION INTRAMUSCULAR; INTRAVENOUS EVERY 6 HOURS PRN
Status: DISCONTINUED | OUTPATIENT
Start: 2025-07-09 | End: 2025-07-10 | Stop reason: HOSPADM

## 2025-07-09 RX ORDER — ACETAMINOPHEN 325 MG/1
650 TABLET ORAL EVERY 6 HOURS PRN
Status: DISCONTINUED | OUTPATIENT
Start: 2025-07-09 | End: 2025-07-10 | Stop reason: HOSPADM

## 2025-07-09 RX ORDER — ENOXAPARIN SODIUM 100 MG/ML
40 INJECTION SUBCUTANEOUS DAILY
Status: DISCONTINUED | OUTPATIENT
Start: 2025-07-09 | End: 2025-07-09

## 2025-07-09 RX ORDER — AMLODIPINE BESYLATE 5 MG/1
5 TABLET ORAL DAILY
Status: DISCONTINUED | OUTPATIENT
Start: 2025-07-09 | End: 2025-07-09

## 2025-07-09 RX ORDER — AMLODIPINE BESYLATE 5 MG/1
5 TABLET ORAL ONCE
Status: DISCONTINUED | OUTPATIENT
Start: 2025-07-09 | End: 2025-07-09

## 2025-07-09 RX ORDER — HYDRALAZINE HYDROCHLORIDE 20 MG/ML
5 INJECTION INTRAMUSCULAR; INTRAVENOUS ONCE
Status: COMPLETED | OUTPATIENT
Start: 2025-07-09 | End: 2025-07-09

## 2025-07-09 RX ORDER — LEVOTHYROXINE SODIUM 75 UG/1
75 TABLET ORAL
Status: DISCONTINUED | OUTPATIENT
Start: 2025-07-10 | End: 2025-07-10 | Stop reason: HOSPADM

## 2025-07-09 RX ORDER — METOCLOPRAMIDE HYDROCHLORIDE 5 MG/ML
10 INJECTION INTRAMUSCULAR; INTRAVENOUS ONCE
Status: COMPLETED | OUTPATIENT
Start: 2025-07-09 | End: 2025-07-09

## 2025-07-09 RX ORDER — HYDROXYUREA 500 MG/1
500 CAPSULE ORAL DAILY
Status: DISCONTINUED | OUTPATIENT
Start: 2025-07-09 | End: 2025-07-10 | Stop reason: HOSPADM

## 2025-07-09 RX ORDER — METOPROLOL SUCCINATE 50 MG/1
50 TABLET, EXTENDED RELEASE ORAL 2 TIMES DAILY
Status: DISCONTINUED | OUTPATIENT
Start: 2025-07-09 | End: 2025-07-10 | Stop reason: HOSPADM

## 2025-07-09 RX ORDER — PANTOPRAZOLE SODIUM 40 MG/1
40 TABLET, DELAYED RELEASE ORAL
Status: DISCONTINUED | OUTPATIENT
Start: 2025-07-10 | End: 2025-07-10 | Stop reason: HOSPADM

## 2025-07-09 RX ORDER — SUMATRIPTAN 6 MG/.5ML
6 INJECTION, SOLUTION SUBCUTANEOUS
Status: DISCONTINUED | OUTPATIENT
Start: 2025-07-09 | End: 2025-07-09

## 2025-07-09 RX ORDER — ASPIRIN 81 MG/1
81 TABLET, CHEWABLE ORAL DAILY
Status: DISCONTINUED | OUTPATIENT
Start: 2025-07-09 | End: 2025-07-10 | Stop reason: HOSPADM

## 2025-07-09 RX ORDER — HYDRALAZINE HYDROCHLORIDE 20 MG/ML
5 INJECTION INTRAMUSCULAR; INTRAVENOUS ONCE
Status: DISCONTINUED | OUTPATIENT
Start: 2025-07-09 | End: 2025-07-09

## 2025-07-09 RX ORDER — APIXABAN 2.5 MG/1
2.5 TABLET, FILM COATED ORAL 2 TIMES DAILY
Qty: 60 TABLET | Refills: 2 | Status: SHIPPED | OUTPATIENT
Start: 2025-07-09

## 2025-07-09 RX ORDER — AMLODIPINE BESYLATE 10 MG/1
10 TABLET ORAL DAILY
Status: DISCONTINUED | OUTPATIENT
Start: 2025-07-10 | End: 2025-07-10

## 2025-07-09 RX ORDER — LABETALOL HYDROCHLORIDE 5 MG/ML
10 INJECTION, SOLUTION INTRAVENOUS ONCE
Status: COMPLETED | OUTPATIENT
Start: 2025-07-09 | End: 2025-07-09

## 2025-07-09 RX ADMIN — DIPHENHYDRAMINE HYDROCHLORIDE 25 MG: 50 INJECTION, SOLUTION INTRAMUSCULAR; INTRAVENOUS at 11:57

## 2025-07-09 RX ADMIN — HYDRALAZINE HYDROCHLORIDE 5 MG: 20 INJECTION INTRAMUSCULAR; INTRAVENOUS at 16:19

## 2025-07-09 RX ADMIN — METOPROLOL SUCCINATE 50 MG: 50 TABLET, EXTENDED RELEASE ORAL at 17:59

## 2025-07-09 RX ADMIN — APIXABAN 2.5 MG: 2.5 TABLET, FILM COATED ORAL at 16:18

## 2025-07-09 RX ADMIN — SODIUM CHLORIDE 500 ML: 0.9 INJECTION, SOLUTION INTRAVENOUS at 10:47

## 2025-07-09 RX ADMIN — AMLODIPINE BESYLATE 5 MG: 5 TABLET ORAL at 16:22

## 2025-07-09 RX ADMIN — HYDROXYUREA 500 MG: 500 CAPSULE ORAL at 16:24

## 2025-07-09 RX ADMIN — METOCLOPRAMIDE HYDROCHLORIDE 10 MG: 5 INJECTION INTRAMUSCULAR; INTRAVENOUS at 10:48

## 2025-07-09 RX ADMIN — ATORVASTATIN CALCIUM 40 MG: 40 TABLET, FILM COATED ORAL at 16:18

## 2025-07-09 RX ADMIN — ASPIRIN 81 MG: 81 TABLET, CHEWABLE ORAL at 16:22

## 2025-07-09 RX ADMIN — LABETALOL HYDROCHLORIDE 10 MG: 5 INJECTION, SOLUTION INTRAVENOUS at 13:17

## 2025-07-09 RX ADMIN — ACETAMINOPHEN 650 MG: 325 TABLET, FILM COATED ORAL at 23:03

## 2025-07-09 NOTE — ASSESSMENT & PLAN NOTE
Reports onset of frontal headache that is throbbing associated with vision changes described as seeing floaters.  Headache started on Monday lasted several hours and self-resolved.  Recurred on Tuesday for the same duration and again recurred this morning.  Headache is moderate in intensity.  Does not radiate.  No associated auditory changes, nausea or vomiting, lightheadedness or dizziness.  No palpitations or chest pain or shortness of breath.     Patient reports remote history of migraines however have not had any in several years.  She was recently placed off Eliquis/aspirin for her biopsy on 7/10/2025.    ASA/Eliquis on hold for biopsy tomorrow.     CT head; left maxillary sinus fluid otherwise no acute findings.    Plan:   Check CTA head and neck  Restart aspirin and Eliquis  Check MRI brain  Check updated echo with bubble study  Neurochecks  Keep on stroke pathway  Start telemetry  Neurology on consult; appreciate input

## 2025-07-09 NOTE — ED NOTES
"Patient reports feeling \"jittery\". Dr. Parikh made aware. Awaiting new orders.     Patricia Muse RN  07/09/25 1146    "

## 2025-07-09 NOTE — Clinical Note
Case was discussed with  and the patient's admission status was agreed to be Admission Status: observation status to the service of Dr. Howard

## 2025-07-09 NOTE — LETTER
Thank you for allowing us to participate in the care of your patient, Blank Mensah, who was hospitalized from [unfilled] through 7/10/2025 with the admitting diagnosis of acute sinusitis, migraine headaches ruled out TIA.  MRI brain negative.  Showing maxillary sinusitis only..  Patient is being discharged will complete Augmentin course.  Neurologist cleared the patient for discharge.  Recommended discussing with PCP the need for Lovenox bridging while holding Eliquis for biopsy given cannot rule out TIA.    Medication Changes:  Augmentin twice daily for 7 days  Amlodipine increased to 10 mg OD    Outpatient testing recommended:  Check BP daily    If you have any additional questions or would like to discuss further, please feel free to contact me.    Esther Farr MD  [facility] Internal Medicine, Hospitalist  [phone]

## 2025-07-09 NOTE — ASSESSMENT & PLAN NOTE
On eliquis and metoprolol.   Pacemaker for SSS     Follows up with  cardiology.   Monitor on telemetry

## 2025-07-09 NOTE — PLAN OF CARE
Problem: PAIN - ADULT  Goal: Verbalizes/displays adequate comfort level or baseline comfort level  Description: Interventions:  - Encourage patient to monitor pain and request assistance  - Assess pain using appropriate pain scale  - Administer analgesics as ordered based on type and severity of pain and evaluate response  - Implement non-pharmacological measures as appropriate and evaluate response  - Consider cultural and social influences on pain and pain management  - Notify physician/advanced practitioner if interventions unsuccessful or patient reports new pain  - Educate patient/family on pain management process including their role and importance of  reporting pain   - Provide non-pharmacologic/complimentary pain relief interventions  Outcome: Progressing     Problem: INFECTION - ADULT  Goal: Absence or prevention of progression during hospitalization  Description: INTERVENTIONS:  - Assess and monitor for signs and symptoms of infection  - Monitor lab/diagnostic results  - Monitor all insertion sites, i.e. indwelling lines, tubes, and drains  - Monitor endotracheal if appropriate and nasal secretions for changes in amount and color  - [place] appropriate cooling/warming therapies per order  - Administer medications as ordered  - Instruct and encourage patient and family to use good hand hygiene technique  - Identify and instruct in appropriate isolation precautions for identified infection/condition  Outcome: Progressing  Goal: Absence of fever/infection during neutropenic period  Description: INTERVENTIONS:  - Monitor WBC  - Perform strict hand hygiene  Outcome: Progressing     Problem: SAFETY ADULT  Goal: Patient will remain free of falls  Description: INTERVENTIONS:  - Educate patient/family on patient safety including physical limitations  - Instruct patient to call for assistance with activity   - Consider consulting OT/PT to assist with strengthening/mobility based on AM PAC & JH-HLM score  -  Consult OT/PT to assist with strengthening/mobility   - Keep Call bell within reach  - Keep bed low and locked with side rails adjusted as appropriate  - Keep care items and personal belongings within reach  - Initiate and maintain comfort rounds  - Make Fall Risk Sign visible to staff  - Offer Toileting every 2 Hours, in advance of need  - Initiate/Maintain bed alarm  - Apply yellow socks and bracelet for high fall risk patients  - Consider moving patient to room near nurses station  Outcome: Progressing  Goal: Maintain or return to baseline ADL function  Description: INTERVENTIONS:  -  Assess patient's ability to carry out ADLs; assess patient's baseline for ADL function and identify physical deficits which impact ability to perform ADLs (bathing, care of mouth/teeth, toileting, grooming, dressing, etc.)  - Assess/evaluate cause of self-care deficits   - Assess range of motion  - Assess patient's mobility; develop plan if impaired  - Assess patient's need for assistive devices and provide as appropriate  - Encourage maximum independence but intervene and supervise when necessary  - Involve family in performance of ADLs  - Assess for home care needs following discharge   - Consider OT consult to assist with ADL evaluation and planning for discharge  - Provide patient education as appropriate  - Monitor functional capacity and physical performance, use of AM PAC & JH-HLM   - Monitor gait, balance and fatigue with ambulation    Outcome: Progressing  Goal: Maintains/Returns to pre admission functional level  Description: INTERVENTIONS:  - Perform AM-PAC 6 Click Basic Mobility/ Daily Activity assessment daily.  - Set and communicate daily mobility goal to care team and patient/family/caregiver.   - Collaborate with rehabilitation services on mobility goals if consulted  - Ambulate patient as tolerated  - Out of bed to chair 3 times a day   - Out of bed for meals 3 times a day  - Out of bed for toileting  - Record  patient progress and toleration of activity level   Outcome: Progressing     Problem: DISCHARGE PLANNING  Goal: Discharge to home or other facility with appropriate resources  Description: INTERVENTIONS:  - Identify barriers to discharge w/patient and caregiver  - Arrange for needed discharge resources and transportation as appropriate  - Identify discharge learning needs (meds, wound care, etc.)  - Arrange for interpretive services to assist at discharge as needed  - Refer to Case Management Department for coordinating discharge planning if the patient needs post-hospital services based on physician/advanced practitioner order or complex needs related to functional status, cognitive ability, or social support system  Outcome: Progressing    Problem: Knowledge Deficit  Goal: Patient/family/caregiver demonstrates understanding of disease process, treatment plan, medications, and discharge instructions  Description: Complete learning assessment and assess knowledge base.  Interventions:  - Provide teaching at level of understanding  - Provide teaching via preferred learning methods  Outcome: Progressing     Problem: Nutrition/Hydration-ADULT  Goal: Nutrient/Hydration intake appropriate for improving, restoring or maintaining nutritional needs  Description: Monitor and assess patient's nutrition/hydration status for malnutrition. Collaborate with interdisciplinary team and initiate plan and interventions as ordered.  Monitor patient's weight and dietary intake as ordered or per policy. Utilize nutrition screening tool and intervene as necessary. Determine patient's food preferences and provide high-protein, high-caloric foods as appropriate.     INTERVENTIONS:  - Monitor oral intake, urinary output, labs, and treatment plans  - Assess nutrition and hydration status and recommend course of action  - Evaluate amount of meals eaten  - Assist patient with eating if necessary   - Allow adequate time for meals  - Recommend/  encourage appropriate diets, oral nutritional supplements, and vitamin/mineral supplements  - Order, calculate, and assess calorie counts as needed  - Assess need for intravenous fluids  - Provide specific nutrition/hydration education as appropriate  - Include patient/family/caregiver in decisions related to nutrition  Outcome: Progressing     Problem: Neurological Deficit  Goal: Neurological status is stable or improving  Description: Interventions:  - Monitor and assess patient's level of consciousness, motor function, sensory function, and level of assistance needed for ADLs.   - Monitor and report changes from baseline. Collaborate with interdisciplinary team to initiate plan and implement interventions as ordered.   - Provide and maintain a safe environment.  - Consider fall precautions.  - Consider aspiration precautions.  Outcome: Progressing     Problem: Activity Intolerance/Impaired Mobility  Goal: Mobility/activity is maintained at optimum level for patient  Description: Interventions:  - Assess and monitor patient  barriers to mobility and need for assistive/adaptive devices.  - Assess patient's emotional response to limitations.  - Collaborate with interdisciplinary team and initiate plans and interventions as ordered.  - Encourage independent activity per ability.  - Maintain proper body alignment.  - Perform active/passive rom as tolerated/ordered.  - Plan activities to conserve energy.  - Turn patient as appropriate  Outcome: Progressing     Problem: Potential for Aspiration  Goal: Non-ventilated patient's risk of aspiration is minimized  Description: Assess and monitor vital signs, respiratory status, and labs (WBC).  Monitor for signs of aspiration (tachypnea, cough, rales, wheezing, cyanosis, fever).    - Assess and monitor patient's ability to swallow.  - Place patient up in chair to eat if possible.  - HOB up at 90 degrees to eat if unable to get patient up into chair.  Outcome: Progressing      Problem: Nutrition  Goal: Nutrition/Hydration status is improving  Description: Monitor and assess patient's nutrition/hydration status for malnutrition (ex- brittle hair, bruises, dry skin, pale skin and conjunctiva, muscle wasting, smooth red tongue, and disorientation). Collaborate with interdisciplinary team and initiate plan and interventions as ordered.  Monitor patient's weight and dietary intake as ordered or per policy. Utilize nutrition screening tool and intervene per policy. Determine patient's food preferences and provide high-protein, high-caloric foods as appropriate.     - Assist patient with eating.  - Allow adequate time for meals.  - Encourage patient to take dietary supplement as ordered.  - Collaborate with clinical nutritionist.  - Include patient/family/caregiver in decisions related to nutrition.  Outcome: Progressing

## 2025-07-09 NOTE — ASSESSMENT & PLAN NOTE
Continue PTA levothyroxine   To get better and follow your care plan as instructed. To get better and follow your care plan as instructed.    Have sutures/staples removed POD#14.  Pt is on Xarelto for DVT prophylaxis take for 6 weeks unless otherwise directed by surgeon.  Follow up with Dr. Palm  in two weeks.  Follow up with primary care doctor in 1-2 weeks for continuity of care.

## 2025-07-09 NOTE — ED PROVIDER NOTES
Time reflects when diagnosis was documented in both MDM as applicable and the Disposition within this note       Time User Action Codes Description Comment    7/9/2025  2:10 PM Esther Farr Add [I65.23] Bilateral carotid artery stenosis     7/9/2025  2:10 PM Esther Farr Add [R29.90] Stroke-like episode     7/9/2025  2:16 PM Sam Parikh Add [R51.9] Headache     7/9/2025  2:16 PM Sam Parikh Add [I10] Hypertension           ED Disposition       ED Disposition   Admit    Condition   Stable    Date/Time   Wed Jul 9, 2025  3:21 PM    Comment   Case was discussed with JAI and the patient's admission status was agreed to be Admission Status: observation status to the service of Dr. Farr .               Assessment & Plan       Medical Decision Making  Patient is a 89 y.o. female who presents to the ED for HA.  Patient is nontoxic, well-appearing. Significantly hypertensive on arrival.     Differential includes but is not limited to: tension type HA, migraine. Low suspicion for CVA    Plan: Labs, CT, BP control, reassess                      Amount and/or Complexity of Data Reviewed  Labs: ordered.  Radiology: ordered.    Risk  Prescription drug management.  Decision regarding hospitalization.        ED Course as of 07/09/25 1521   Wed Jul 09, 2025   1417 Reevaluated.  Resting comfortably.  Discussed results and findings.  Discussed plans for admission given significantly elevated blood pressure, symptoms.  Patient and family are in agreement.  Discussed with JAI. Accepts admission       Medications   amLODIPine (NORVASC) tablet 5 mg (has no administration in time range)   apixaban (ELIQUIS) tablet 2.5 mg (has no administration in time range)   aspirin chewable tablet 81 mg (has no administration in time range)   hydroxyurea (HYDREA) capsule 500 mg (has no administration in time range)   levothyroxine tablet 75 mcg (has no administration in time range)   metoprolol succinate (TOPROL-XL) 24 hr tablet 50  mg (has no administration in time range)   pantoprazole (PROTONIX) EC tablet 40 mg (has no administration in time range)   atorvastatin (LIPITOR) tablet 40 mg (has no administration in time range)   diphenhydrAMINE (BENADRYL) injection 25 mg (has no administration in time range)   acetaminophen (Ofirmev) injection 1,000 mg (has no administration in time range)   metoclopramide (REGLAN) injection 10 mg (10 mg Intravenous Given 7/9/25 1048)   sodium chloride 0.9 % bolus 500 mL (0 mL Intravenous Stopped 7/9/25 1318)   diphenhydrAMINE (BENADRYL) injection 25 mg (25 mg Intravenous Given 7/9/25 1157)   labetalol (NORMODYNE) injection 10 mg (10 mg Intravenous Given 7/9/25 1317)       ED Risk Strat Scores                    No data recorded        SBIRT 22yo+      Flowsheet Row Most Recent Value   Initial Alcohol Screen: US AUDIT-C     1. How often do you have a drink containing alcohol? 0 Filed at: 07/09/2025 1023   2. How many drinks containing alcohol do you have on a typical day you are drinking?  0 Filed at: 07/09/2025 1023   3b. FEMALE Any Age, or MALE 65+: How often do you have 4 or more drinks on one occassion? 0 Filed at: 07/09/2025 1023   Audit-C Score 0 Filed at: 07/09/2025 1023   LOVE: How many times in the past year have you...    Used an illegal drug or used a prescription medication for non-medical reasons? Never Filed at: 07/09/2025 1023                            History of Present Illness       Chief Complaint   Patient presents with    Headache     Headache for two days and having spots in her vision bilaterally. No spots in her vision currently. No headache currently. Headache relieved by tylenol.       Past Medical History[1]   Past Surgical History[2]   Family History[3]   Social History[4]   E-Cigarette/Vaping    E-Cigarette Use Never User       E-Cigarette/Vaping Substances    Nicotine No     THC No     CBD No     Flavoring No     Other No     Unknown No       I have reviewed and agree with the  "history as documented.     89-year-old female, who presents the emergency room and for recurrent migraines, visual changes, headaches.  Started about 2 days ago.  States she developed a headache, followed by spots in her vision.  Describes them as \"sun spots\".  Has been intermittently happening since.  Currently feels fine without pain in her head nor vision changes.  Does have a history of migraines.  No numbness, tingling, focal weakness.  No chest pain, shortness of breath.  Has been taking her blood pressure medications as prescribed.  No other complaints or concerns.      Headache      Review of Systems   Neurological:  Positive for headaches.   All other systems reviewed and are negative.          Objective       ED Triage Vitals [07/09/25 1021]   Temperature Pulse Blood Pressure Respirations SpO2 Patient Position - Orthostatic VS   (!) 97.4 °F (36.3 °C) 82 (!) 220/107 18 96 % Sitting      Temp Source Heart Rate Source BP Location FiO2 (%) Pain Score    Oral Monitor Left arm -- No Pain      Vitals      Date and Time Temp Pulse SpO2 Resp BP Pain Score FACES Pain Rating User   07/09/25 1506 98.1 °F (36.7 °C) 86 -- 18 180/77 No Pain --    07/09/25 1500 -- 81 97 % 18 184/82 -- --    07/09/25 1447 -- -- -- 20 -- -- --    07/09/25 1442 -- 72 96 % 56 172/73 -- --    07/09/25 1406 -- 62 95 % 37 173/74 -- --    07/09/25 1351 -- 72 97 % 40 140/88 -- --    07/09/25 1325 -- 79 96 % 39 185/74 -- --    07/09/25 1321 -- 64 -- -- 201/86 -- --    07/09/25 1319 -- 70 -- -- 223/84 -- --    07/09/25 1306 -- 70 97 % 34 223/84 -- --    07/09/25 1230 -- 72 98 % 31 199/85 -- --    07/09/25 1215 -- 64 99 % 18 196/85 -- --    07/09/25 1130 -- 61 97 % 18 200/92 -- --    07/09/25 1100 -- 77 97 % 18 219/88 -- --    07/09/25 1045 -- 64 97 % 18 210/86 -- --    07/09/25 1024 -- -- -- -- -- No Pain --    07/09/25 1021 97.4 °F (36.3 °C) 82 96 % 18 220/107 No Pain --             Physical Exam  Vitals and " nursing note reviewed.   Constitutional:       General: She is not in acute distress.     Appearance: She is well-developed. She is not ill-appearing, toxic-appearing or diaphoretic.   HENT:      Head: Normocephalic and atraumatic.      Right Ear: External ear normal.      Left Ear: External ear normal.      Nose: Nose normal.     Eyes:      General: Lids are normal. No scleral icterus.      Cardiovascular:      Rate and Rhythm: Normal rate and regular rhythm.      Heart sounds: Normal heart sounds. No murmur heard.     No friction rub. No gallop.   Pulmonary:      Effort: Pulmonary effort is normal. No respiratory distress.      Breath sounds: Normal breath sounds. No wheezing or rales.   Abdominal:      Palpations: Abdomen is soft.      Tenderness: There is no abdominal tenderness. There is no guarding or rebound.     Musculoskeletal:         General: No deformity. Normal range of motion.      Cervical back: Normal range of motion and neck supple.     Skin:     General: Skin is warm and dry.     Neurological:      General: No focal deficit present.      Mental Status: She is alert and oriented to person, place, and time.      GCS: GCS eye subscore is 4. GCS verbal subscore is 5. GCS motor subscore is 6.      Cranial Nerves: Cranial nerves 2-12 are intact. No cranial nerve deficit or facial asymmetry.      Sensory: Sensation is intact. No sensory deficit.      Motor: Motor function is intact. No weakness.      Coordination: Coordination is intact. Finger-Nose-Finger Test normal.      Gait: Gait is intact.     Psychiatric:         Mood and Affect: Mood normal.         Behavior: Behavior normal.         Results Reviewed       Procedure Component Value Units Date/Time    Lipid Panel with Direct LDL reflex [[id number]]  (Normal) Collected: 07/09/25 1228    Lab Status: Final result Specimen: Blood from Arm, Right Updated: 07/09/25 1505     Cholesterol 133 mg/dL      Triglycerides 115 mg/dL      HDL, Direct 59 mg/dL       LDL Calculated 51 mg/dL     Hemoglobin A1c w/EAG Estimation [[id number]] Collected: 07/09/25 1048    Lab Status: In process Specimen: Blood from Arm, Right Updated: 07/09/25 1452    HS Troponin I 2hr [[id number]]  (Normal) Collected: 07/09/25 1228    Lab Status: Final result Specimen: Blood from Arm, Right Updated: 07/09/25 1258     hs TnI 2hr 7 ng/L      Delta 2hr hsTnI 1 ng/L     Urine Microscopic [[id number]]  (Normal) Collected: 07/09/25 1121    Lab Status: Final result Specimen: Urine, Clean Catch Updated: 07/09/25 1207     RBC, UA 0-1 /hpf      WBC, UA None Seen /hpf      Epithelial Cells Occasional /hpf      Bacteria, UA Occasional /hpf     HS Troponin I 4hr [[id number]]     Lab Status: No result Specimen: Blood     UA w Reflex to Microscopic w Reflex to Culture [[id number]]  (Abnormal) Collected: 07/09/25 1121    Lab Status: Final result Specimen: Urine, Clean Catch Updated: 07/09/25 1132     Color, UA Colorless     Clarity, UA Clear     Specific Gravity, UA <1.005     pH, UA 6.5     Leukocytes, UA Negative     Nitrite, UA Negative     Protein, UA Negative mg/dl      Glucose, UA Negative mg/dl      Ketones, UA Negative mg/dl      Urobilinogen, UA <2.0 mg/dl      Bilirubin, UA Negative     Occult Blood, UA Trace    HS Troponin 0hr (reflex protocol) [[id number]]  (Normal) Collected: 07/09/25 1048    Lab Status: Final result Specimen: Blood from Arm, Right Updated: 07/09/25 1124     hs TnI 0hr 6 ng/L     Basic metabolic panel [[id number]] Collected: 07/09/25 1048    Lab Status: Final result Specimen: Blood from Arm, Right Updated: 07/09/25 1119     Sodium 136 mmol/L      Potassium 3.9 mmol/L      Chloride 101 mmol/L      CO2 28 mmol/L      ANION GAP 7 mmol/L      BUN 16 mg/dL      Creatinine 0.82 mg/dL      Glucose 92 mg/dL      Calcium 9.2 mg/dL      eGFR 62 ml/min/1.73sq m     Narrative:      [employer] guidelines for Chronic Kidney Disease (CKD):     Stage 1 with normal or high GFR (GFR > 90  mL/min/1.73 square meters)    Stage 2 Mild CKD (GFR = 60-89 mL/min/1.73 square meters)    Stage 3A Moderate CKD (GFR = 45-59 mL/min/1.73 square meters)    Stage 3B Moderate CKD (GFR = 30-44 mL/min/1.73 square meters)    Stage 4 Severe CKD (GFR = 15-29 mL/min/1.73 square meters)    Stage 5 End Stage CKD (GFR <15 mL/min/1.73 square meters)  Note: GFR calculation is accurate only with a steady state creatinine    CBC and differential [[id number]]  (Abnormal) Collected: 07/09/25 1048    Lab Status: Final result Specimen: Blood from Arm, Right Updated: 07/09/25 1057     WBC 4.72 Thousand/uL      RBC 3.05 Million/uL      Hemoglobin 11.5 g/dL      Hematocrit 36.4 %       fL      MCH 37.7 pg      MCHC 31.6 g/dL      RDW 15.5 %      MPV 10.4 fL      Platelets 252 Thousands/uL      nRBC 0 /100 WBCs      Segmented % 68 %      Immature Grans % 0 %      Lymphocytes % 23 %      Monocytes % 8 %      Eosinophils Relative 1 %      Basophils Relative 0 %      Absolute Neutrophils 3.17 Thousands/µL      Absolute Immature Grans 0.01 Thousand/uL      Absolute Lymphocytes 1.10 Thousands/µL      Absolute Monocytes 0.37 Thousand/µL      Eosinophils Absolute 0.05 Thousand/µL      Basophils Absolute 0.02 Thousands/µL             CT head without contrast   Final Interpretation by Olman Agudelo MD (07/09 1136)      Left maxillary sinus fluid in keeping with hemorrhage.                  Workstation performed: [id number]         MRI Inpatient Order    (Results Pending)       ECG 12 Lead Documentation Only    Date/Time: 7/9/2025 3:16 PM    Performed by: Sam Parikh DO  Authorized by: Sam Parikh DO    ECG reviewed by me, the ED Provider: yes    Patient location:  ED  Interpretation:     Interpretation: abnormal    Rate:     ECG rate:  64    ECG rate assessment: normal    Rhythm:     Rhythm: paced    Pacing:     Capture:  Complete    Type of pacing:  Atrial  Ectopy:     Ectopy: none    QRS:     QRS axis:   Normal  Conduction:     Conduction: normal    ST segments:     ST segments:  Normal  T waves:     T waves: normal        ED Medication and Procedure Management   Prior to Admission Medications   Prescriptions Last Dose Informant Patient Reported? Taking?   Cholecalciferol 25 MCG (1000 UT) CHEW 7/9/2025 at  9:00 AM  Yes Yes   Sig: Chew   acetaminophen (TYLENOL) 325 mg tablet 7/9/2025 at  9:00 AM  No Yes   Sig: Take 2 tablets (650 mg total) by mouth every 6 (six) hours as needed for mild pain, fever or headaches   amLODIPine (NORVASC) 5 mg tablet 7/8/2025 at  7:00 PM  Yes Yes   Sig: Take 5 mg by mouth in the evening.   apixaban (Eliquis) 2.5 mg   No No   Sig: Take 1 tablet by mouth twice daily   aspirin (ECOTRIN LOW STRENGTH) 81 mg EC tablet Past Week Self No Yes   Sig: Take 1 tablet (81 mg total) by mouth daily   cyanocobalamin (VITAMIN B-12) 500 MCG tablet 7/8/2025 at  9:00 AM  No Yes   Sig: Take 0.5 tablets (250 mcg total) by mouth daily   dicyclomine (BENTYL) 10 mg capsule Unknown  No No   Sig: Take 1 capsule (10 mg total) by mouth 3 (three) times a day as needed (abdominal spasm)   hydroxyurea (HYDREA) 500 mg capsule 7/8/2025 at  7:00 PM  Yes Yes   Sig: in the morning.   levothyroxine 75 mcg tablet 7/9/2025 at  4:00 AM  No Yes   Sig: Take 1 tablet (75 mcg total) by mouth daily   metoprolol succinate (TOPROL-XL) 50 mg 24 hr tablet 7/9/2025 at  9:00 AM  No Yes   Sig: Take 1 tablet (50 mg total) by mouth 2 (two) times a day   omeprazole (PriLOSEC) 40 MG capsule 7/9/2025 at  9:00 AM  No Yes   Sig: Take 1 capsule (40 mg total) by mouth daily   rosuvastatin (CRESTOR) 40 MG tablet 7/8/2025 at  7:00 PM  No Yes   Sig: Take 1 tablet (40 mg total) by mouth every evening      Facility-Administered Medications: None     Current Discharge Medication List        START taking these medications    Details   apixaban (Eliquis) 2.5 mg Take 1 tablet by mouth twice daily  Qty: 60 tablet, Refills: 2    Associated Diagnoses: Atrial  fibrillation with rapid ventricular response (HCC)           CONTINUE these medications which have NOT CHANGED    Details   acetaminophen (TYLENOL) 325 mg tablet Take 2 tablets (650 mg total) by mouth every 6 (six) hours as needed for mild pain, fever or headaches    Associated Diagnoses: SSS (sick sinus syndrome) (HCC)      amLODIPine (NORVASC) 5 mg tablet Take 5 mg by mouth in the evening.      aspirin (ECOTRIN LOW STRENGTH) 81 mg EC tablet Take 1 tablet (81 mg total) by mouth daily  Qty: 30 tablet, Refills: 0    Associated Diagnoses: TIA (transient ischemic attack)      Cholecalciferol 25 MCG (1000 UT) CHEW Chew      cyanocobalamin (VITAMIN B-12) 500 MCG tablet Take 0.5 tablets (250 mcg total) by mouth daily    Associated Diagnoses: Anemia, unspecified type      hydroxyurea (HYDREA) 500 mg capsule in the morning.      levothyroxine 75 mcg tablet Take 1 tablet (75 mcg total) by mouth daily  Qty: 90 tablet, Refills: 0    Associated Diagnoses: Hypothyroid      metoprolol succinate (TOPROL-XL) 50 mg 24 hr tablet Take 1 tablet (50 mg total) by mouth 2 (two) times a day  Qty: 180 tablet, Refills: 1    Associated Diagnoses: Essential hypertension      omeprazole (PriLOSEC) 40 MG capsule Take 1 capsule (40 mg total) by mouth daily  Qty: 90 capsule, Refills: 1    Associated Diagnoses: Gastroesophageal reflux disease without esophagitis      rosuvastatin (CRESTOR) 40 MG tablet Take 1 tablet (40 mg total) by mouth every evening  Qty: 90 tablet, Refills: 1    Associated Diagnoses: Dyslipidemia      dicyclomine (BENTYL) 10 mg capsule Take 1 capsule (10 mg total) by mouth 3 (three) times a day as needed (abdominal spasm)  Qty: 20 capsule, Refills: 0    Associated Diagnoses: Gastroesophageal reflux disease without esophagitis           No discharge procedures on file.  ED SEPSIS DOCUMENTATION   Time reflects when diagnosis was documented in both MDM as applicable and the Disposition within this note       Time User Action Codes  Description Comment    7/9/2025  2:10 PM Esther Farr Add [I65.23] Bilateral carotid artery stenosis     7/9/2025  2:10 PM Esther Farr Add [R29.90] Stroke-like episode     7/9/2025  2:16 PM Sam Parikh [R51.9] Headache     7/9/2025  2:16 PM Sam Parikh [I10] Hypertension                    [1]   Past Medical History:  Diagnosis Date    Anxiety     GERD (gastroesophageal reflux disease)     Hypertension     Hypothyroid     Vertigo    [2]   Past Surgical History:  Procedure Laterality Date    BREAST BIOPSY Left 1974    CARDIAC ELECTROPHYSIOLOGY PROCEDURE Left 12/12/2024    Procedure: Cardiac pacer implant;  Surgeon: Ruma Emanuel MD;  Location: [facility];  Service: Cardiology    COLONOSCOPY  04/2018    HYSTERECTOMY     [3]   Family History  Problem Relation Name Age of Onset    Thrombosis Mother      Cancer Father      Diabetes Neg Hx     [4]   Social History  Tobacco Use    Smoking status: Former     Passive exposure: Never    Smokeless tobacco: Never   Vaping Use    Vaping status: Never Used   Substance Use Topics    Alcohol use: Not Currently    Drug use: No        Sam Parikh DO  07/09/25 1522

## 2025-07-09 NOTE — ASSESSMENT & PLAN NOTE
Noted on presentation.  Currently patient is asymptomatic.  Blood pressure trending down with labetalol x 1.      - Restart home medications  -Continue to monitor vitals every 4 hours  -Interval follow-up with a.m. labs

## 2025-07-09 NOTE — ASSESSMENT & PLAN NOTE
On rosuvastatin transitioning to atorvastatin while hospitalized   Instructions: This plan will send the code FBSE to the PM system.  DO NOT or CHANGE the price. Price (Do Not Change): 0.00 Detail Level: Simple

## 2025-07-09 NOTE — H&P
H&P - Hospitalist   Name: Blank Mensah 89 y.o. female I MRN: [id number]  Unit/Bed#: ED 08 I Date of Admission: 7/9/2025   Date of Service: 7/9/2025 I Hospital Day: 0     Assessment & Plan  Acquired hypothyroidism  Continue PTA levothyroxine  Accelerated hypertension  Noted on presentation.  Currently patient is asymptomatic.  Blood pressure trending down with labetalol x 1.      - Restart home medications  -Continue to monitor vitals every 4 hours  -Interval follow-up with a.m. labs  Mixed hyperlipidemia  On rosuvastatin transitioning to atorvastatin while hospitalized  Gastroesophageal reflux disease without esophagitis  Continue omeprazole  Essential thrombocytosis (HCC)  On hydroxyurea.  Will continue  S/P placement of cardiac pacemaker  History noted.  Stroke-like symptoms  Reports onset of frontal headache that is throbbing associated with vision changes described as seeing floaters.  Headache started on Monday lasted several hours and self-resolved.  Recurred on Tuesday for the same duration and again recurred this morning.  Headache is moderate in intensity.  Does not radiate.  No associated auditory changes, nausea or vomiting, lightheadedness or dizziness.  No palpitations or chest pain or shortness of breath.     Patient reports remote history of migraines however have not had any in several years.  She was recently placed off Eliquis/aspirin for her biopsy on 7/10/2025.    ASA/Eliquis on hold for biopsy tomorrow.     CT head; left maxillary sinus fluid otherwise no acute findings.    Plan:   Check CTA head and neck  Restart aspirin and Eliquis  Check MRI brain  Check updated echo with bubble study  Neurochecks  Keep on stroke pathway  Start telemetry  Neurology on consult; appreciate input  Atrial fibrillation (HCC)  On eliquis and metoprolol.   Pacemaker for SSS     Follows up with  cardiology.   Monitor on telemetry  Migraine  History of migraines that presents in a similar way . However havent had  migraine for several years now.     Order PRN medication for now since migraine resolved  Neurology consult       VTE Pharmacologic Prophylaxis:   Moderate Risk (Score 3-4) - Pharmacological DVT Prophylaxis Ordered: apixaban (Eliquis).  Code Status: Level 1 - Full Code   Discussion with family: Updated  (daughter) at bedside.    Anticipated Length of Stay: Patient will be admitted on an inpatient basis with an anticipated length of stay of greater than 2 midnights secondary to stroke like symptoms, migraine .    History of Present Illness   Chief Complaint: headache with change in vision     Blank Mensah is a 89 y.o. female with a PMH of hypertension, hyperlipidemia, hypothyroidism, atrial fibrillation, cerebral artery stenosis who presents with episodes of frontal headaches and change in vision that started on Monday.  The patient reports symptoms lasting 2 to 3 hours at a time and occurring once a day since then.  Reports a remote history of migraines that have not occurred for several years now.  Describes the headache as frontal bilateral throbbing mild to moderate.  Resolved without medications.  Associated with seeing floaters in her vision.  No blurred vision.  No auditory changes.  No dizziness or lightheadedness or chest pain or palpitations or shortness of breath.  No nausea or vomiting.  No specific timing for the headaches.  No recent falls or trauma.    Reports being off her aspirin and Eliquis for the past 3 days in preparation for bone biopsy tomorrow.     Review of Systems   Constitutional:  Negative for chills, fatigue and fever.   HENT: Negative.  Negative for hearing loss.    Eyes:  Positive for visual disturbance.   Respiratory:  Negative for shortness of breath and wheezing.    Cardiovascular:  Negative for chest pain and palpitations.   Gastrointestinal:  Negative for abdominal pain, blood in stool, constipation, diarrhea, nausea and vomiting.   Endocrine: Negative.     Genitourinary:  Negative for difficulty urinating and dysuria.   Musculoskeletal:  Negative for arthralgias and myalgias.   Skin: Negative.    Allergic/Immunologic: Negative.    Neurological:  Positive for headaches. Negative for seizures and syncope.   Hematological:  Negative for adenopathy.   Psychiatric/Behavioral: Negative.         Historical Information   Past Medical History[1]  Past Surgical History[2]  Social History[3]  E-Cigarette/Vaping    E-Cigarette Use Never User      E-Cigarette/Vaping Substances    Nicotine No     THC No     CBD No     Flavoring No     Other No     Unknown No      Family history non-contributory  Social History:  Marital Status: /Civil Union   Occupation: none   Patient Pre-hospital Living Situation: Home  Patient Pre-hospital Level of Mobility: walks  Patient Pre-hospital Diet Restrictions: none     Meds/Allergies   I have reviewed home medications with patient personally.  Prior to Admission medications    Medication Sig Start Date End Date Taking? Authorizing Provider   acetaminophen (TYLENOL) 325 mg tablet Take 2 tablets (650 mg total) by mouth every 6 (six) hours as needed for mild pain, fever or headaches 12/13/24  Yes Shyann Peterson MD   amLODIPine (NORVASC) 5 mg tablet Take 5 mg by mouth in the evening.   Yes Historical Provider, MD   aspirin (ECOTRIN LOW STRENGTH) 81 mg EC tablet Take 1 tablet (81 mg total) by mouth daily 10/6/20 7/9/25 Yes ÁNGEL Barahona   Cholecalciferol 25 MCG (1000 UT) CHEW Chew   Yes Historical Provider, MD   cyanocobalamin (VITAMIN B-12) 500 MCG tablet Take 0.5 tablets (250 mcg total) by mouth daily 12/14/24  Yes Shyann Peterson MD   hydroxyurea (HYDREA) 500 mg capsule in the morning. 8/20/24  Yes Historical Provider, MD   levothyroxine 75 mcg tablet Take 1 tablet (75 mcg total) by mouth daily 4/25/25  Yes Andreia Barba DO   metoprolol succinate (TOPROL-XL) 50 mg 24 hr tablet Take 1 tablet (50 mg total) by mouth 2 (two) times a day  2/11/25  Yes Ruma Emanuel MD   omeprazole (PriLOSEC) 40 MG capsule Take 1 capsule (40 mg total) by mouth daily 4/16/25  Yes Andreia Barba DO   rosuvastatin (CRESTOR) 40 MG tablet Take 1 tablet (40 mg total) by mouth every evening 8/27/24  Yes Andreia Barba DO   apixaban (Eliquis) 2.5 mg Take 1 tablet by mouth twice daily 3/31/25 7/9/25 Yes Ruma Emanuel MD   apixaban (Eliquis) 2.5 mg Take 1 tablet by mouth twice daily 7/9/25   Ruma Emanuel MD   dicyclomine (BENTYL) 10 mg capsule Take 1 capsule (10 mg total) by mouth 3 (three) times a day as needed (abdominal spasm) 6/13/25   Andreia Barba DO     Allergies   Allergen Reactions    Iodinated Contrast Media     Dye Fdc Red [Red Dye - Food Allergy] Palpitations       Objective :  Temp:  [97.4 °F (36.3 °C)] 97.4 °F (36.3 °C)  HR:  [61-82] 72  BP: (140-223)/() 172/73  Resp:  [18-56] 20  SpO2:  [95 %-99 %] 96 %  O2 Device: None (Room air)    Physical Exam  Vitals and nursing note reviewed.   Constitutional:       General: She is not in acute distress.     Appearance: Normal appearance.   HENT:      Head: Normocephalic and atraumatic.      Mouth/Throat:      Mouth: Mucous membranes are moist.     Eyes:      Conjunctiva/sclera: Conjunctivae normal.      Pupils: Pupils are equal, round, and reactive to light.       Cardiovascular:      Rate and Rhythm: Normal rate.      Pulses: Normal pulses.           Carotid pulses are 2+ on the right side and 2+ on the left side.       Radial pulses are 2+ on the right side and 2+ on the left side.      Heart sounds: Normal heart sounds, S1 normal and S2 normal. No murmur heard.  Pulmonary:      Effort: No tachypnea, bradypnea or accessory muscle usage.      Breath sounds: Normal breath sounds and air entry. No decreased breath sounds, wheezing, rhonchi or rales.   Abdominal:      General: Abdomen is flat. There is no distension.      Palpations: Abdomen is soft.      Tenderness: There is no abdominal tenderness.      Musculoskeletal:         General: Normal range of motion.      Right lower leg: No edema.      Left lower leg: No edema.     Skin:     General: Skin is warm.      Capillary Refill: Capillary refill takes less than 2 seconds.     Neurological:      General: No focal deficit present.      Mental Status: She is alert. Mental status is at baseline.      GCS: GCS eye subscore is 4. GCS verbal subscore is 5. GCS motor subscore is 6.      Cranial Nerves: Cranial nerves 2-12 are intact.      Motor: Motor function is intact.     Psychiatric:         Mood and Affect: Mood normal.         Behavior: Behavior normal.          Lines/Drains:            Lab Results: I have reviewed the following results:  Results from last 7 days   Lab Units 07/09/25  1048   WBC Thousand/uL 4.72   HEMOGLOBIN g/dL 11.5   HEMATOCRIT % 36.4   PLATELETS Thousands/uL 252   SEGS PCT % 68   LYMPHO PCT % 23   MONO PCT % 8   EOS PCT % 1     Results from last 7 days   Lab Units 07/09/25  1048   SODIUM mmol/L 136   POTASSIUM mmol/L 3.9   CHLORIDE mmol/L 101   CO2 mmol/L 28   BUN mg/dL 16   CREATININE mg/dL 0.82   ANION GAP mmol/L 7   CALCIUM mg/dL 9.2   GLUCOSE RANDOM mg/dL 92             Lab Results   Component Value Date    HGBA1C 5.5 10/05/2020           Imaging Results Review: No pertinent imaging studies reviewed.  Other Study Results Review: No additional pertinent studies reviewed.    Administrative Statements   I have spent a total time of 45 minutes in caring for this patient on the day of the visit/encounter including Diagnostic results, Prognosis, Risks and benefits of tx options, and Instructions for management.    ** Please Note: This note has been constructed using a voice recognition system. **         [1]   Past Medical History:  Diagnosis Date    Anxiety     GERD (gastroesophageal reflux disease)     Hypertension     Hypothyroid     Vertigo    [2]   Past Surgical History:  Procedure Laterality Date    BREAST BIOPSY Left 1974    CARDIAC  ELECTROPHYSIOLOGY PROCEDURE Left 12/12/2024    Procedure: Cardiac pacer implant;  Surgeon: Ruma Emanuel MD;  Location: [facility];  Service: Cardiology    COLONOSCOPY  04/2018    HYSTERECTOMY     [3]   Social History  Tobacco Use    Smoking status: Former     Passive exposure: Never    Smokeless tobacco: Never   Vaping Use    Vaping status: Never Used   Substance and Sexual Activity    Alcohol use: Not Currently    Drug use: No

## 2025-07-09 NOTE — TELEPHONE ENCOUNTER
Pt's daughter Padmini called to cancel Bone BX for tomorrow. She stated that pt is currently in the ER and was told to hold off on the bx. She will c/b to r/s once pt is discharged.

## 2025-07-09 NOTE — ASSESSMENT & PLAN NOTE
History of migraines that presents in a similar way . However havent had migraine for several years now.     Order PRN medication for now since migraine resolved  Neurology consult

## 2025-07-10 ENCOUNTER — HOSPITAL ENCOUNTER (OUTPATIENT)
Dept: RADIOLOGY | Facility: HOSPITAL | Age: OVER 89
Discharge: HOME | End: 2025-07-10
Attending: RADIOLOGY

## 2025-07-10 ENCOUNTER — APPOINTMENT (OUTPATIENT)
Dept: NON INVASIVE DIAGNOSTICS | Facility: HOSPITAL | Age: OVER 89
End: 2025-07-10
Payer: MEDICARE

## 2025-07-10 ENCOUNTER — APPOINTMENT (OUTPATIENT)
Dept: RADIOLOGY | Facility: HOSPITAL | Age: OVER 89
End: 2025-07-10
Payer: MEDICARE

## 2025-07-10 VITALS
TEMPERATURE: 98 F | HEIGHT: 61 IN | DIASTOLIC BLOOD PRESSURE: 59 MMHG | BODY MASS INDEX: 20.77 KG/M2 | HEART RATE: 71 BPM | OXYGEN SATURATION: 97 % | SYSTOLIC BLOOD PRESSURE: 132 MMHG | RESPIRATION RATE: 19 BRPM | WEIGHT: 110 LBS

## 2025-07-10 PROBLEM — M89.8X5 LYTIC BONE LESION OF HIP: Status: ACTIVE | Noted: 2025-07-10

## 2025-07-10 PROBLEM — K57.90 DIVERTICULOSIS: Status: ACTIVE | Noted: 2025-07-10

## 2025-07-10 PROBLEM — J01.90 ACUTE SINUSITIS: Status: ACTIVE | Noted: 2025-07-10

## 2025-07-10 LAB
ALBUMIN SERPL BCG-MCNC: 3.5 G/DL (ref 3.5–5)
ALP SERPL-CCNC: 53 U/L (ref 34–104)
ALT SERPL W P-5'-P-CCNC: 7 U/L (ref 7–52)
ANION GAP SERPL CALCULATED.3IONS-SCNC: 6 MMOL/L (ref 4–13)
AORTIC ROOT: 3.4 CM
AST SERPL W P-5'-P-CCNC: 14 U/L (ref 13–39)
ATRIAL RATE: 357 BPM
AV LVOT PEAK GRADIENT: 5 MMHG
AV PEAK GRADIENT: 10 MMHG
BASOPHILS # BLD AUTO: 0.03 THOUSANDS/ÂΜL (ref 0–0.1)
BASOPHILS NFR BLD AUTO: 1 % (ref 0–1)
BILIRUB SERPL-MCNC: 0.45 MG/DL (ref 0.2–1)
BSA FOR ECHO PROCEDURE: 1.47 M2
BUN SERPL-MCNC: 13 MG/DL (ref 5–25)
CALCIUM SERPL-MCNC: 9.3 MG/DL (ref 8.4–10.2)
CHLORIDE SERPL-SCNC: 105 MMOL/L (ref 96–108)
CO2 SERPL-SCNC: 28 MMOL/L (ref 21–32)
CREAT SERPL-MCNC: 0.73 MG/DL (ref 0.6–1.3)
DOP CALC LVOT AREA: 2.83 CM2
DOP CALC LVOT DIAMETER: 1.9 CM
E WAVE DECELERATION TIME: 188 MS
E/A RATIO: 2.37
EOSINOPHIL # BLD AUTO: 0.04 THOUSAND/ÂΜL (ref 0–0.61)
EOSINOPHIL NFR BLD AUTO: 1 % (ref 0–6)
ERYTHROCYTE [DISTWIDTH] IN BLOOD BY AUTOMATED COUNT: 15.9 % (ref 11.6–15.1)
FRACTIONAL SHORTENING: 30 (ref 28–44)
GFR SERPL CREATININE-BSD FRML MDRD: 72 ML/MIN/1.73SQ M
GLUCOSE P FAST SERPL-MCNC: 91 MG/DL (ref 65–99)
GLUCOSE SERPL-MCNC: 91 MG/DL (ref 65–140)
HCT VFR BLD AUTO: 34.5 % (ref 34.8–46.1)
HGB BLD-MCNC: 11.2 G/DL (ref 11.5–15.4)
IMM GRANULOCYTES # BLD AUTO: 0.01 THOUSAND/UL (ref 0–0.2)
IMM GRANULOCYTES NFR BLD AUTO: 0 % (ref 0–2)
INTERVENTRICULAR SEPTUM IN DIASTOLE (PARASTERNAL SHORT AXIS VIEW): 1.1 CM
INTERVENTRICULAR SEPTUM: 1.1 CM (ref 0.6–1.1)
LAAS-AP2: 21.1 CM2
LAAS-AP4: 16.3 CM2
LEFT ATRIUM SIZE: 2.8 CM
LEFT ATRIUM VOLUME (MOD BIPLANE): 49 ML
LEFT ATRIUM VOLUME INDEX (MOD BIPLANE): 33.3 ML/M2
LEFT INTERNAL DIMENSION IN SYSTOLE: 2.8 CM (ref 2.1–4)
LEFT VENTRICULAR INTERNAL DIMENSION IN DIASTOLE: 4 CM (ref 3.5–6)
LEFT VENTRICULAR POSTERIOR WALL IN END DIASTOLE: 0.9 CM
LEFT VENTRICULAR STROKE VOLUME: 42 ML
LV EF US.2D.A4C+ESTIMATED: 59 %
LVSV (TEICH): 42 ML
LYMPHOCYTES # BLD AUTO: 1.1 THOUSANDS/ÂΜL (ref 0.6–4.47)
LYMPHOCYTES NFR BLD AUTO: 19 % (ref 14–44)
MCH RBC QN AUTO: 38.5 PG (ref 26.8–34.3)
MCHC RBC AUTO-ENTMCNC: 32.5 G/DL (ref 31.4–37.4)
MCV RBC AUTO: 119 FL (ref 82–98)
MONOCYTES # BLD AUTO: 0.53 THOUSAND/ÂΜL (ref 0.17–1.22)
MONOCYTES NFR BLD AUTO: 9 % (ref 4–12)
MV E'TISSUE VEL-SEP: 8 CM/S
MV PEAK A VEL: 0.49 M/S
MV PEAK E VEL: 116 CM/S
MV STENOSIS PRESSURE HALF TIME: 55 MS
MV VALVE AREA P 1/2 METHOD: 4
NEUTROPHILS # BLD AUTO: 3.96 THOUSANDS/ÂΜL (ref 1.85–7.62)
NEUTS SEG NFR BLD AUTO: 70 % (ref 43–75)
NRBC BLD AUTO-RTO: 0 /100 WBCS
PLATELET # BLD AUTO: 217 THOUSANDS/UL (ref 149–390)
PMV BLD AUTO: 10.2 FL (ref 8.9–12.7)
POTASSIUM SERPL-SCNC: 3.8 MMOL/L (ref 3.5–5.3)
PROT SERPL-MCNC: 6.5 G/DL (ref 6.4–8.4)
QRS AXIS: -23 DEGREES
QRSD INTERVAL: 74 MS
QT INTERVAL: 400 MS
QTC INTERVAL: 412 MS
RBC # BLD AUTO: 2.91 MILLION/UL (ref 3.81–5.12)
RIGHT ATRIUM AREA SYSTOLE A4C: 18.2 CM2
RIGHT VENTRICLE ID DIMENSION: 2.9 CM
SL CV LEFT ATRIUM LENGTH A2C: 5.5 CM
SL CV LV EF: 59
SL CV PED ECHO LEFT VENTRICLE DIASTOLIC VOLUME (MOD BIPLANE) 2D: 71 ML
SL CV PED ECHO LEFT VENTRICLE SYSTOLIC VOLUME (MOD BIPLANE) 2D: 29 ML
SODIUM SERPL-SCNC: 139 MMOL/L (ref 135–147)
T WAVE AXIS: 18 DEGREES
TR MAX PG: 37 MMHG
TR PEAK VELOCITY: 3.1 M/S
TRICUSPID ANNULAR PLANE SYSTOLIC EXCURSION: 1.8 CM
TRICUSPID VALVE PEAK REGURGITATION VELOCITY: 3.05 M/S
VENTRICULAR RATE: 64 BPM
WBC # BLD AUTO: 5.67 THOUSAND/UL (ref 4.31–10.16)

## 2025-07-10 PROCEDURE — 99215 OFFICE O/P EST HI 40 MIN: CPT | Performed by: PSYCHIATRY & NEUROLOGY

## 2025-07-10 PROCEDURE — 85025 COMPLETE CBC W/AUTO DIFF WBC: CPT

## 2025-07-10 PROCEDURE — 97530 THERAPEUTIC ACTIVITIES: CPT

## 2025-07-10 PROCEDURE — 93880 EXTRACRANIAL BILAT STUDY: CPT

## 2025-07-10 PROCEDURE — 93306 TTE W/DOPPLER COMPLETE: CPT

## 2025-07-10 PROCEDURE — 93306 TTE W/DOPPLER COMPLETE: CPT | Performed by: INTERNAL MEDICINE

## 2025-07-10 PROCEDURE — 97163 PT EVAL HIGH COMPLEX 45 MIN: CPT

## 2025-07-10 PROCEDURE — 97535 SELF CARE MNGMENT TRAINING: CPT

## 2025-07-10 PROCEDURE — 93010 ELECTROCARDIOGRAM REPORT: CPT | Performed by: INTERNAL MEDICINE

## 2025-07-10 PROCEDURE — 70551 MRI BRAIN STEM W/O DYE: CPT

## 2025-07-10 PROCEDURE — 99239 HOSP IP/OBS DSCHRG MGMT >30: CPT

## 2025-07-10 PROCEDURE — 80053 COMPREHEN METABOLIC PANEL: CPT

## 2025-07-10 PROCEDURE — 93880 EXTRACRANIAL BILAT STUDY: CPT | Performed by: SURGERY

## 2025-07-10 PROCEDURE — 97167 OT EVAL HIGH COMPLEX 60 MIN: CPT

## 2025-07-10 PROCEDURE — A9585 GADOBUTROL INJECTION: HCPCS | Performed by: PSYCHIATRY & NEUROLOGY

## 2025-07-10 RX ORDER — GADOBUTROL 604.72 MG/ML
5 INJECTION INTRAVENOUS
Status: COMPLETED | OUTPATIENT
Start: 2025-07-10 | End: 2025-07-10

## 2025-07-10 RX ORDER — AMLODIPINE BESYLATE 10 MG/1
10 TABLET ORAL DAILY
Qty: 30 TABLET | Refills: 0 | Status: SHIPPED | OUTPATIENT
Start: 2025-07-11

## 2025-07-10 RX ORDER — AMLODIPINE BESYLATE 10 MG/1
10 TABLET ORAL DAILY
Status: DISCONTINUED | OUTPATIENT
Start: 2025-07-10 | End: 2025-07-10 | Stop reason: HOSPADM

## 2025-07-10 RX ORDER — AMLODIPINE BESYLATE 5 MG/1
5 TABLET ORAL DAILY
Status: DISCONTINUED | OUTPATIENT
Start: 2025-07-10 | End: 2025-07-10

## 2025-07-10 RX ADMIN — APIXABAN 2.5 MG: 2.5 TABLET, FILM COATED ORAL at 09:25

## 2025-07-10 RX ADMIN — AMLODIPINE BESYLATE 10 MG: 10 TABLET ORAL at 09:25

## 2025-07-10 RX ADMIN — PANTOPRAZOLE SODIUM 40 MG: 40 TABLET, DELAYED RELEASE ORAL at 06:39

## 2025-07-10 RX ADMIN — GADOBUTROL 5 ML: 604.72 INJECTION INTRAVENOUS at 10:39

## 2025-07-10 RX ADMIN — ASPIRIN 81 MG: 81 TABLET, CHEWABLE ORAL at 09:25

## 2025-07-10 RX ADMIN — AMOXICILLIN AND CLAVULANATE POTASSIUM 1 TABLET: 875; 125 TABLET, FILM COATED ORAL at 12:48

## 2025-07-10 RX ADMIN — LEVOTHYROXINE SODIUM 75 MCG: 0.07 TABLET ORAL at 05:29

## 2025-07-10 RX ADMIN — HYDROXYUREA 500 MG: 500 CAPSULE ORAL at 09:25

## 2025-07-10 RX ADMIN — ATORVASTATIN CALCIUM 40 MG: 40 TABLET, FILM COATED ORAL at 16:10

## 2025-07-10 RX ADMIN — METOPROLOL SUCCINATE 50 MG: 50 TABLET, EXTENDED RELEASE ORAL at 09:25

## 2025-07-10 NOTE — CONSULTS
Consultation - Neurology   Name: Blank Mensah 89 y.o. female I MRN: [id number]  Unit/Bed#: 4 [place] 410-01 I Date of Admission: 7/9/2025   Date of Service: 7/10/2025 I Hospital Day: 0   Inpatient consult to Neurology  Consult performed by: Tammi Nur PA-C  Consult ordered by: Esther Farr MD        Physician Requesting Evaluation: Esther Farr MD   Reason for Evaluation / Principal Problem: B/L carotid stenosis/Stroke-like episode    Assessment & Plan  Stroke-like symptoms  89 yo female with HTN, HLD, GERD, essential thrombocytosis, hypothyroidism, atrial fibrillation maintained on Eliquis, s/p PPM admitted on 7/9/2025 with complaint of frontal HA and change in vision beginning on Monday 7/7/2025.     Neuroimaging   7/9/2025 CTH:   Left maxillary sinus fluid.    Symptoms typical of patient's prior migraine but has not had a migraine in many years and would be unusual for migraines to recur in this age range. Given concern for possible underlying malignancy, cannot entirely exclude ischemic stroke vs metastasis. Patient reports symptoms have resolved at this time and she feels improved, but does report feeling generally weak.     Plan:   - Check MRI brain with and without contrast given concern for possible malignancy. Spoke with MRI team and able to add contrast to study.  - Echocardiogram completed and results are pending.   - Monitor on telemetry.   - Check carotid dopplers.  - Lipid panel reviewed, total cholesterol 133, triglycerides 115, HDL 59, LDL 51.  - Hemoglobin A1c reviewed, 5.5.  - Continue on home dose of Eliquis 2.5 mg BID (appropriately on lower dose in setting of age and weight) and ASA 81 mg QD.  - Continue atorvastatin 40 mg QPM.   - Goal normotension, normothermia and euglycemia.   - PT/OT  - Stroke education.   - Monitor exam and notify with changes.  Accelerated hypertension  - Goal normotension.   - Management as per primary team.  Mixed hyperlipidemia  - Lipid panel reviewed as  above.   - Atorvastatin 40 mg QPM.  Acquired hypothyroidism  - Management as per primary team.  Essential thrombocytosis (HCC)  - Follows with oncology team as an outpatient.  S/P placement of cardiac pacemaker  - PPM in place.  Atrial fibrillation (HCC)  - Rate control as per primary team.  - Eliquis 2.5 mg BID.  Lytic bone lesion of hip  - Was scheduled for bone biopsy today but cancelled due to hospitalization.    I have discussed the above management plan in detail with the primary service.     Neurology hospital follow-up recommendations to be determined.    History of Present Illness   Blank Mensah is a 89 y.o.  female  with HTN, HLD, GERD, essential thrombocytosis, hypothyroidism, atrial fibrillation maintained on Eliquis, s/p PPM admitted on 7/9/2025 with complaint of frontal HA and change in vision beginning on Monday 7/7/2025.     Of note, patient had been off of ASA and Eliquis for 3 days prior to admission in preparation for bone biopsy. Per review of chart, last dose of ASA was 7/4/2025 and last dose of Eliquis was 7/7/2025.    Patient reports that around 1600 on Monday 7/7/2025, she developed HA with associated floaters in her vision. She reports associated nausea as well as photophobia. She also reports that she felt generally fatigued and weak. She notes that she previously had migraines and symptoms were nearly identical, but she reports that with her prior migraines, she did not feel fatigued or weak. She reports it has been many years since she had a migraine. She notes she took Tylenol and symptoms improved, but recurred almost every day this week and that is what prompted her family to bring her to the hospital.     She notes this morning that she continues to feel generally weak, but denies any HA or vision changes. She notes she has had occasional sharp pain in her L foot as well but this lasts only a few seconds and then resolves spontaneously.    Review of Systems   Constitutional:   Positive for fatigue. Negative for chills and fever.   HENT:  Negative for trouble swallowing.    Eyes:  Negative for photophobia and visual disturbance.   Respiratory:  Negative for shortness of breath.    Cardiovascular:  Negative for chest pain.   Gastrointestinal:  Negative for abdominal pain, nausea and vomiting.   Musculoskeletal:  Negative for back pain, gait problem and neck pain.   Skin:  Negative for rash.   Neurological:  Positive for weakness and headaches. Negative for dizziness, speech difficulty, light-headedness and numbness.   Psychiatric/Behavioral:  Negative for confusion.       Medications  Scheduled Meds:  Current Facility-Administered Medications   Medication Dose Route Frequency Provider Last Rate    acetaminophen  650 mg Oral Q6H PRN María Johnson PA-C      amLODIPine  10 mg Oral Daily Esther Farr MD      apixaban  2.5 mg Oral BID Esther Farr MD      aspirin  81 mg Oral Daily Esther Farr MD      atorvastatin  40 mg Oral Daily With Dinner Esther Farr MD      diphenhydrAMINE  25 mg Intravenous Q6H PRN Esther Farr MD      hydrALAZINE  5 mg Intravenous Q6H PRN Esther Farr MD      hydroxyurea  500 mg Oral Daily Esther Farr MD      levothyroxine  75 mcg Oral Early Morning Esther Farr MD      metoprolol succinate  50 mg Oral BID Esther Farr MD      pantoprazole  40 mg Oral Daily Before Breakfast Esther Farr MD       Continuous Infusions:   PRN Meds:.  acetaminophen    diphenhydrAMINE    hydrALAZINE    Historical Information   Past Medical History[1]  Past Surgical History[2]  Social History[3]  E-Cigarette/Vaping    E-Cigarette Use Never User      E-Cigarette/Vaping Substances    Nicotine No     THC No     CBD No     Flavoring No     Other No     Unknown No      Family History[4]    Objective :  Temp:  [97.4 °F (36.3 °C)-98.4 °F (36.9 °C)] 98 °F (36.7 °C)  HR:  [59-88] 61  BP: (122-223)/() 157/67  Resp:  [16-56] 20  SpO2:  [93 %-99 %] 97 %  O2  Device: None (Room air)    Physical Exam  Constitutional:       General: She is not in acute distress.     Appearance: Normal appearance. She is not ill-appearing, toxic-appearing or diaphoretic.   HENT:      Head: Normocephalic and atraumatic.      Mouth/Throat:      Mouth: Mucous membranes are moist.      Pharynx: Oropharynx is clear. No oropharyngeal exudate or posterior oropharyngeal erythema.     Eyes:      General: No scleral icterus.        Right eye: No discharge.         Left eye: No discharge.      Extraocular Movements: EOM normal. No nystagmus.      Conjunctiva/sclera: Conjunctivae normal.       Cardiovascular:      Rate and Rhythm: Normal rate and regular rhythm.   Pulmonary:      Effort: Pulmonary effort is normal. No respiratory distress.      Breath sounds: Normal breath sounds.   Abdominal:      General: There is no distension.      Palpations: Abdomen is soft.      Tenderness: There is no abdominal tenderness.     Musculoskeletal:         General: Normal range of motion.      Cervical back: Normal range of motion and neck supple.      Right lower leg: No edema.      Left lower leg: No edema.     Skin:     General: Skin is warm and dry.      Findings: No erythema or rash.     Neurological:      Mental Status: She is alert and oriented to person, place, and time.      Deep Tendon Reflexes:      Reflex Scores:       Bicep reflexes are 2+ on the right side and 2+ on the left side.       Brachioradialis reflexes are 2+ on the right side and 2+ on the left side.       Patellar reflexes are 1+ on the right side and 1+ on the left side.    Psychiatric:         Mood and Affect: Mood normal.         Speech: Speech normal.         Behavior: Behavior normal.     Neurological Exam  Mental Status  Alert. Oriented to person, place, time and situation. Oriented to person, place, and time. Speech is normal. Language is fluent with no aphasia. Attention and concentration are normal.    Cranial Nerves  CN II: Right  normal visual field. Left normal visual field.  CN III, IV, VI: Extraocular movements intact bilaterally. No nystagmus. Normal saccades.   Right pupil: 3 mm. Round. Reactive to light.   Left pupil: 3 mm. Round. Reactive to light.  CN V:  Right: Facial sensation is normal.  Left: Facial sensation is normal on the left.  CN VII:  Right: There is no facial weakness.  Left: There is no facial weakness.  CN IX, X: Palate elevates symmetrically  CN XI:  Right: Sternocleidomastoid strength is normal. Trapezius strength is normal.  Left: Sternocleidomastoid strength is normal. Trapezius strength is normal.  CN XII: Tongue midline without atrophy or fasciculations.    Motor  Normal muscle bulk throughout. No fasciculations present. Normal muscle tone. No abnormal involuntary movements. No pronator drift.  Motor strength 5/5 B/L UE and LE..    Sensory  Light touch is normal in upper and lower extremities. Temperature is normal in upper and lower extremities. Vibration is normal in upper and lower extremities.     Reflexes                                            Right                      Left  Brachioradialis                    2+                         2+  Biceps                                 2+                         2+  Patellar                                1+                         1+  Achilles                                Tr                         Tr  Right Plantar: downgoing  Left Plantar: downgoing    Coordination  Right: Finger-to-nose normal. Heel-to-shin normal.Left: Finger-to-nose normal. Heel-to-shin normal.    Gait    Deferred for safety..    NIHSS:    1a.Level of Consciousness: 0 = Alert   1b. LOC Questions: 0 = Answers both correctly   1c. LOC Commands: 0 = Obeys both correctly   2. Best Gaze: 0 = Normal   3. Visual: 0 = No visual field loss   4. Facial Palsy: 0=Normal symmetric movement   5a. Motor Right Arm: 0=No drift, limb holds 90 (or 45) degrees for full 10 seconds   5b. Motor Left Arm: 0=No  drift, limb holds 90 (or 45) degrees for full 10 seconds   6a. Motor Right Le=No drift, limb holds 90 (or 45) degrees for full 10 seconds   6b. Motor Left Le=No drift, limb holds 90 (or 45) degrees for full 10 seconds   7. Limb Ataxia:  0=Absent   8. Sensory: 0=Normal; no sensory loss   9. Best Language:  0=No aphasia, normal   10. Dysarthria: 0=Normal articulation   11. Extinction and Inattention (formerly Neglect): 0=No abnormality   Total Score: 0                   Lab Results: I have reviewed the following results:  Recent Results (from the past 24 hours)   ECG 12 lead    Collection Time: 25 10:33 AM   Result Value Ref Range    Ventricular Rate 64 BPM    Atrial Rate 357 BPM    IL Interval  ms    QRSD Interval 74 ms    QT Interval 400 ms    QTC Interval 412 ms    P Axis  degrees    QRS Axis -23 degrees    T Wave Axis 18 degrees   CBC and differential    Collection Time: 25 10:48 AM   Result Value Ref Range    WBC 4.72 4.31 - 10.16 Thousand/uL    RBC 3.05 (L) 3.81 - 5.12 Million/uL    Hemoglobin 11.5 11.5 - 15.4 g/dL    Hematocrit 36.4 34.8 - 46.1 %     (H) 82 - 98 fL    MCH 37.7 (H) 26.8 - 34.3 pg    MCHC 31.6 31.4 - 37.4 g/dL    RDW 15.5 (H) 11.6 - 15.1 %    MPV 10.4 8.9 - 12.7 fL    Platelets 252 149 - 390 Thousands/uL    nRBC 0 /100 WBCs    Segmented % 68 43 - 75 %    Immature Grans % 0 0 - 2 %    Lymphocytes % 23 14 - 44 %    Monocytes % 8 4 - 12 %    Eosinophils Relative 1 0 - 6 %    Basophils Relative 0 0 - 1 %    Absolute Neutrophils 3.17 1.85 - 7.62 Thousands/µL    Absolute Immature Grans 0.01 0.00 - 0.20 Thousand/uL    Absolute Lymphocytes 1.10 0.60 - 4.47 Thousands/µL    Absolute Monocytes 0.37 0.17 - 1.22 Thousand/µL    Eosinophils Absolute 0.05 0.00 - 0.61 Thousand/µL    Basophils Absolute 0.02 0.00 - 0.10 Thousands/µL   Basic metabolic panel    Collection Time: 07/09/25 10:48 AM   Result Value Ref Range    Sodium 136 135 - 147 mmol/L    Potassium 3.9 3.5 - 5.3 mmol/L     "Chloride 101 96 - 108 mmol/L    CO2 28 21 - 32 mmol/L    ANION GAP 7 4 - 13 mmol/L    BUN 16 5 - 25 mg/dL    Creatinine 0.82 0.60 - 1.30 mg/dL    Glucose 92 65 - 140 mg/dL    Calcium 9.2 8.4 - 10.2 mg/dL    eGFR 62 ml/min/1.73sq m   HS Troponin 0hr (reflex protocol)    Collection Time: 07/09/25 10:48 AM   Result Value Ref Range    hs TnI 0hr 6 \"Refer to ACS Flowchart\"- see link ng/L   Hemoglobin A1c w/EAG Estimation    Collection Time: 07/09/25 10:48 AM   Result Value Ref Range    Hemoglobin A1C 5.5 Normal 4.0-5.6%; PreDiabetic 5.7-6.4%; Diabetic >=6.5%; Glycemic control for adults with diabetes <7.0% %     mg/dl   UA w Reflex to Microscopic w Reflex to Culture    Collection Time: 07/09/25 11:21 AM    Specimen: Urine, Clean Catch   Result Value Ref Range    Color, UA Colorless     Clarity, UA Clear     Specific Gravity, UA <1.005 (L) 1.005 - 1.030    pH, UA 6.5 4.5, 5.0, 5.5, 6.0, 6.5, 7.0, 7.5, 8.0    Leukocytes, UA Negative Negative    Nitrite, UA Negative Negative    Protein, UA Negative Negative mg/dl    Glucose, UA Negative Negative mg/dl    Ketones, UA Negative Negative mg/dl    Urobilinogen, UA <2.0 <2.0 mg/dl mg/dl    Bilirubin, UA Negative Negative    Occult Blood, UA Trace (A) Negative   Urine Microscopic    Collection Time: 07/09/25 11:21 AM   Result Value Ref Range    RBC, UA 0-1 None Seen, 0-1, 1-2, 2-4, 0-5 /hpf    WBC, UA None Seen None Seen, 0-1, 1-2, 0-5, 2-4 /hpf    Epithelial Cells Occasional None Seen, Occasional /hpf    Bacteria, UA Occasional None Seen, Occasional /hpf   HS Troponin I 2hr    Collection Time: 07/09/25 12:28 PM   Result Value Ref Range    hs TnI 2hr 7 \"Refer to ACS Flowchart\"- see link ng/L    Delta 2hr hsTnI 1 <20 ng/L   Lipid Panel with Direct LDL reflex    Collection Time: 07/09/25 12:28 PM   Result Value Ref Range    Cholesterol 133 See Comment mg/dL    Triglycerides 115 See Comment mg/dL    HDL, Direct 59 >=50 mg/dL    LDL Calculated 51 0 - 100 mg/dL   HS Troponin I " "4hr    Collection Time: 07/09/25  4:13 PM   Result Value Ref Range    hs TnI 4hr 8 \"Refer to ACS Flowchart\"- see link ng/L    Delta 4hr hsTnI 2 <20 ng/L   CBC and differential    Collection Time: 07/10/25  6:43 AM   Result Value Ref Range    WBC 5.67 4.31 - 10.16 Thousand/uL    RBC 2.91 (L) 3.81 - 5.12 Million/uL    Hemoglobin 11.2 (L) 11.5 - 15.4 g/dL    Hematocrit 34.5 (L) 34.8 - 46.1 %     (H) 82 - 98 fL    MCH 38.5 (H) 26.8 - 34.3 pg    MCHC 32.5 31.4 - 37.4 g/dL    RDW 15.9 (H) 11.6 - 15.1 %    MPV 10.2 8.9 - 12.7 fL    Platelets 217 149 - 390 Thousands/uL    nRBC 0 /100 WBCs    Segmented % 70 43 - 75 %    Immature Grans % 0 0 - 2 %    Lymphocytes % 19 14 - 44 %    Monocytes % 9 4 - 12 %    Eosinophils Relative 1 0 - 6 %    Basophils Relative 1 0 - 1 %    Absolute Neutrophils 3.96 1.85 - 7.62 Thousands/µL    Absolute Immature Grans 0.01 0.00 - 0.20 Thousand/uL    Absolute Lymphocytes 1.10 0.60 - 4.47 Thousands/µL    Absolute Monocytes 0.53 0.17 - 1.22 Thousand/µL    Eosinophils Absolute 0.04 0.00 - 0.61 Thousand/µL    Basophils Absolute 0.03 0.00 - 0.10 Thousands/µL   Comprehensive metabolic panel    Collection Time: 07/10/25  6:43 AM   Result Value Ref Range    Sodium 139 135 - 147 mmol/L    Potassium 3.8 3.5 - 5.3 mmol/L    Chloride 105 96 - 108 mmol/L    CO2 28 21 - 32 mmol/L    ANION GAP 6 4 - 13 mmol/L    BUN 13 5 - 25 mg/dL    Creatinine 0.73 0.60 - 1.30 mg/dL    Glucose 91 65 - 140 mg/dL    Glucose, Fasting 91 65 - 99 mg/dL    Calcium 9.3 8.4 - 10.2 mg/dL    AST 14 13 - 39 U/L    ALT 7 7 - 52 U/L    Alkaline Phosphatase 53 34 - 104 U/L    Total Protein 6.5 6.4 - 8.4 g/dL    Albumin 3.5 3.5 - 5.0 g/dL    Total Bilirubin 0.45 0.20 - 1.00 mg/dL    eGFR 72 ml/min/1.73sq m     Imaging  Imaging Results Review: I personally reviewed the following image studies in PACS and associated radiology reports: CTH- L maxillary sinus fluid.    VTE Prophylaxis: Apixaban           [1]   Past Medical " History:  Diagnosis Date    Anxiety     GERD (gastroesophageal reflux disease)     Hypertension     Hypothyroid     Vertigo    [2]   Past Surgical History:  Procedure Laterality Date    BREAST BIOPSY Left 1974    CARDIAC ELECTROPHYSIOLOGY PROCEDURE Left 12/12/2024    Procedure: Cardiac pacer implant;  Surgeon: Ruma Emanuel MD;  Location: [facility];  Service: Cardiology    COLONOSCOPY  04/2018    HYSTERECTOMY     [3]   Social History  Tobacco Use    Smoking status: Former     Passive exposure: Never    Smokeless tobacco: Never   Vaping Use    Vaping status: Never Used   Substance and Sexual Activity    Alcohol use: Not Currently    Drug use: No   [4]   Family History  Problem Relation Name Age of Onset    Thrombosis Mother      Cancer Father      Diabetes Neg Hx

## 2025-07-10 NOTE — ASSESSMENT & PLAN NOTE
History of migraines that presents in a similar way . However havent had migraine for several years now.     Order PRN medication for now since migraine resolved

## 2025-07-10 NOTE — PHYSICAL THERAPY NOTE
"       PHYSICAL THERAPY EVALUATION/TREATMENT     07/10/25 0905   PT Last Visit   PT Visit Date 07/10/25   Note Type   Note type Evaluation   Pain Assessment   Pain Assessment Tool 0-10   Pain Score No Pain   Restrictions/Precautions   Other Precautions   ([place]/wears hearin aids)   Home Living   Type of Home House   Home Layout One level  (5 TAMIKO with a rail)   Home Equipment Cane;Walker  (does not use)   Prior Function   Level of [place] Independent with ADLs;Independent with functional mobility  (Pt ambulates without a device. Pt cooks and cleans.)   Lives With Alone   Receives Help From   (daughters visit often)   Falls in the last 6 months 0   Vocational Retired   General   Additional Pertinent History Pt admitted with frontal HAs now on the stroke pathway.  Pt denies any pain today.  Pt has a history of migraine head aches.  Pt admits to feeling weaker over the past few weeks.  MRI of the brain is pending.    Family/Caregiver Present No   Cognition   Overall Cognitive Status WFL   Orientation Level Oriented X4   Subjective   Subjective \"I am OK\"   UE Assessment   LUE Assessment  RUE assessment (ROM WFL, MMT 4/5)  ROM WFL, MMT 4/5   RLE Assessment   RLE Assessment   (ROM WFL, MMT 4/5)   LLE Assessment   LLE Assessment   (ROM WFL, MMT 4/5)   Bed Mobility   Supine to Sit 5  Supervision   Transfers   Sit to Stand 5  Supervision   Stand to Sit 5  Supervision   Stand pivot 4  Minimal assistance   Ambulation/Elevation   Gait Assistance   (handheld/min assist)   Distance 75 feet   Balance   Static Sitting Fair +   Static Standing Fair   Ambulatory Fair -   Activity Tolerance   Activity Tolerance Patient tolerated treatment well;Patient limited by fatigue   Assessment   Prognosis Good   Problem List Decreased strength;Impaired balance;Decreased mobility;Impaired hearing   Assessment Pt is 89 y.o. female seen for PT evaluation s/p admit to [facility] on 7/9/2025 w/ Stroke-like symptoms. PT consulted to assess " "pt's functional mobility and d/c needs. Order placed for PT eval and tx, w/ activity as tolerated order.  Co-morbidities affecting patient's physical performance include: htn, stroke like symptoms, migraine, TIA, anemia, anxiety.  Personal factors affecting patient at time of initial evaluation include: stairs to enter home, inability to navigate level surfaces without external assistance, and hearing impairments.         Prior to admission, patient was independent with functional mobility without assistive device and independent with ADLS.  Upon evaluation: Pt ambulated with hand held assist and with a walker x 100 feet each.  Pt is mildly unsteady without a walker but steady with use of a walker.  No focal deficits noted, just generalized weakness.        Please find objective findings from Physical Therapy assessment regarding body systems outlined above with impairments and limitations including impaired balance, decreased endurance, gait deviations, decreased activity tolerance, and fall risk. Patient's clinical presentation is currently unstable/unpredictable as seen in patient's presentation of increased fall risk, new onset of impairment of functional mobility, and new onset of weakness. Pt would benefit from continued Physical Therapy treatment to address deficits as defined above and maximize level of functional mobility.  As demonstrated by objective findings, the assigned level of complexity for this evaluation is high.    The patient's -[facility] Basic Mobility Inpatient Short Form Raw Score is 20. A Raw score of greater than 16 suggests the patient may benefit from discharge to home. Please also refer to the recommendation of the Physical Therapist for safe discharge planning.   Goals   Patient Goals \"go home\"   [facility] Expiration Date 07/17/25   Short Term Goal #1 1. independent transfers,   2. independent ambulation with/without a walker 50 feet,   3. superviison up and down 5 steps so pt can enter and exit " "her home   LTG Expiration Date 07/24/25   Long Term Goal #1 1. supervision ambulation outdoor level surfaces 120 feet with a steady gait,   2. No falls,   3. Pt will tolerate 30 minutes of    Plan   Treatment/Interventions Elevations;LE strengthening/ROM;Functional transfer training;Gait training;Bed mobility;Equipment eval/education;Patient/family training;Therapeutic exercise;Endurance training;Spoke to nursing   PT Frequency 3-5x/wk   Discharge Recommendation   Rehab Resource Intensity Level, PT III (Minimum Resource Intensity)   Equipment Recommended   (Pt has a cane and walker if needed at home.)   AM-PAC Basic Mobility Inpatient   Turning in Flat Bed Without Bedrails 4   Lying on Back to Sitting on Edge of Flat Bed Without Bedrails 4   Moving Bed to Chair 3   Standing Up From Chair Using Arms 3   Walk in Room 3   Climb 3-5 Stairs With Railing 3   Basic Mobility Inpatient Raw Score 20   Basic Mobility Standardized Score 43.99   [facility] Highest Level Of Mobility   -HL Goal 6: Walk 10 steps or more   -HLM Achieved 7: Walk 25 feet or more   Barthel Index   Feeding 5   Bathing 0   Grooming Score 0   Dressing Score 5   Bladder Score 10   Bowels Score 10   Toilet Use Score 5   Transfers (Bed/Chair) Score 10   Mobility (Level Surface) Score 0   Stairs Score 5   Barthel Index Score 50   Additional Treatment Session   Start Time 0855   End Time 0905   Treatment Assessment S: \"I am a little weak\"   O:  Gait training with a rolling walker x 100 feet with supervision assit.   A:  Pt's gait is improved with use of a rolling walker however pt declines wanting to use one.   P: Continue PT to improve independence with functional mobility and endurance to functional activity.   End of Consult   Patient Position at End of Consult Bed/Chair alarm activated;All needs within reach;Seated edge of bed  (Pt declined getting up to the chair. Pt set up with her breakfast.)   Licensure   [place] License Number  María Osmanyana PT " [id number]

## 2025-07-10 NOTE — ASSESSMENT & PLAN NOTE
Noted on presentation.  Currently patient is asymptomatic.  Blood pressure trending down with labetalol x 1.      - Increased amlodipine to 10 mg p.o. OD.  -Accelerated hypertension resolved.  -Discussed with the patient and family at bedside the importance of checking blood pressure daily and reporting to PCP per  Metrics

## 2025-07-10 NOTE — ASSESSMENT & PLAN NOTE
Reports onset of frontal headache that is throbbing associated with vision changes described as seeing floaters.  Headache started on Monday lasted several hours and self-resolved.  Recurred on Tuesday for the same duration and again recurred this morning.  Headache is moderate in intensity.  Does not radiate.  No associated auditory changes, nausea or vomiting, lightheadedness or dizziness.  No palpitations or chest pain or shortness of breath.     Patient reports remote history of migraines however have not had any in several years.  She was recently placed off Eliquis/aspirin for her biopsy on 7/10/2025.    ASA/Eliquis on hold for biopsy tomorrow.     CT head; left maxillary sinus fluid otherwise no acute findings.    Plan:     Restarted aspirin and Eliquis on admission will continue for now .   Check MRI brain  Check updated echo with bubble study  Neurochecks  Keep on stroke pathway  telemetry  Neurology on consult; appreciate input

## 2025-07-10 NOTE — ASSESSMENT & PLAN NOTE
History noted.  Plan for bone biopsy however canceled secondary to being hospitalized at this time.  Will follow up with PCP and reschedule accordingly.

## 2025-07-10 NOTE — DISCHARGE INSTR - AVS FIRST PAGE
Please make sure to follow up with your family doctor in 7 to 10 days  Please make sure to check her blood pressure daily and if you notice any blood pressure less than 110/60 or above 150/90 please call your family doctor  Keep blood pressure log and follow up with your family doctor

## 2025-07-10 NOTE — PLAN OF CARE
Problem: PAIN - ADULT  Goal: Verbalizes/displays adequate comfort level or baseline comfort level  Description: Interventions:  - Encourage patient to monitor pain and request assistance  - Assess pain using appropriate pain scale  - Administer analgesics as ordered based on type and severity of pain and evaluate response  - Implement non-pharmacological measures as appropriate and evaluate response  - Consider cultural and social influences on pain and pain management  - Notify physician/advanced practitioner if interventions unsuccessful or patient reports new pain  - Educate patient/family on pain management process including their role and importance of  reporting pain   - Provide non-pharmacologic/complimentary pain relief interventions  Outcome: Progressing     Problem: INFECTION - ADULT  Goal: Absence or prevention of progression during hospitalization  Description: INTERVENTIONS:  - Assess and monitor for signs and symptoms of infection  - Monitor lab/diagnostic results  - Monitor all insertion sites, i.e. indwelling lines, tubes, and drains  - Monitor endotracheal if appropriate and nasal secretions for changes in amount and color  - [place] appropriate cooling/warming therapies per order  - Administer medications as ordered  - Instruct and encourage patient and family to use good hand hygiene technique  - Identify and instruct in appropriate isolation precautions for identified infection/condition  Outcome: Progressing  Goal: Absence of fever/infection during neutropenic period  Description: INTERVENTIONS:  - Monitor WBC  - Perform strict hand hygiene  - Limit to healthy visitors only  - No plants, dried, fresh or silk flowers with mcneill in patient room  Outcome: Progressing     Problem: SAFETY ADULT  Goal: Patient will remain free of falls  Description: INTERVENTIONS:  - Educate patient/family on patient safety including physical limitations  - Instruct patient to call for assistance with activity   -  Consider consulting OT/PT to assist with strengthening/mobility based on AM PAC & JH-HLM score  - Consult OT/PT to assist with strengthening/mobility   - Keep Call bell within reach  - Keep bed low and locked with side rails adjusted as appropriate  - Keep care items and personal belongings within reach  - Initiate and maintain comfort rounds  - Make Fall Risk Sign visible to staff  - Offer Toileting every 2 Hours, in advance of need  - Initiate/Maintain bed/chair alarm  - Obtain necessary fall risk management equipment: bracelet/socks   - Apply yellow socks and bracelet for high fall risk patients  - Consider moving patient to room near nurses station  Outcome: Progressing  Goal: Maintain or return to baseline ADL function  Description: INTERVENTIONS:  -  Assess patient's ability to carry out ADLs; assess patient's baseline for ADL function and identify physical deficits which impact ability to perform ADLs (bathing, care of mouth/teeth, toileting, grooming, dressing, etc.)  - Assess/evaluate cause of self-care deficits   - Assess range of motion  - Assess patient's mobility; develop plan if impaired  - Assess patient's need for assistive devices and provide as appropriate  - Encourage maximum independence but intervene and supervise when necessary  - Involve family in performance of ADLs  - Assess for home care needs following discharge   - Consider OT consult to assist with ADL evaluation and planning for discharge  - Provide patient education as appropriate  - Monitor functional capacity and physical performance, use of AM PAC & JH-HLM   - Monitor gait, balance and fatigue with ambulation    Outcome: Progressing  Goal: Maintains/Returns to pre admission functional level  Description: INTERVENTIONS:  - Perform AM-PAC 6 Click Basic Mobility/ Daily Activity assessment daily.  - Set and communicate daily mobility goal to care team and patient/family/caregiver.   - Collaborate with rehabilitation services on mobility  goals if consulted  - Perform Range of Motion 12 times a day.  - Reposition patient every 2 hours.  - Dangle patient 3 times a day  - Stand patient 3 times a day  - Ambulate patient 3 times a day  - Out of bed to chair 3 times a day   - Out of bed for meals 3 times a day  - Out of bed for toileting  - Record patient progress and toleration of activity level   Outcome: Progressing     Problem: DISCHARGE PLANNING  Goal: Discharge to home or other facility with appropriate resources  Description: INTERVENTIONS:  - Identify barriers to discharge w/patient and caregiver  - Arrange for needed discharge resources and transportation as appropriate  - Identify discharge learning needs (meds, wound care, etc.)  - Arrange for interpretive services to assist at discharge as needed  - Refer to Case Management Department for coordinating discharge planning if the patient needs post-hospital services based on physician/advanced practitioner order or complex needs related to functional status, cognitive ability, or social support system  Outcome: Progressing     Problem: Knowledge Deficit  Goal: Patient/family/caregiver demonstrates understanding of disease process, treatment plan, medications, and discharge instructions  Description: Complete learning assessment and assess knowledge base.  Interventions:  - Provide teaching at level of understanding  - Provide teaching via preferred learning methods  Outcome: Progressing     Problem: Nutrition/Hydration-ADULT  Goal: Nutrient/Hydration intake appropriate for improving, restoring or maintaining nutritional needs  Description: Monitor and assess patient's nutrition/hydration status for malnutrition. Collaborate with interdisciplinary team and initiate plan and interventions as ordered.  Monitor patient's weight and dietary intake as ordered or per policy. Utilize nutrition screening tool and intervene as necessary. Determine patient's food preferences and provide high-protein,  high-caloric foods as appropriate.     INTERVENTIONS:  - Monitor oral intake, urinary output, labs, and treatment plans  - Assess nutrition and hydration status and recommend course of action  - Evaluate amount of meals eaten  - Assist patient with eating if necessary   - Allow adequate time for meals  - Recommend/ encourage appropriate diets, oral nutritional supplements, and vitamin/mineral supplements  - Order, calculate, and assess calorie counts as needed  - Recommend, monitor, and adjust tube feedings and TPN/PPN based on assessed needs  - Assess need for intravenous fluids  - Provide specific nutrition/hydration education as appropriate  - Include patient/family/caregiver in decisions related to nutrition  Outcome: Progressing     Problem: Neurological Deficit  Goal: Neurological status is stable or improving  Description: Interventions:  - Monitor and assess patient's level of consciousness, motor function, sensory function, and level of assistance needed for ADLs.   - Monitor and report changes from baseline. Collaborate with interdisciplinary team to initiate plan and implement interventions as ordered.   - Provide and maintain a safe environment.  - Consider seizure precautions.  - Consider fall precautions.  - Consider aspiration precautions.  - Consider bleeding precautions.  Outcome: Progressing     Problem: Activity Intolerance/Impaired Mobility  Goal: Mobility/activity is maintained at optimum level for patient  Description: Interventions:  - Assess and monitor patient  barriers to mobility and need for assistive/adaptive devices.  - Assess patient's emotional response to limitations.  - Collaborate with interdisciplinary team and initiate plans and interventions as ordered.  - Encourage independent activity per ability.  - Maintain proper body alignment.  - Perform active/passive rom as tolerated/ordered.  - Plan activities to conserve energy.  - Turn patient as appropriate  Outcome: Progressing      Problem: Potential for Aspiration  Goal: Non-ventilated patient's risk of aspiration is minimized  Description: Assess and monitor vital signs, respiratory status, and labs (WBC).  Monitor for signs of aspiration (tachypnea, cough, rales, wheezing, cyanosis, fever).    - Assess and monitor patient's ability to swallow.  - Place patient up in chair to eat if possible.  - HOB up at 90 degrees to eat if unable to get patient up into chair.  - Supervise patient during oral intake.   - Instruct patient/ family to take small bites.  - Instruct patient/ family to take small single sips when taking liquids.  - Follow patient-specific strategies generated by speech pathologist.  Outcome: Progressing     Problem: Nutrition  Goal: Nutrition/Hydration status is improving  Description: Monitor and assess patient's nutrition/hydration status for malnutrition (ex- brittle hair, bruises, dry skin, pale skin and conjunctiva, muscle wasting, smooth red tongue, and disorientation). Collaborate with interdisciplinary team and initiate plan and interventions as ordered.  Monitor patient's weight and dietary intake as ordered or per policy. Utilize nutrition screening tool and intervene per policy. Determine patient's food preferences and provide high-protein, high-caloric foods as appropriate.     - Assist patient with eating.  - Allow adequate time for meals.  - Encourage patient to take dietary supplement as ordered.  - Collaborate with clinical nutritionist.  - Include patient/family/caregiver in decisions related to nutrition.  Outcome: Progressing

## 2025-07-10 NOTE — ASSESSMENT & PLAN NOTE
Noted on presentation.  Currently patient is asymptomatic.  Blood pressure trending down with labetalol x 1.      - Restart home medications  -Continue to monitor vitals every 4 hours  - increase amlodipine to 10 mg OD   -accelerated HTN resolved.

## 2025-07-10 NOTE — DISCHARGE SUMMARY
Discharge Summary - Hospitalist   Name: Blank Mensah 89 y.o. female I MRN: [id number]  Unit/Bed#: [address] [place] [address] I Date of Admission: 7/9/2025   Date of Service: 7/10/2025 I Hospital Day: 0     Assessment & Plan  Acquired hypothyroidism  Continue PTA levothyroxine  Accelerated hypertension (Resolved: 7/10/2025)  Noted on presentation.  Currently patient is asymptomatic.  Blood pressure trending down with labetalol x 1.      - Increased amlodipine to 10 mg p.o. OD.  -Accelerated hypertension resolved.  -Discussed with the patient and family at bedside the importance of checking blood pressure daily and reporting to PCP per  Metrics  Mixed hyperlipidemia  On rosuvastatin transitioning to atorvastatin while hospitalized  Gastroesophageal reflux disease without esophagitis  Continue omeprazole  Essential thrombocytosis (HCC)  On hydroxyurea.  Will continue  S/P placement of cardiac pacemaker  History noted.  Stroke-like symptoms  Reports onset of frontal headache that is throbbing associated with vision changes described as seeing floaters.  Headache started on Monday lasted several hours and self-resolved.  Recurred on Tuesday for the same duration and again recurred this morning.  Headache is moderate in intensity.  Does not radiate.  No associated auditory changes, nausea or vomiting, lightheadedness or dizziness.  No palpitations or chest pain or shortness of breath.     Patient reports remote history of migraines however have not had any in several years.  She was recently placed off Eliquis/aspirin for her biopsy on 7/10/2025.    ASA/Eliquis on hold for biopsy tomorrow.     CT head; left maxillary sinus fluid otherwise no acute findings.  MRI: No acute infarct. Mild chronic microangiopathic change.   Echo: no PFO     Carotid duplex US: moderate stenosis. LT side progression compared to study from 2024     Plan:   No incidents noted on telemetry  Patient is cleared by neurologist for discharge.  Tylenol as needed  for migraines  Should the patient need to hold Eliquis risk versus benefit to be discussed with PCP regarding bridging with Lovenox while holding Eliquis as agreed with neurologist.  Discussed the plan above with the patient and family  Atrial fibrillation (HCC)  On eliquis and metoprolol.   Pacemaker for SSS     Follows up with  cardiology.   Monitor on telemetry  Migraine  History of migraines that presents in a similar way . However havent had migraine for several years now.     Order PRN medication for now since migraine resolved    Diverticulosis  Medical history noted  Lytic bone lesion of hip  History noted.  Plan for bone biopsy however canceled secondary to being hospitalized at this time.  Will follow up with PCP and reschedule accordingly.  Acute sinusitis  Pleading 7-day course of Augmentin.     Medical Problems       Resolved Problems  Date Reviewed: 7/10/2025          Resolved    Accelerated hypertension 7/10/2025     Resolved by  Esther Farr MD        Discharging Physician / Practitioner: Esther Farr MD  PCP: Andreia Barba DO  Admission Date:   Admission Orders (From admission, onward)       Ordered        07/09/25 1417  Place in Observation  Once                          Discharge Date: 07/10/25    Next Steps for Physician/AP Assuming Care:  Outpatient follow-up with PCP  Keep blood pressure homelog  Outpatient follow-up with PCP regarding rescheduling bone biopsy    Test Results Pending at Discharge (will require follow up):  None     Medication Changes for Discharge & Rationale:   Amlodipine 10 mg OD   See after visit summary for reconciled discharge medications provided to patient and/or family.     Consultations During Hospital Stay:  Neurology     Procedures Performed:   VAS carotid complete study   Final Result      MRI brain wo contrast   Final Result      1.  No acute infarct. Mild chronic microangiopathic change.   2.  Sinus disease as described. Fluid level in the left maxillary  "sinus suggesting acute sinusitis.   3.  Mild right and minimal left mastoid effusion.      Workstation performed: [id number]         XR follow up   Final Result      No acute cardiopulmonary disease.      MR-compatible left-sided pacemaker with intact leads. No abandoned leads.         Workstation performed: [id number]         CT head without contrast   Final Result      Left maxillary sinus fluid in keeping with hemorrhage.                  Workstation performed: [id number]               Significant Findings / Test Results:   Results for orders placed or performed during the hospital encounter of 07/09/25   CBC and differential   Result Value Ref Range    WBC 4.72 4.31 - 10.16 Thousand/uL    RBC 3.05 (L) 3.81 - 5.12 Million/uL    Hemoglobin 11.5 11.5 - 15.4 g/dL    Hematocrit 36.4 34.8 - 46.1 %     (H) 82 - 98 fL    MCH 37.7 (H) 26.8 - 34.3 pg    MCHC 31.6 31.4 - 37.4 g/dL    RDW 15.5 (H) 11.6 - 15.1 %    MPV 10.4 8.9 - 12.7 fL    Platelets 252 149 - 390 Thousands/uL    nRBC 0 /100 WBCs    Segmented % 68 43 - 75 %    Immature Grans % 0 0 - 2 %    Lymphocytes % 23 14 - 44 %    Monocytes % 8 4 - 12 %    Eosinophils Relative 1 0 - 6 %    Basophils Relative 0 0 - 1 %    Absolute Neutrophils 3.17 1.85 - 7.62 Thousands/µL    Absolute Immature Grans 0.01 0.00 - 0.20 Thousand/uL    Absolute Lymphocytes 1.10 0.60 - 4.47 Thousands/µL    Absolute Monocytes 0.37 0.17 - 1.22 Thousand/µL    Eosinophils Absolute 0.05 0.00 - 0.61 Thousand/µL    Basophils Absolute 0.02 0.00 - 0.10 Thousands/µL   Basic metabolic panel   Result Value Ref Range    Sodium 136 135 - 147 mmol/L    Potassium 3.9 3.5 - 5.3 mmol/L    Chloride 101 96 - 108 mmol/L    CO2 28 21 - 32 mmol/L    ANION GAP 7 4 - 13 mmol/L    BUN 16 5 - 25 mg/dL    Creatinine 0.82 0.60 - 1.30 mg/dL    Glucose 92 65 - 140 mg/dL    Calcium 9.2 8.4 - 10.2 mg/dL    eGFR 62 ml/min/1.73sq m   HS Troponin 0hr (reflex protocol)   Result Value Ref Range    hs TnI 0hr 6 \"Refer to " "ACS Flowchart\"- see link ng/L   UA w Reflex to Microscopic w Reflex to Culture    Specimen: Urine, Clean Catch   Result Value Ref Range    Color, UA Colorless     Clarity, UA Clear     Specific Gravity, UA <1.005 (L) 1.005 - 1.030    pH, UA 6.5 4.5, 5.0, 5.5, 6.0, 6.5, 7.0, 7.5, 8.0    Leukocytes, UA Negative Negative    Nitrite, UA Negative Negative    Protein, UA Negative Negative mg/dl    Glucose, UA Negative Negative mg/dl    Ketones, UA Negative Negative mg/dl    Urobilinogen, UA <2.0 <2.0 mg/dl mg/dl    Bilirubin, UA Negative Negative    Occult Blood, UA Trace (A) Negative   HS Troponin I 2hr   Result Value Ref Range    hs TnI 2hr 7 \"Refer to ACS Flowchart\"- see link ng/L    Delta 2hr hsTnI 1 <20 ng/L   Urine Microscopic   Result Value Ref Range    RBC, UA 0-1 None Seen, 0-1, 1-2, 2-4, 0-5 /hpf    WBC, UA None Seen None Seen, 0-1, 1-2, 0-5, 2-4 /hpf    Epithelial Cells Occasional None Seen, Occasional /hpf    Bacteria, UA Occasional None Seen, Occasional /hpf   HS Troponin I 4hr   Result Value Ref Range    hs TnI 4hr 8 \"Refer to ACS Flowchart\"- see link ng/L    Delta 4hr hsTnI 2 <20 ng/L   Lipid Panel with Direct LDL reflex   Result Value Ref Range    Cholesterol 133 See Comment mg/dL    Triglycerides 115 See Comment mg/dL    HDL, Direct 59 >=50 mg/dL    LDL Calculated 51 0 - 100 mg/dL   Hemoglobin A1c w/EAG Estimation   Result Value Ref Range    Hemoglobin A1C 5.5 Normal 4.0-5.6%; PreDiabetic 5.7-6.4%; Diabetic >=6.5%; Glycemic control for adults with diabetes <7.0% %     mg/dl   CBC and differential   Result Value Ref Range    WBC 5.67 4.31 - 10.16 Thousand/uL    RBC 2.91 (L) 3.81 - 5.12 Million/uL    Hemoglobin 11.2 (L) 11.5 - 15.4 g/dL    Hematocrit 34.5 (L) 34.8 - 46.1 %     (H) 82 - 98 fL    MCH 38.5 (H) 26.8 - 34.3 pg    MCHC 32.5 31.4 - 37.4 g/dL    RDW 15.9 (H) 11.6 - 15.1 %    MPV 10.2 8.9 - 12.7 fL    Platelets 217 149 - 390 Thousands/uL    nRBC 0 /100 WBCs    Segmented % 70 43 - 75 % "    Immature Grans % 0 0 - 2 %    Lymphocytes % 19 14 - 44 %    Monocytes % 9 4 - 12 %    Eosinophils Relative 1 0 - 6 %    Basophils Relative 1 0 - 1 %    Absolute Neutrophils 3.96 1.85 - 7.62 Thousands/µL    Absolute Immature Grans 0.01 0.00 - 0.20 Thousand/uL    Absolute Lymphocytes 1.10 0.60 - 4.47 Thousands/µL    Absolute Monocytes 0.53 0.17 - 1.22 Thousand/µL    Eosinophils Absolute 0.04 0.00 - 0.61 Thousand/µL    Basophils Absolute 0.03 0.00 - 0.10 Thousands/µL   Comprehensive metabolic panel   Result Value Ref Range    Sodium 139 135 - 147 mmol/L    Potassium 3.8 3.5 - 5.3 mmol/L    Chloride 105 96 - 108 mmol/L    CO2 28 21 - 32 mmol/L    ANION GAP 6 4 - 13 mmol/L    BUN 13 5 - 25 mg/dL    Creatinine 0.73 0.60 - 1.30 mg/dL    Glucose 91 65 - 140 mg/dL    Glucose, Fasting 91 65 - 99 mg/dL    Calcium 9.3 8.4 - 10.2 mg/dL    AST 14 13 - 39 U/L    ALT 7 7 - 52 U/L    Alkaline Phosphatase 53 34 - 104 U/L    Total Protein 6.5 6.4 - 8.4 g/dL    Albumin 3.5 3.5 - 5.0 g/dL    Total Bilirubin 0.45 0.20 - 1.00 mg/dL    eGFR 72 ml/min/1.73sq m   ECG 12 lead   Result Value Ref Range    Ventricular Rate 64 BPM    Atrial Rate 357 BPM    NV Interval  ms    QRSD Interval 74 ms    QT Interval 400 ms    QTC Interval 412 ms    P Axis  degrees    QRS Axis -23 degrees    T Wave Axis 18 degrees   Echo complete w/ contrast if indicated   Result Value Ref Range    BSA 1.47 m2    A4C EF 59 %    LVIDd 4.00 cm    LVIDS 2.80 cm    IVSd 1.10 cm    LVPWd 0.90 cm    LVOT diameter 1.9 cm    FS 30 28 - 44    MV E' Tissue Velocity Septal 8 cm/s    LA Volume Index (BP) 33.3 mL/m2    E/A ratio 2.37     E wave deceleration time 188 ms    MV Peak E Taco 116 cm/s    MV Peak A Taco 0.49 m/s    AV LVOT peak gradient 5 mmHg    RVID d 2.9 cm    Tricuspid annular plane systolic excursion 1.80 cm    LA size 2.8 cm    LA length (A2C) 5.50 cm    LA volume (BP) 49 mL    RAA A4C 18.2 cm2    AV peak gradient 10 mmHg    MV stenosis pressure 1/2 time 55 ms    MV  valve area p 1/2 method 4.00     TR Peak Taco 3.1 m/s    Triscuspid Valve Regurgitation Peak Gradient 37.0 mmHg    Ao root 3.40 cm    Tricuspid valve peak regurgitation velocity 3.05 m/s    Left ventricular stroke volume (2D) 42.00 mL    IVS 1.1 cm    LEFT VENTRICLE SYSTOLIC VOLUME (MOD BIPLANE) 2D 29 mL    LV DIASTOLIC VOLUME (MOD BIPLANE) 2D 71 mL    Left Atrium Area-systolic Four Chamber 16.3 cm2    Left Atrium Area-systolic Apical Two Chamber 21.1 cm2    LVSV, 2D 42 mL    LVOT area 2.83 cm2    LV EF 59          Incidental Findings:   None    I reviewed the above mentioned incidental findings with the patient and/or family and they expressed understanding.    Hospital Course:   Blank Mensah is a 89 y.o. female patient who originally presented to the hospital on 7/9/2025 due to strokelike symptoms with bifrontal headache and visual changes.  Resolved during hospitalization.  Patient remained asymptomatic while hospitalized.  No neurodeficits identified at time of my evaluation.  CT brain without acute findings.  MRI brain showing signs of acute maxillary sinusitis.  Patient started on Augmentin and will complete 7-day course.  Neurologist evaluated the patient and PTOT evaluated the patient.  Cleared the patient for discharge.  Outpatient follow-up with PCP.  Utilize as needed Tylenol for migraines.  Echocardiogram completed no PFO identified.  I had a lengthy discussion with the patient and daughter regarding accelerated hypertension noticed during hospitalization.  Increased amlodipine to 10 mg p.o. OD with normal attention at this time.  Encouraged the patient and daughter to check blood pressure daily with metrics as noted on AVS and follow up with PCP.  Also she will need to follow up regarding rescheduling bone biopsy.  Patient might need to be bridged with Lovenox while of Eliquis given cannot rule out TIA during this hospitalization.        The patient, initially admitted to the hospital as inpatient,  "was discharged earlier than expected given the following: Cleared by neurologist for discharge.  Symptoms resolved..  Please see above list of diagnoses and related plan for additional information.     Discharge Day Visit / Exam:   Subjective: Patient seen today at bedside.  Does not have any active complaints.  No chest pain or tightness, SOB or cough, dizziness or light headedness, N/V, Diarrhea of constipation.   No active urinary symptoms  Tolerating oral diet.     Vitals: Blood Pressure: 132/59 (07/10/25 1608)  Pulse: 71 (07/10/25 1608)  Temperature: 98 °F (36.7 °C) (07/10/25 1608)  Temp Source: Temporal (07/10/25 1133)  Respirations: 19 (07/10/25 1608)  Height: 5' 1\" (154.9 cm) (07/10/25 0820)  Weight - Scale: 49.9 kg (110 lb) (07/10/25 0820)  SpO2: 97 % (07/10/25 1608)  Physical Exam  Vitals reviewed.   Constitutional:       General: She is not in acute distress.     Appearance: Normal appearance.   HENT:      Head: Normocephalic and atraumatic.      Mouth/Throat:      Mouth: Mucous membranes are moist.     Eyes:      Conjunctiva/sclera: Conjunctivae normal.      Pupils: Pupils are equal, round, and reactive to light.       Cardiovascular:      Rate and Rhythm: Normal rate and regular rhythm.      Pulses: Normal pulses.           Carotid pulses are 2+ on the right side and 2+ on the left side.       Radial pulses are 2+ on the right side and 2+ on the left side.        Dorsalis pedis pulses are 2+ on the right side and 2+ on the left side.      Heart sounds: Normal heart sounds, S1 normal and S2 normal. No murmur heard.  Pulmonary:      Effort: No tachypnea, bradypnea or accessory muscle usage.      Breath sounds: Normal breath sounds and air entry. No decreased breath sounds, wheezing, rhonchi or rales.   Abdominal:      General: Abdomen is flat.      Tenderness: There is no abdominal tenderness.     Musculoskeletal:      Cervical back: Normal range of motion.      Right lower leg: No edema.      Left lower " leg: No edema.     Neurological:      Mental Status: She is alert and oriented to person, place, and time. Mental status is at baseline.      Cranial Nerves: No cranial nerve deficit.     Psychiatric:         Mood and Affect: Mood normal.         Behavior: Behavior normal.          Discussion with Family: Updated  (daughter) at bedside.    Discharge instructions/Information to patient and family:   See after visit summary for information provided to patient and family.      Provisions for Follow-Up Care:  See after visit summary for information related to follow-up care and any pertinent home health orders.      Mobility at time of Discharge:   Basic Mobility Inpatient Raw Score: 20  JH-HLM Goal: 6: Walk 10 steps or more  JH-HLM Achieved: 7: Walk 25 feet or more  HLM Goal achieved. Continue to encourage appropriate mobility.     Disposition:   Home    Planned Readmission: none     Administrative Statements   Discharge Statement:  I have spent a total time of 45 minutes in caring for this patient on the day of the visit/encounter. >30 minutes of time was spent on: Diagnostic results, Prognosis, Risks and benefits of tx options, and Instructions for management.    **Please Note: This note may have been constructed using a voice recognition system**

## 2025-07-10 NOTE — NURSING NOTE
AVS reviewed with patient and daughter and questions answered. All belongings taken and patient transported to car via wheelchair by Elsy TERRAZAS

## 2025-07-10 NOTE — PLAN OF CARE
Problem: PAIN - ADULT  Goal: Verbalizes/displays adequate comfort level or baseline comfort level  Description: Interventions:  - Encourage patient to monitor pain and request assistance  - Assess pain using appropriate pain scale  - Administer analgesics as ordered based on type and severity of pain and evaluate response  - Implement non-pharmacological measures as appropriate and evaluate response  - Consider cultural and social influences on pain and pain management  - Notify physician/advanced practitioner if interventions unsuccessful or patient reports new pain  - Educate patient/family on pain management process including their role and importance of  reporting pain   - Provide non-pharmacologic/complimentary pain relief interventions  7/10/2025 0503 by Claire Aleman RN  Outcome: Progressing  7/10/2025 0018 by Claire Aleman RN  Outcome: Progressing     Problem: INFECTION - ADULT  Goal: Absence or prevention of progression during hospitalization  Description: INTERVENTIONS:  - Assess and monitor for signs and symptoms of infection  - Monitor lab/diagnostic results  - Monitor all insertion sites, i.e. indwelling lines, tubes, and drains  - Monitor endotracheal if appropriate and nasal secretions for changes in amount and color  - [place] appropriate cooling/warming therapies per order  - Administer medications as ordered  - Instruct and encourage patient and family to use good hand hygiene technique  - Identify and instruct in appropriate isolation precautions for identified infection/condition  7/10/2025 0503 by Claire Aleman RN  Outcome: Progressing  7/10/2025 0018 by Claire Aleman RN  Outcome: Progressing  Goal: Absence of fever/infection during neutropenic period  Description: INTERVENTIONS:  - Monitor WBC  - Perform strict hand hygiene  - Limit to healthy visitors only  - No plants, dried, fresh or silk flowers with mcneill in patient room  7/10/2025 0503 by Claire Aleman RN  Outcome:  Progressing  7/10/2025 0018 by Claire Aleman, RN  Outcome: Progressing     Problem: SAFETY ADULT  Goal: Patient will remain free of falls  Description: INTERVENTIONS:  - Educate patient/family on patient safety including physical limitations  - Instruct patient to call for assistance with activity   - Consider consulting OT/PT to assist with strengthening/mobility based on AM PAC & JH-HLM score  - Consult OT/PT to assist with strengthening/mobility   - Keep Call bell within reach  - Keep bed low and locked with side rails adjusted as appropriate  - Keep care items and personal belongings within reach  - Initiate and maintain comfort rounds  - Make Fall Risk Sign visible to staff  - Offer Toileting every 2 Hours, in advance of need  - Initiate/Maintain bed/chair alarm  - Obtain necessary fall risk management equipment: bracelet/socks   - Apply yellow socks and bracelet for high fall risk patients  - Consider moving patient to room near nurses station  7/10/2025 0503 by Claire Aleman, RN  Outcome: Progressing  7/10/2025 0018 by Claire Aleman RN  Outcome: Progressing  Goal: Maintain or return to baseline ADL function  Description: INTERVENTIONS:  -  Assess patient's ability to carry out ADLs; assess patient's baseline for ADL function and identify physical deficits which impact ability to perform ADLs (bathing, care of mouth/teeth, toileting, grooming, dressing, etc.)  - Assess/evaluate cause of self-care deficits   - Assess range of motion  - Assess patient's mobility; develop plan if impaired  - Assess patient's need for assistive devices and provide as appropriate  - Encourage maximum independence but intervene and supervise when necessary  - Involve family in performance of ADLs  - Assess for home care needs following discharge   - Consider OT consult to assist with ADL evaluation and planning for discharge  - Provide patient education as appropriate  - Monitor functional capacity and physical performance, use  of AM PAC & -HLM   - Monitor gait, balance and fatigue with ambulation    7/10/2025 0503 by Claire Aleman RN  Outcome: Progressing  7/10/2025 0018 by Claire Aleman RN  Outcome: Progressing  Goal: Maintains/Returns to pre admission functional level  Description: INTERVENTIONS:  - Perform AM-PAC 6 Click Basic Mobility/ Daily Activity assessment daily.  - Set and communicate daily mobility goal to care team and patient/family/caregiver.   - Collaborate with rehabilitation services on mobility goals if consulted  - Perform Range of Motion 12 times a day.  - Reposition patient every 2 hours.  - Dangle patient 3 times a day  - Stand patient 3 times a day  - Ambulate patient 3 times a day  - Out of bed to chair 3 times a day   - Out of bed for meals 3 times a day  - Out of bed for toileting  - Record patient progress and toleration of activity level   7/10/2025 0503 by Claire Aleman RN  Outcome: Progressing  7/10/2025 0018 by Claire Aleman RN  Outcome: Progressing     Problem: DISCHARGE PLANNING  Goal: Discharge to home or other facility with appropriate resources  Description: INTERVENTIONS:  - Identify barriers to discharge w/patient and caregiver  - Arrange for needed discharge resources and transportation as appropriate  - Identify discharge learning needs (meds, wound care, etc.)  - Arrange for interpretive services to assist at discharge as needed  - Refer to Case Management Department for coordinating discharge planning if the patient needs post-hospital services based on physician/advanced practitioner order or complex needs related to functional status, cognitive ability, or social support system  7/10/2025 0503 by Claire Aleman RN  Outcome: Progressing  7/10/2025 0018 by Claire Aleman RN  Outcome: Progressing     Problem: Knowledge Deficit  Goal: Patient/family/caregiver demonstrates understanding of disease process, treatment plan, medications, and discharge instructions  Description: Complete  learning assessment and assess knowledge base.  Interventions:  - Provide teaching at level of understanding  - Provide teaching via preferred learning methods  7/10/2025 0503 by Claire Aleman RN  Outcome: Progressing  7/10/2025 0018 by Claire Aleman RN  Outcome: Progressing     Problem: Nutrition/Hydration-ADULT  Goal: Nutrient/Hydration intake appropriate for improving, restoring or maintaining nutritional needs  Description: Monitor and assess patient's nutrition/hydration status for malnutrition. Collaborate with interdisciplinary team and initiate plan and interventions as ordered.  Monitor patient's weight and dietary intake as ordered or per policy. Utilize nutrition screening tool and intervene as necessary. Determine patient's food preferences and provide high-protein, high-caloric foods as appropriate.     INTERVENTIONS:  - Monitor oral intake, urinary output, labs, and treatment plans  - Assess nutrition and hydration status and recommend course of action  - Evaluate amount of meals eaten  - Assist patient with eating if necessary   - Allow adequate time for meals  - Recommend/ encourage appropriate diets, oral nutritional supplements, and vitamin/mineral supplements  - Order, calculate, and assess calorie counts as needed  - Recommend, monitor, and adjust tube feedings and TPN/PPN based on assessed needs  - Assess need for intravenous fluids  - Provide specific nutrition/hydration education as appropriate  - Include patient/family/caregiver in decisions related to nutrition  7/10/2025 0503 by Claire Aleman RN  Outcome: Progressing  7/10/2025 0018 by Claire Aleman RN  Outcome: Progressing     Problem: Neurological Deficit  Goal: Neurological status is stable or improving  Description: Interventions:  - Monitor and assess patient's level of consciousness, motor function, sensory function, and level of assistance needed for ADLs.   - Monitor and report changes from baseline. Collaborate with  interdisciplinary team to initiate plan and implement interventions as ordered.   - Provide and maintain a safe environment.  - Consider seizure precautions.  - Consider fall precautions.  - Consider aspiration precautions.  - Consider bleeding precautions.  7/10/2025 0503 by Claire Aleman RN  Outcome: Progressing  7/10/2025 0018 by Claire Aleman RN  Outcome: Progressing     Problem: Activity Intolerance/Impaired Mobility  Goal: Mobility/activity is maintained at optimum level for patient  Description: Interventions:  - Assess and monitor patient  barriers to mobility and need for assistive/adaptive devices.  - Assess patient's emotional response to limitations.  - Collaborate with interdisciplinary team and initiate plans and interventions as ordered.  - Encourage independent activity per ability.  - Maintain proper body alignment.  - Perform active/passive rom as tolerated/ordered.  - Plan activities to conserve energy.  - Turn patient as appropriate  7/10/2025 0503 by Claire Aleman RN  Outcome: Progressing  7/10/2025 0018 by Claire Aleman RN  Outcome: Progressing     Problem: Potential for Aspiration  Goal: Non-ventilated patient's risk of aspiration is minimized  Description: Assess and monitor vital signs, respiratory status, and labs (WBC).  Monitor for signs of aspiration (tachypnea, cough, rales, wheezing, cyanosis, fever).    - Assess and monitor patient's ability to swallow.  - Place patient up in chair to eat if possible.  - HOB up at 90 degrees to eat if unable to get patient up into chair.  - Supervise patient during oral intake.   - Instruct patient/ family to take small bites.  - Instruct patient/ family to take small single sips when taking liquids.  - Follow patient-specific strategies generated by speech pathologist.  7/10/2025 0503 by Claire Aleman RN  Outcome: Progressing  7/10/2025 0018 by Claire Aleman RN  Outcome: Progressing     Problem: Nutrition  Goal: Nutrition/Hydration status  is improving  Description: Monitor and assess patient's nutrition/hydration status for malnutrition (ex- brittle hair, bruises, dry skin, pale skin and conjunctiva, muscle wasting, smooth red tongue, and disorientation). Collaborate with interdisciplinary team and initiate plan and interventions as ordered.  Monitor patient's weight and dietary intake as ordered or per policy. Utilize nutrition screening tool and intervene per policy. Determine patient's food preferences and provide high-protein, high-caloric foods as appropriate.     - Assist patient with eating.  - Allow adequate time for meals.  - Encourage patient to take dietary supplement as ordered.  - Collaborate with clinical nutritionist.  - Include patient/family/caregiver in decisions related to nutrition.  7/10/2025 0503 by Claire Aleman, RN  Outcome: Progressing  7/10/2025 0018 by Claire Aleman, RN  Outcome: Progressing

## 2025-07-10 NOTE — SPEECH THERAPY NOTE
SLP Note      Patient Name: Blank Mensah    Today's Date: 7/10/2025     Problem List  Active Problems:    Acquired hypothyroidism    Accelerated hypertension    Mixed hyperlipidemia    Gastroesophageal reflux disease without esophagitis    Essential thrombocytosis (HCC)    S/P placement of cardiac pacemaker    Stroke-like symptoms    Atrial fibrillation (HCC)    Migraine      Current Status  Blank Mensah is a 89 y.o. female with a PMH of hypertension, hyperlipidemia, hypothyroidism, atrial fibrillation, cerebral artery stenosis who presented 7/9 with episodes of frontal headaches and change in vision that started on Monday (remote h/o migraines with similar sxs).    DX:  accelerated htn, essential thrombocytosis, stroke-like sxs.    SLP screening assessment completed bedside s/p MRI (no results as of yet).  Pt passed RN and my Dysphagia Screening Assessment and denied s/s oral/pharyngeal dysphagia.  Communication deficits denied and none noted in conversation (pt alert, oriented with no s/s anomia, aphasia or dysarthria in conversation).  No additional inpatient Speech Pathology evaluation appears indicated at this time.  Please re-consult if additional concerns arise. Thank you.     Jessika George MS,SLP  [place] License [id number]  [place] License [id number]

## 2025-07-10 NOTE — OCCUPATIONAL THERAPY NOTE
Occupational Therapy Evaluation/Treatment       07/10/25 0948   OT Last Visit   OT Visit Date 07/10/25   Note Type   Note type Evaluation   Pain Assessment   Pain Assessment Tool 0-10   Pain Score No Pain   Restrictions/Precautions   Other Precautions Hard of hearing  (wears hearing aids)   Home Living   Type of Home House   Home Layout One level;Stairs to enter with rails;Performs ADLs on one level;Able to live on main level with bedroom/bathroom   Bathroom Shower/Tub Tub/shower unit   Bathroom Toilet Raised   Bathroom Equipment Grab bars in shower   Home Equipment Walker;Cane   Additional Comments pt reports amb independently at baseline without device   Prior Function   Level of [place] Independent with functional mobility;Independent with ADLs;Needs assistance with IADLS  (does not drive)   Lives With Alone   Receives Help From Family  (supportive daughters)   IADLs Independent with meal prep;Independent with medication management;Family/Friend/Other provides transportation   Falls in the last 6 months 0   Vocational Retired   General   Additional Pertinent History Pt presents with episodes of frontal headaches and visual changes. At time of eval pt reports symptoms are resolved.   Subjective   Subjective Pt agreeable to OT evaluation   ADL   Eating Assistance 7  Independent   Grooming Assistance 6  Modified Independent   UB Bathing Assistance 6  Modified Independent   LB Bathing Assistance 5  Supervision/Setup   UB Dressing Assistance 6  Modified independent   LB Dressing Assistance 5  Supervision/Setup   Toileting Assistance  5  Supervision/Setup   Bed Mobility   Supine to Sit 5  Supervision   Sit to Supine 5  Supervision   Transfers   Sit to Stand 5  Supervision   Stand to Sit 5  Supervision   Stand pivot 4  Minimal assistance   Functional Mobility   Functional Mobility   (min A/supervision)   Additional Comments short household distances   Additional items Hand hold assistance   Balance   Static  Sitting Fair +   Dynamic Sitting Fair   Static Standing Fair   Dynamic Standing Fair -   Activity Tolerance   Activity Tolerance Patient tolerated treatment well;Patient limited by fatigue   Nurse Made Aware RN Radha   RUE Assessment   RUE Assessment   (ROM WFL, MMT grossly 4/5)   LUE Assessment   LUE Assessment   (ROM WFL, MMT grossly 4/5)   Hand Function   Gross Motor Coordination Functional   Fine Motor Coordination Functional   Sensation   Light Touch No apparent deficits   Proprioception   Proprioception No apparent deficits   Vision-Basic Assessment   Current Vision Wears glasses only for reading   Patient Visual Report   (pt denies visual changes or complaints)   Cognition   Overall Cognitive Status WFL   Arousal/Participation Alert;Cooperative   Attention Within functional limits   Orientation Level Oriented X4   Memory Within functional limits   Following Commands Follows all commands and directions without difficulty   Assessment   Limitation Decreased ADL status;Decreased endurance;Decreased self-care trans;Decreased high-level ADLs  (decreased balance and mobility)   Prognosis Good   Assessment Patient evaluated by Occupational Therapy.  Patient admitted with Stroke-like symptoms.  The patients occupational profile, medical and therapy history includes a extensive additional review of physical, cognitive, or psychosocial history related to current functional performance.  Comorbidities affecting functional mobility and ADLS include: hypothyroidism, HTN, HLD, GERD, thrombocytosis, cardiac pacemaker, a-fib, migraine, cerebral artery stenosis.  Prior to admission, patient was independent with functional mobility without assistive device, independent with ADLS, and independent with IADLS.  The evaluation identifies the following performance deficits: weakness, impaired balance, decreased endurance, new onset of impairment of functional mobility, decreased ADLS, decreased IADLS, and decreased activity  tolerance, that result in activity limitations and/or participation restrictions. This evaluation requires clinical decision making of high complexity, because the patient presents with comorbidites that affect occupational performance and required significant modification of tasks or assistance with consideration of multiple treatment options.  The Barthel Index was used as a functional outcome tool presenting with a score of 55, indicating marked limitations of functional mobility and ADLS.  The patient's raw score on the AM-PAC Daily Activity Inpatient Short Form is 19. A raw score of greater than or equal to 19 suggests the patient may benefit from discharge to home. Please refer to the recommendation of the Occupational Therapist for safe discharge planning.  Patient will benefit from skilled Occupational Therapy services to address above deficits and facilitate a safe return to prior level of function.   Goals   Patient Goals to go home   STG Time Frame   (1-7 days)   Short Term Goal  Goals established to promote Patient Goals: to go home: Grooming: independent standing at sink; Bathing: independent; Upper Body Dressing independent; Lower Body Dressing: independent; Toileting: independent; Patient will increase ambulatory bed, chair, commode transfer to independent with least restrictive assistive device to increase performance and safety with ADLS and functional mobility; Patient will increase standing tolerance to 5 minutes during ADL task to decrease assistance level and decrease fall risk; Patient will increase bed mobility to independent in preparation for ADLS and transfers; Patient will increase functional mobility to and from bathroom with least restrictive assistive device independently to increase performance with ADLS and to use a toilet; Patient will tolerate 8 minutes of UE ROM/strengthening to increase general activity tolerance and performance in ADLS/IADLS; Patient will improve functional  activity tolerance to 10 minutes of sustained functional tasks to increase participation in basic self-care and decrease assistance level;  Patient will be able to to verbalize understanding and perform energy conservation/proper body mechanics during ADLS and functional mobility at least 75% of the time with minimal cueing to decrease signs of fatigue and increase stamina to return to prior level of function; Patient will increase dynamic sitting balance to fair+ to improve the ability to sit at edge of bed or on a chair for ADLS;  Patient will increase dynamic standing balance to fair to improve postural stability and decrease fall risk during standing ADLS and transfers.  Patient will tolerate out of bed to chair for all meals to increase ability to feed self and actively engage in ADLS.   LTG Time Frame   (8-14 days)   Long Term Goal Patient will increase standing tolerance to 10 minutes during ADL task to decrease assistance level and decrease fall risk; Patient will tolerate 15 minutes of UE ROM/strengthening to increase general activity tolerance and performance in ADLS/IADLS; Patient will improve functional activity tolerance to 20 minutes of sustained functional tasks to increase participation in basic self-care and decrease assistance level;  Patient will be able to to verbalize understanding and perform energy conservation/proper body mechanics during ADLS and functional mobility at least 90% of the time with no cueing to decrease signs of fatigue and increase stamina to return to prior level of function; Patient will increase dynamic sitting balance to good to improve the ability to sit at edge of bed or on a chair for ADLS;  Patient will increase dynamic standing balance to fair+ to improve postural stability and decrease fall risk during standing ADLS and transfers. Pt will score >/= 23/24 on AM-PAC Daily Activity Inpatient scale to promote safe independence with ADLs and functional mobility; Pt will  score >/= 85/100 on Barthel Index in order to decrease caregiver assistance needed and increase ability to perform ADLs and functional mobility.   Plan   Treatment Interventions ADL retraining;Functional transfer training;UE strengthening/ROM;Endurance training;Patient/family training;Equipment evaluation/education;Activityengagement;Compensatory technique education;Energy conservation   Goal Expiration Date 07/24/25   OT Frequency 3-5x/wk   Discharge Recommendation   Rehab Resource Intensity Level, OT No post-acute rehabilitation needs   AM-PAC Daily Activity Inpatient   Lower Body Dressing 3   Bathing 3   Toileting 3   Upper Body Dressing 3   Grooming 3   Eating 4   Daily Activity Raw Score 19   Daily Activity Standardized Score (Calc for Raw Score >=11) 40.22   AM-PAC Applied Cognition Inpatient   Following a Speech/Presentation 4   Understanding Ordinary Conversation 4   Taking Medications 4   Remembering Where Things Are Placed or Put Away 4   Remembering List of 4-5 Errands 4   Taking Care of Complicated Tasks 4   Applied Cognition Raw Score 24   Applied Cognition Standardized Score 62.21   Barthel Index   Feeding 10   Bathing 0   Grooming Score 0   Dressing Score 5   Bladder Score 10   Bowels Score 10   Toilet Use Score 5   Transfers (Bed/Chair) Score 10   Mobility (Level Surface) Score 0   Stairs Score 5   Barthel Index Score 55   Additional Treatment Session   Start Time 0938   End Time 0948   Treatment Assessment Pt completed standard toilet transfer with handheld assist, min A progressing to supervision. Voided bladder, clothing mgmt and hygiene with supervision. Sit to stand with supervision. Washing hands at sink supervision. Additional fxl mobility handheld assist supervision. Pt tolerated OT treatment session well. Pt would benefit from cont OT services to maximize safety and fxl performance of ADLs. Anticipate no OT needs once d/c'd.     María Maldonado OTR/L   [place] License # [id number]  [place] License #  [id number]

## 2025-07-10 NOTE — PROGRESS NOTES
Patient arrived to MRI appointment via wheelchair from inpatient unit [address] [place] [address]. Patient is being evaluated on today's exam for MRI of Brain. Zion Eric PA-C from Cardiology department has signed general consent for imaging and care interventions as related to today's study 07/10/2025. Patient does have a Medtronic [place] pacemaker in place, and as such is being monitored for the duration of today's imaging study.     Remote device evaluation for Medtronic Tammy Pacemaker was completed pre-procedure by myself Myra Ly RN with assistance of Medtronic Tablet using pre-programmed cardiac agorithm software.  Device settings adjusted for MRI safe compatibility/ MRI Sure-Scan mode. Baseline settings of AAIR-DDDR (low threshold 60 bpm, high threshold 110 bpm) changed to DOO with programmed rate of 85 bpm.     Patient tolerated imaging study without incidence. (Please see procedural section of patient's chart to view vital signs which were continuously monitored & documented at q5min intervals for duration of patient's imaging appointment). Device again remotely interrogated by myself Myra Ly RN with assistance of Medtronic Tablet pre-programmed cardiac algorithm software. MRI Sure Scan mode turned off, and patient's device returned to baseline settings of AAIR-DDDR (low threshold 60 bpm, and high threshold 110 bpm) at completion of the MRI imaging study. Normal device function confirmed prior to discharge from the MRI suite.    Patient transported back to inpatient unit [address] [place] [address] via wheelchair by Nicole CANNON Radiology andree.

## 2025-07-10 NOTE — ASSESSMENT & PLAN NOTE
Reports onset of frontal headache that is throbbing associated with vision changes described as seeing floaters.  Headache started on Monday lasted several hours and self-resolved.  Recurred on Tuesday for the same duration and again recurred this morning.  Headache is moderate in intensity.  Does not radiate.  No associated auditory changes, nausea or vomiting, lightheadedness or dizziness.  No palpitations or chest pain or shortness of breath.     Patient reports remote history of migraines however have not had any in several years.  She was recently placed off Eliquis/aspirin for her biopsy on 7/10/2025.    ASA/Eliquis on hold for biopsy tomorrow.     CT head; left maxillary sinus fluid otherwise no acute findings.  MRI: No acute infarct. Mild chronic microangiopathic change.   Echo: no PFO     Carotid duplex US: moderate stenosis. LT side progression compared to study from 2024     Plan:   No incidents noted on telemetry  Patient is cleared by neurologist for discharge.  Tylenol as needed for migraines  Should the patient need to hold Eliquis risk versus benefit to be discussed with PCP regarding bridging with Lovenox while holding Eliquis as agreed with neurologist.  Discussed the plan above with the patient and family

## 2025-07-10 NOTE — ASSESSMENT & PLAN NOTE
89 yo female with HTN, HLD, GERD, essential thrombocytosis, hypothyroidism, atrial fibrillation maintained on Eliquis, s/p PPM admitted on 7/9/2025 with complaint of frontal HA and change in vision beginning on Monday 7/7/2025.     Neuroimaging   7/9/2025 CTH:   Left maxillary sinus fluid.    Symptoms typical of patient's prior migraine but has not had a migraine in many years and would be unusual for migraines to recur in this age range. Given concern for possible underlying malignancy, cannot entirely exclude ischemic stroke vs metastasis. Patient reports symptoms have resolved at this time and she feels improved, but does report feeling generally weak.     Plan:   - Check MRI brain with and without contrast given concern for possible malignancy. Spoke with MRI team and able to add contrast to study.  - Echocardiogram completed and results are pending.   - Monitor on telemetry.   - Check carotid dopplers.  - Lipid panel reviewed, total cholesterol 133, triglycerides 115, HDL 59, LDL 51.  - Hemoglobin A1c reviewed, 5.5.  - Continue on home dose of Eliquis 2.5 mg BID (appropriately on lower dose in setting of age and weight) and ASA 81 mg QD.  - Continue atorvastatin 40 mg QPM.   - Goal normotension, normothermia and euglycemia.   - PT/OT  - Stroke education.   - Monitor exam and notify with changes.

## 2025-07-10 NOTE — PROGRESS NOTES
Progress Note - Hospitalist   Name: Blank Mensah 89 y.o. female I MRN: [id number]  Unit/Bed#: [address] [place] [address] I Date of Admission: 7/9/2025   Date of Service: 7/10/2025 I Hospital Day: 0    Assessment & Plan  Acquired hypothyroidism  Continue PTA levothyroxine  Accelerated hypertension  Noted on presentation.  Currently patient is asymptomatic.  Blood pressure trending down with labetalol x 1.      - Restart home medications  -Continue to monitor vitals every 4 hours  - increase amlodipine to 10 mg OD   -accelerated HTN resolved.   Mixed hyperlipidemia  On rosuvastatin transitioning to atorvastatin while hospitalized  Gastroesophageal reflux disease without esophagitis  Continue omeprazole  Essential thrombocytosis (HCC)  On hydroxyurea.  Will continue  S/P placement of cardiac pacemaker  History noted.  Stroke-like symptoms  Reports onset of frontal headache that is throbbing associated with vision changes described as seeing floaters.  Headache started on Monday lasted several hours and self-resolved.  Recurred on Tuesday for the same duration and again recurred this morning.  Headache is moderate in intensity.  Does not radiate.  No associated auditory changes, nausea or vomiting, lightheadedness or dizziness.  No palpitations or chest pain or shortness of breath.     Patient reports remote history of migraines however have not had any in several years.  She was recently placed off Eliquis/aspirin for her biopsy on 7/10/2025.    ASA/Eliquis on hold for biopsy tomorrow.     CT head; left maxillary sinus fluid otherwise no acute findings.    Plan:     Restarted aspirin and Eliquis on admission will continue for now .   Check MRI brain  Check updated echo with bubble study  Neurochecks  Keep on stroke pathway  telemetry  Neurology on consult; appreciate input  Atrial fibrillation (HCC)  On eliquis and metoprolol.   Pacemaker for SSS     Follows up with  cardiology.   Monitor on telemetry  Migraine  History of  migraines that presents in a similar way . However havent had migraine for several years now.     Order PRN medication for now since migraine resolved  Neurology consult   Diverticulosis  Chronic condition.  Not in acute exacerbation.  Lytic bone lesion of hip  CT: 1. Destructive/lytic lesion involving the posterior superior left acetabulum and posteromedial left iliac bone and mixed sclerotic and lucent disease further superiorly in the left iliac bone. Findings are most suspicious for a metastatic lesion with   less likely possibilities of multiple myeloma or primary neoplasm of bone.      - Scheduled for pulm biopsy on 7/10/2025.  Aspirin and Eliquis were held by PCP for several days  Restarted on admission with concern for stroke. Will have to postpone biopsy     VTE Pharmacologic Prophylaxis:   Moderate Risk (Score 3-4) - Pharmacological DVT Prophylaxis Ordered: apixaban (Eliquis).    Mobility:   Basic Mobility Inpatient Raw Score: 21  JH-HLM Goal: 6: Walk 10 steps or more  JH-HLM Achieved: 7: Walk 25 feet or more  JH-HLM Goal achieved. Continue to encourage appropriate mobility.    Patient Centered Rounds: I performed bedside rounds with nursing staff today.   Discussions with Specialists or Other Care Team Provider: neurology     Education and Discussions with Family / Patient: Updated  (daughter) via phone.    Current Length of Stay: 0 day(s)  Current Patient Status: Observation   Certification Statement: The patient will continue to require additional inpatient hospital stay due to stroke like symptoms, accelerated hypertension, migraine   Discharge Plan: Anticipate discharge in 24-48 hrs to home.    Code Status: Level 1 - Full Code    Subjective   Patient seen today at bedside. Reports no headaches. Reports no vision changes .   No chest pain or tightness, SOB or cough, dizziness or light headedness, N/V, Diarrhea of constipation.   No active urinary symptoms  Tolerating oral diet.        Objective :  Temp:  [97.4 °F (36.3 °C)-98.4 °F (36.9 °C)] 98 °F (36.7 °C)  HR:  [59-88] 61  BP: (122-223)/() 157/67  Resp:  [16-56] 20  SpO2:  [93 %-99 %] 97 %  O2 Device: None (Room air)    Body mass index is 20.78 kg/m².     Input and Output Summary (last 24 hours):     Intake/Output Summary (Last 24 hours) at 7/10/2025 0859  Last data filed at 7/10/2025 0500  Gross per 24 hour   Intake 620 ml   Output 175 ml   Net 445 ml       Physical Exam  Vitals and nursing note reviewed.   Constitutional:       General: She is not in acute distress.     Appearance: Normal appearance.   HENT:      Head: Normocephalic and atraumatic.      Mouth/Throat:      Mouth: Mucous membranes are moist.     Eyes:      Conjunctiva/sclera: Conjunctivae normal.      Pupils: Pupils are equal, round, and reactive to light.       Cardiovascular:      Rate and Rhythm: Normal rate and regular rhythm.      Pulses: Normal pulses.           Carotid pulses are 2+ on the right side and 2+ on the left side.       Radial pulses are 2+ on the right side and 2+ on the left side.      Heart sounds: Normal heart sounds, S1 normal and S2 normal.   Pulmonary:      Effort: No tachypnea, bradypnea or accessory muscle usage.      Breath sounds: Normal breath sounds and air entry. No decreased breath sounds, wheezing, rhonchi or rales.   Abdominal:      General: Abdomen is flat. There is no distension.      Palpations: Abdomen is soft.      Tenderness: There is no abdominal tenderness.     Musculoskeletal:      Right lower leg: No edema.      Left lower leg: No edema.     Neurological:      Mental Status: She is alert and oriented to person, place, and time. Mental status is at baseline.      GCS: GCS eye subscore is 4. GCS verbal subscore is 5. GCS motor subscore is 6.      Cranial Nerves: Cranial nerves 2-12 are intact.      Motor: Motor function is intact.     Psychiatric:         Mood and Affect: Mood normal.         Behavior: Behavior normal.            Lines/Drains:        Telemetry:  Telemetry Orders (From admission, onward)               24 Hour Telemetry Monitoring  Continuous x 24 Hours (Telem)        Question:  Reason for 24 Hour Telemetry  Answer:  TIA/Suspected CVA/ Confirmed CVA                     Telemetry Reviewed: Normal Sinus Rhythm  Indication for Continued Telemetry Use: Acute CVA               Lab Results: I have reviewed the following results:   Results from last 7 days   Lab Units 07/10/25  0643   WBC Thousand/uL 5.67   HEMOGLOBIN g/dL 11.2*   HEMATOCRIT % 34.5*   PLATELETS Thousands/uL 217   SEGS PCT % 70   LYMPHO PCT % 19   MONO PCT % 9   EOS PCT % 1     Results from last 7 days   Lab Units 07/10/25  0643   SODIUM mmol/L 139   POTASSIUM mmol/L 3.8   CHLORIDE mmol/L 105   CO2 mmol/L 28   BUN mg/dL 13   CREATININE mg/dL 0.73   ANION GAP mmol/L 6   CALCIUM mg/dL 9.3   ALBUMIN g/dL 3.5   TOTAL BILIRUBIN mg/dL 0.45   ALK PHOS U/L 53   ALT U/L 7   AST U/L 14   GLUCOSE RANDOM mg/dL 91             Results from last 7 days   Lab Units 07/09/25  1048   HEMOGLOBIN A1C % 5.5           Recent Cultures (last 7 days):         Imaging Results Review: No pertinent imaging studies reviewed.  Other Study Results Review: EKG was reviewed.     Last 24 Hours Medication List:     Current Facility-Administered Medications:     acetaminophen (TYLENOL) tablet 650 mg, Q6H PRN    amLODIPine (NORVASC) tablet 10 mg, Daily    apixaban (ELIQUIS) tablet 2.5 mg, BID    aspirin chewable tablet 81 mg, Daily    atorvastatin (LIPITOR) tablet 40 mg, Daily With Dinner    diphenhydrAMINE (BENADRYL) injection 25 mg, Q6H PRN    hydrALAZINE (APRESOLINE) injection 5 mg, Q6H PRN    hydroxyurea (HYDREA) capsule 500 mg, Daily    levothyroxine tablet 75 mcg, Early Morning    metoprolol succinate (TOPROL-XL) 24 hr tablet 50 mg, BID    pantoprazole (PROTONIX) EC tablet 40 mg, Daily Before Breakfast    Administrative Statements   Today, Patient Was Seen By: Esther Farr MD  I have  spent a total time of 30 minutes in caring for this patient on the day of the visit/encounter including Diagnostic results, Prognosis, Risks and benefits of tx options, and Instructions for management.    **Please Note: This note may have been constructed using a voice recognition system.**

## 2025-07-10 NOTE — ASSESSMENT & PLAN NOTE
CT: 1. Destructive/lytic lesion involving the posterior superior left acetabulum and posteromedial left iliac bone and mixed sclerotic and lucent disease further superiorly in the left iliac bone. Findings are most suspicious for a metastatic lesion with   less likely possibilities of multiple myeloma or primary neoplasm of bone.      - Scheduled for pulm biopsy on 7/10/2025.  Aspirin and Eliquis were held by PCP for several days  Restarted on admission with concern for stroke. Will have to postpone biopsy

## 2025-07-10 NOTE — CASE MANAGEMENT
Case Management Assessment & Discharge Planning Note    Patient name Blank Mensah  Location [address] [facility]/[address] [facility]-* MRN [id number]  : 3/16/1934 Date 7/10/2025       Current Admission Date: 2025  Current Admission Diagnosis:Stroke-like symptoms   Patient Active Problem List    Diagnosis Date Noted    Diverticulosis 07/10/2025    Lytic bone lesion of hip 07/10/2025    Stroke-like symptoms 2025    Atrial fibrillation (HCC) 2025    Migraine 2025    Vitamin B12 deficiency 2024    S/P placement of cardiac pacemaker 2024    Anemia 2024    Essential thrombocytosis (HCC) 2024    Mixed hyperlipidemia 02/10/2021    Gastroesophageal reflux disease without esophagitis 02/10/2021    Bilateral carotid artery stenosis 10/16/2020    Leukocytosis 10/05/2020    TIA (transient ischemic attack) 10/04/2020    Trouble breathing     Paroxysmal SVT (supraventricular tachycardia) (HCC) 2018    Acquired hypothyroidism     Accelerated hypertension     Anxiety       LOS (days): 0  Geometric Mean LOS (GMLOS) (days):   Days to GMLOS:     OBJECTIVE:         Current admission status: Observation  Referral Reason: Stroke, Other (Discharge planning)    Preferred Pharmacy:   [facility] [address] [place] [address] - [address]  [address]  [facility] [place] [address]  Phone: [phone] Fax: [phone]    Primary Care Provider: Andreia Barba DO    Primary Insurance: MEDICARE  Secondary Insurance: [employer]ET[employer]    ASSESSMENT:  Active Health Care Proxies    There are no active Health Care Proxies on file.        Obs Notice Signed: 07/10/25 (Notice reviewed with and signed by patient.  Copy given to patient and copy placed in scan bin for chart.)    Readmission Root Cause  30 Day Readmission: No    Patient Information  Admitted from:: Home  Mental Status: Alert  During Assessment patient was accompanied by: Daughter (Daughters Padmini and Tiffanie at bedside)  Assessment information provided by::  Patient  Primary Caregiver: Self  Support Systems: Daughter, Family members  County of Residence: [place]  What [facility] do you live in?: [place]  Home entry access options. Select all that apply.: Stairs  Number of steps to enter home.: 5  Do the steps have railings?: Yes  Type of Current Residence: [facility]  Living Arrangements: Lives Alone (Daughters live nearby and visit often)    Activities of Daily Living Prior to Admission  Functional Status: Independent  Completes ADLs independently?: Yes  Ambulates independently?: Yes  Does patient use assisted devices?: No  Does patient currently own DME?: Yes  What DME does the patient currently own?: Straight Cane, Walker  Does patient have a history of Outpatient Therapy (PT/OT)?: Yes  Does the patient have a history of Short-Term Rehab?: No  Does patient have a history of HHC?: No  Does patient currently have HHC?: No     Patient Information Continued  Income Source: Pension/long term  Does patient have prescription coverage?: Yes  Can the patient afford their medications and any related supplies (such as glucometers or test strips)?: Yes  Does patient receive dialysis treatments?: No  Does patient have a history of substance abuse?: No  Does patient have a history of Mental Health Diagnosis?: No     Means of Transportation  Means of Transport to Appts:: Family transport      DISCHARGE DETAILS:    Discharge planning discussed with:: Patient and daughters  Freedom of Choice: Yes    Comments - Freedom of Choice: FRANCOIS spoke with patient and daughters Yoel at bedside to introduce role of CM, conduct assessment and discuss discharge planning.  Patient lives alone and her daughters visit often and are supportive.  Her preference is to return home when medically cleared and her daughter will transport her home.      Patient is assessed at Level III for rehabilitation needs and HHC is recommended but patient declined referrals.  She is agreeable to outpatient PT/OT  referral and SW requested same from provider.  At this time there are no rehabilitation or CM needs anticipated for discharge and SW will continue to follow.      CM contacted family/caregiver?: Yes  Were Treatment Team discharge recommendations reviewed with patient/caregiver?: Yes  Did patient/caregiver verbalize understanding of patient care needs?: Yes  Were patient/caregiver advised of the risks associated with not following Treatment Team discharge recommendations?: Yes    Contacts  Patient Contacts: Padmini Norman (daughter)  Relationship to Patient:: Family  Contact Method: In Person  Reason/Outcome: Emergency Contact, Discharge Planning    Requested Home Health Care         Is the patient interested in HHC at discharge?: No    DME Referral Provided  Referral made for DME?: No       Would you like to participate in our [facility] service program?  : No - Declined    Treatment Team Recommendation: Home with Home Health Care  Expected Discharge Disposition: Home or Self Care  Additional Discharge Dispositions: Outpatient Rehab  Transport at Discharge : Family

## 2025-07-10 NOTE — PLAN OF CARE
Problem: PAIN - ADULT  Goal: Verbalizes/displays adequate comfort level or baseline comfort level  Description: Interventions:  - Encourage patient to monitor pain and request assistance  - Assess pain using appropriate pain scale  - Administer analgesics as ordered based on type and severity of pain and evaluate response  - Implement non-pharmacological measures as appropriate and evaluate response  - Consider cultural and social influences on pain and pain management  - Notify physician/advanced practitioner if interventions unsuccessful or patient reports new pain  - Educate patient/family on pain management process including their role and importance of  reporting pain   - Provide non-pharmacologic/complimentary pain relief interventions  Outcome: Progressing     Problem: INFECTION - ADULT  Goal: Absence or prevention of progression during hospitalization  Description: INTERVENTIONS:  - Assess and monitor for signs and symptoms of infection  - Monitor lab/diagnostic results  - Monitor all insertion sites, i.e. indwelling lines, tubes, and drains  - Monitor endotracheal if appropriate and nasal secretions for changes in amount and color  - [place] appropriate cooling/warming therapies per order  - Administer medications as ordered  - Instruct and encourage patient and family to use good hand hygiene technique  - Identify and instruct in appropriate isolation precautions for identified infection/condition  Outcome: Progressing  Goal: Absence of fever/infection during neutropenic period  Description: INTERVENTIONS:  - Monitor WBC  - Perform strict hand hygiene  - Limit to healthy visitors only  - No plants, dried, fresh or silk flowers with mcneill in patient room  Outcome: Progressing     Problem: SAFETY ADULT  Goal: Patient will remain free of falls  Description: INTERVENTIONS:  - Educate patient/family on patient safety including physical limitations  - Instruct patient to call for assistance with activity   -  Consider consulting OT/PT to assist with strengthening/mobility based on AM PAC & JH-HLM score  - Consult OT/PT to assist with strengthening/mobility   - Keep Call bell within reach  - Keep bed low and locked with side rails adjusted as appropriate  - Keep care items and personal belongings within reach  - Initiate and maintain comfort rounds  - Make Fall Risk Sign visible to staff  - Offer Toileting every 2 Hours, in advance of need  - Initiate/Maintain bed/chair alarm  - Obtain necessary fall risk management equipment: bracelet/socks   - Apply yellow socks and bracelet for high fall risk patients  - Consider moving patient to room near nurses station  Outcome: Progressing  Goal: Maintain or return to baseline ADL function  Description: INTERVENTIONS:  -  Assess patient's ability to carry out ADLs; assess patient's baseline for ADL function and identify physical deficits which impact ability to perform ADLs (bathing, care of mouth/teeth, toileting, grooming, dressing, etc.)  - Assess/evaluate cause of self-care deficits   - Assess range of motion  - Assess patient's mobility; develop plan if impaired  - Assess patient's need for assistive devices and provide as appropriate  - Encourage maximum independence but intervene and supervise when necessary  - Involve family in performance of ADLs  - Assess for home care needs following discharge   - Consider OT consult to assist with ADL evaluation and planning for discharge  - Provide patient education as appropriate  - Monitor functional capacity and physical performance, use of AM PAC & JH-HLM   - Monitor gait, balance and fatigue with ambulation    Outcome: Progressing  Goal: Maintains/Returns to pre admission functional level  Description: INTERVENTIONS:  - Perform AM-PAC 6 Click Basic Mobility/ Daily Activity assessment daily.  - Set and communicate daily mobility goal to care team and patient/family/caregiver.   - Collaborate with rehabilitation services on mobility  goals if consulted  - Perform Range of Motion 12 times a day.  - Reposition patient every 2 hours.  - Dangle patient 3 times a day  - Stand patient 3 times a day  - Ambulate patient 3 times a day  - Out of bed to chair 3 times a day   - Out of bed for meals 3 times a day  - Out of bed for toileting  - Record patient progress and toleration of activity level   Outcome: Progressing     Problem: DISCHARGE PLANNING  Goal: Discharge to home or other facility with appropriate resources  Description: INTERVENTIONS:  - Identify barriers to discharge w/patient and caregiver  - Arrange for needed discharge resources and transportation as appropriate  - Identify discharge learning needs (meds, wound care, etc.)  - Arrange for interpretive services to assist at discharge as needed  - Refer to Case Management Department for coordinating discharge planning if the patient needs post-hospital services based on physician/advanced practitioner order or complex needs related to functional status, cognitive ability, or social support system  Outcome: Progressing     Problem: Knowledge Deficit  Goal: Patient/family/caregiver demonstrates understanding of disease process, treatment plan, medications, and discharge instructions  Description: Complete learning assessment and assess knowledge base.  Interventions:  - Provide teaching at level of understanding  - Provide teaching via preferred learning methods  Outcome: Progressing     Problem: Activity Intolerance/Impaired Mobility  Goal: Mobility/activity is maintained at optimum level for patient  Description: Interventions:  - Assess and monitor patient  barriers to mobility and need for assistive/adaptive devices.  - Assess patient's emotional response to limitations.  - Collaborate with interdisciplinary team and initiate plans and interventions as ordered.  - Encourage independent activity per ability.  - Maintain proper body alignment.  - Perform active/passive rom as  tolerated/ordered.  - Plan activities to conserve energy.  - Turn patient as appropriate  Outcome: Progressing     Problem: Potential for Aspiration  Goal: Non-ventilated patient's risk of aspiration is minimized  Description: Assess and monitor vital signs, respiratory status, and labs (WBC).  Monitor for signs of aspiration (tachypnea, cough, rales, wheezing, cyanosis, fever).    - Assess and monitor patient's ability to swallow.  - Place patient up in chair to eat if possible.  - HOB up at 90 degrees to eat if unable to get patient up into chair.  - Supervise patient during oral intake.   - Instruct patient/ family to take small bites.  - Instruct patient/ family to take small single sips when taking liquids.  - Follow patient-specific strategies generated by speech pathologist.  Outcome: Progressing     Problem: Nutrition  Goal: Nutrition/Hydration status is improving  Description: Monitor and assess patient's nutrition/hydration status for malnutrition (ex- brittle hair, bruises, dry skin, pale skin and conjunctiva, muscle wasting, smooth red tongue, and disorientation). Collaborate with interdisciplinary team and initiate plan and interventions as ordered.  Monitor patient's weight and dietary intake as ordered or per policy. Utilize nutrition screening tool and intervene per policy. Determine patient's food preferences and provide high-protein, high-caloric foods as appropriate.     - Assist patient with eating.  - Allow adequate time for meals.  - Encourage patient to take dietary supplement as ordered.  - Collaborate with clinical nutritionist.  - Include patient/family/caregiver in decisions related to nutrition.  Outcome: Progressing

## 2025-07-10 NOTE — PLAN OF CARE
Problem: PHYSICAL THERAPY ADULT  Goal: Performs mobility at highest level of function for planned discharge setting.  See evaluation for individualized goals.  Description: Treatment/Interventions: Elevations, LE strengthening/ROM, Functional transfer training, Gait training, Bed mobility, Equipment eval/education, Patient/family training, Therapeutic exercise, Endurance training, Spoke to nursing  Equipment Recommended:  (Pt has a cane and walker if needed at home.)       See flowsheet documentation for full assessment, interventions and recommendations.  Note: Prognosis: Good  Problem List: Decreased strength, Impaired balance, Decreased mobility, Impaired hearing  Assessment: Pt is 89 y.o. female seen for PT evaluation s/p admit to [facility] on 7/9/2025 w/ Stroke-like symptoms. PT consulted to assess pt's functional mobility and d/c needs. Order placed for PT eval and tx, w/ activity as tolerated order.  Co-morbidities affecting patient's physical performance include: htn, stroke like symptoms, migraine, TIA, anemia, anxiety.  Personal factors affecting patient at time of initial evaluation include: stairs to enter home, inability to navigate level surfaces without external assistance, and hearing impairments.     Prior to admission, patient was independent with functional mobility without assistive device and independent with ADLS.  Upon evaluation: Pt ambulated with hand held assist and with a walker x 100 feet each.      Please find objective findings from Physical Therapy assessment regarding body systems outlined above with impairments and limitations including impaired balance, decreased endurance, gait deviations, decreased activity tolerance, and fall risk.The Barthel Index was used as a functional outcome tool presenting with a score of Barthel Index Score: 50 today indicating marked limitations of functional mobility and ADLS.  Patient's clinical presentation is currently  unstable/unpredictable as seen in patient's presentation of increased fall risk, new onset of impairment of functional mobility, and new onset of weakness. Pt would benefit from continued Physical Therapy treatment to address deficits as defined above and maximize level of functional mobility.     As demonstrated by objective findings, the assigned level of complexity for this evaluation is high.The patient's AM-[facility] Basic Mobility Inpatient Short Form Raw Score is 20. A Raw score of greater than 16 suggests the patient may benefit from discharge to home. Please also refer to the recommendation of the Physical Therapist for safe discharge planning.        Rehab Resource Intensity Level, PT: III (Minimum Resource Intensity)    See flowsheet documentation for full assessment.

## 2025-07-11 ENCOUNTER — TELEPHONE (OUTPATIENT)
Age: OVER 89
End: 2025-07-11

## 2025-07-11 ENCOUNTER — TRANSITIONAL CARE MANAGEMENT (OUTPATIENT)
Dept: FAMILY MEDICINE CLINIC | Facility: CLINIC | Age: OVER 89
End: 2025-07-11

## 2025-07-11 NOTE — TELEPHONE ENCOUNTER
Patient's daughter, Padmini, called to schedule a Transitional Care Management appointment for patient.  She was discharged yesterday 7/10/25.  Informed her appointment had been scheduled already for 8/1/25.  They thought patient was supposed to be seen within 10 days or so.  They just want to make sure it is OK to wait until 8/1/25 for patient to see Dr Barba.  Please call daughter to advise.  Thank you

## 2025-07-11 NOTE — TELEPHONE ENCOUNTER
Dr Barba,    Her daughter would like reassurance that she can wait to see you for the 8/1 appt. . Trinity said she would only schedule with you and that you did not have any sooner appts. Let us know if you want to see her sooner and if you find a time to work her in. Thanks Velvet

## 2025-07-14 ENCOUNTER — TELEPHONE (OUTPATIENT)
Age: OVER 89
End: 2025-07-14

## 2025-07-14 ENCOUNTER — OFFICE VISIT (OUTPATIENT)
Dept: FAMILY MEDICINE CLINIC | Facility: CLINIC | Age: OVER 89
End: 2025-07-14
Payer: MEDICARE

## 2025-07-14 VITALS
DIASTOLIC BLOOD PRESSURE: 70 MMHG | BODY MASS INDEX: 21.35 KG/M2 | RESPIRATION RATE: 14 BRPM | WEIGHT: 113 LBS | HEART RATE: 66 BPM | TEMPERATURE: 97.3 F | OXYGEN SATURATION: 95 % | SYSTOLIC BLOOD PRESSURE: 116 MMHG

## 2025-07-14 DIAGNOSIS — I10 ESSENTIAL HYPERTENSION: ICD-10-CM

## 2025-07-14 DIAGNOSIS — M89.9 BONE LESION: ICD-10-CM

## 2025-07-14 DIAGNOSIS — I48.91 ATRIAL FIBRILLATION, UNSPECIFIED TYPE (HCC): Primary | ICD-10-CM

## 2025-07-14 DIAGNOSIS — K57.90 DIVERTICULOSIS: ICD-10-CM

## 2025-07-14 PROBLEM — R29.90 STROKE-LIKE SYMPTOMS: Status: RESOLVED | Noted: 2025-07-09 | Resolved: 2025-07-14

## 2025-07-14 PROCEDURE — 99495 TRANSJ CARE MGMT MOD F2F 14D: CPT | Performed by: FAMILY MEDICINE

## 2025-07-14 RX ORDER — ENOXAPARIN SODIUM 100 MG/ML
40 INJECTION SUBCUTANEOUS DAILY
Qty: 0.8 ML | Refills: 0 | Status: SHIPPED | OUTPATIENT
Start: 2025-07-14 | End: 2025-07-29 | Stop reason: SDUPTHER

## 2025-07-14 NOTE — PATIENT INSTRUCTIONS
Instructions for blood thinner for bone biopsy:  Hold aspirin for 7 days before biopsy   Hold Eliquis for 3 days prior to biopsy.  Take enoxaparin 40 mg shot once a day for the first 2 days off of Eliquis

## 2025-07-14 NOTE — ASSESSMENT & PLAN NOTE
Will have her use bridging therapy with Lovenox while off of Eliquis  See patient instructions  Orders:  •  enoxaparin (LOVENOX) 40 mg/0.4 mL; Inject 0.4 mL (40 mg total) under the skin in the morning Take daily for the first 2 days off of Eliquis

## 2025-07-14 NOTE — PROGRESS NOTES
Transition of Care Visit:  Name: Blank Mensah      : 3/16/1934      MRN: [id number]  Encounter Provider: Andreia Barba DO  Encounter Date: 2025   Encounter department: [facility]    Assessment & Plan  Atrial fibrillation, unspecified type (HCC)  Will have her use bridging therapy with Lovenox while off of Eliquis  See patient instructions  Orders:  •  enoxaparin (LOVENOX) 40 mg/0.4 mL; Inject 0.4 mL (40 mg total) under the skin in the morning Take daily for the first 2 days off of Eliquis    Bone lesion  Her family is going to reschedule her bone biopsy        Essential hypertension  Improved   Continue amlodipine 10 mg daily and metoprolol 50 mg twice daily       Diverticulosis  Having some mild left lower quadrant pain today  Advised to avoid raw fruit and vegetables  Continue Augmentin  Signs and symptoms of diverticulitis discussed            Return for October as scheduled .    History of Present Illness     Transitional Care Management Review:   Blank Mensah is a 89 y.o. female here for TCM follow up.     During the TCM phone call patient stated:  TCM Call (since 2025)     Date and time call was made  2025 10:29 AM    Hospital care reviewed  Records reviewed    Patient was hospitialized at  [facility]    Date of Admission  25    Date of discharge  07/10/25    Diagnosis  Stroke-like symptoms    Disposition  Home    Were the patients medications reviewed and updated  Yes    Current Symptoms  None      TCM Call (since 2025)     Post hospital issues  None    Scheduled for follow up?  Yes    Referrals needed  Referral to Occupational Therapy , Referral to Physical Therapy    Did you obtain your prescribed medications  Yes    Do you need help managing your prescriptions or medications  No    Is transportation to your appointment needed  No    I have advised the patient to call PCP with any new or worsening symptoms  Trinity Shi Cma     Living Arrangements   Alone    Support System  Family; Friends; Children    The type of support provided  Emotional; Physical    Do you have social support  Yes, as much as I need    Are you recieving home care services  No        Patient here today for follow-up of a hospital stay.  She had strokelike symptoms.  Her symptoms have resolved but now she is just feeling fatigued.  She has been taking her Augmentin for her sinus infection.  She is concerned about her upcoming bone biopsy.      Review of Systems  Objective   /70   Pulse 66   Temp (!) 97.3 °F (36.3 °C)   Resp 14   Wt 51.3 kg (113 lb)   SpO2 95%   BMI 21.35 kg/m²     Physical Exam  Vitals and nursing note reviewed.   Constitutional:       General: She is not in acute distress.     Appearance: She is well-developed.   HENT:      Head: Normocephalic and atraumatic.     Eyes:      Conjunctiva/sclera: Conjunctivae normal.       Cardiovascular:      Rate and Rhythm: Normal rate and regular rhythm.      Heart sounds: No murmur heard.  Pulmonary:      Effort: Pulmonary effort is normal. No respiratory distress.      Breath sounds: Normal breath sounds.   Abdominal:      Palpations: Abdomen is soft.      Tenderness: There is abdominal tenderness (mild left lower quadrant).     Musculoskeletal:         General: No swelling.      Cervical back: Neck supple.     Skin:     General: Skin is warm and dry.      Capillary Refill: Capillary refill takes less than 2 seconds.     Neurological:      Mental Status: She is alert.     Psychiatric:         Mood and Affect: Mood normal.       Medications have been reviewed by provider in current encounter

## 2025-07-14 NOTE — ASSESSMENT & PLAN NOTE
Having some mild left lower quadrant pain today  Advised to avoid raw fruit and vegetables  Continue Augmentin  Signs and symptoms of diverticulitis discussed

## 2025-07-14 NOTE — TELEPHONE ENCOUNTER
1ST ATTEMPT,     VIA [employer]     Thank you,     Rosa       U/ HIRAM KIRKLAND/ Stroke-like symptoms     DC- 7/10/2025- HOME    ----- Message from ÁNGEL Alonso sent at 7/11/2025  5:27 PM EDT -----  Regarding: hfu  Follow up need is unclear.  Appears it may have been sinus related and treatment was provided.  Pt can follow up in 8-10 weeks with general neurology if she is still have symptoms after having sinus treatment.

## 2025-07-25 NOTE — PROGRESS NOTES
Hearing Aid Visit:    Name:  Jeanne Flynn  :  1934  Age:  80 y o  Date of Evaluation: 18     Jeanne Flynn is being seen for a hearing aid visit  Jeanne Flynn   is fit binaurally with [employer] Opn 3 hearing aid(s) serial number [id number] right/ serial number [id number] left  Warranty expires 2021  Patient reports she notices improved hearing when she pushes the hearing aids further into her canals  She also notes a slightly "lispy" quality to others' speech  Action:  Took earmold impressions for micromolds that will sit deeper into Mrs Mensah's narrow canals  Sent to [employer]  Advised Mrs Mensah that we will hold off on adjustments to her settings until the micromolds come in  Recommendations: Will call patient to schedule HA follow-up when micromolds are in from [facility] [address]         Charla Vail, Audiology Intern  [facility] , [address]  Clinical Audiologist We did recommend admission but at this time you are refusing.    If you develop any worsening symptoms with persistent vomiting with blood, new onset chest pain or shortness of breath, lightheadedness with passing out, severe headache, or any other concerns over the next 12 to 24 hours, then return to the emergency department immediate for further evaluation.

## 2025-07-29 ENCOUNTER — TELEPHONE (OUTPATIENT)
Dept: FAMILY MEDICINE CLINIC | Facility: CLINIC | Age: OVER 89
End: 2025-07-29

## 2025-07-29 DIAGNOSIS — I48.91 ATRIAL FIBRILLATION, UNSPECIFIED TYPE (HCC): ICD-10-CM

## 2025-07-29 RX ORDER — ENOXAPARIN SODIUM 100 MG/ML
40 INJECTION SUBCUTANEOUS DAILY
Qty: 0.8 ML | Refills: 0 | Status: SHIPPED | OUTPATIENT
Start: 2025-07-29

## 2025-07-31 ENCOUNTER — REMOTE DEVICE CLINIC VISIT (OUTPATIENT)
Dept: CARDIOLOGY CLINIC | Facility: CLINIC | Age: OVER 89
End: 2025-07-31
Payer: MEDICARE

## 2025-07-31 DIAGNOSIS — Z95.0 PRESENCE OF PERMANENT CARDIAC PACEMAKER: Primary | ICD-10-CM

## 2025-07-31 PROCEDURE — 93296 REM INTERROG EVL PM/IDS: CPT | Performed by: INTERNAL MEDICINE

## 2025-07-31 PROCEDURE — 93294 REM INTERROG EVL PM/LDLS PM: CPT | Performed by: INTERNAL MEDICINE

## 2025-08-08 ENCOUNTER — HOSPITAL ENCOUNTER (OUTPATIENT)
Dept: RADIOLOGY | Facility: HOSPITAL | Age: OVER 89
Discharge: HOME | End: 2025-08-08
Attending: RADIOLOGY
Payer: MEDICARE

## 2025-08-08 VITALS
WEIGHT: 112.66 LBS | OXYGEN SATURATION: 99 % | BODY MASS INDEX: 21.27 KG/M2 | TEMPERATURE: 97.2 F | HEIGHT: 61 IN | DIASTOLIC BLOOD PRESSURE: 66 MMHG | HEART RATE: 61 BPM | RESPIRATION RATE: 18 BRPM | SYSTOLIC BLOOD PRESSURE: 158 MMHG

## 2025-08-08 DIAGNOSIS — M89.9 BONE LESION: ICD-10-CM

## 2025-08-08 LAB
ERYTHROCYTE [DISTWIDTH] IN BLOOD BY AUTOMATED COUNT: 15.8 % (ref 11.6–15.1)
HCT VFR BLD AUTO: 37.4 % (ref 34.8–46.1)
HGB BLD-MCNC: 12.1 G/DL (ref 11.5–15.4)
INR PPP: 0.93 (ref 0.85–1.19)
MCH RBC QN AUTO: 39.7 PG (ref 26.8–34.3)
MCHC RBC AUTO-ENTMCNC: 32.4 G/DL (ref 31.4–37.4)
MCV RBC AUTO: 123 FL (ref 82–98)
PLATELET # BLD AUTO: 233 THOUSANDS/UL (ref 149–390)
PMV BLD AUTO: 10.2 FL (ref 8.9–12.7)
PROTHROMBIN TIME: 12.9 SECONDS (ref 12.3–15)
RBC # BLD AUTO: 3.05 MILLION/UL (ref 3.81–5.12)
WBC # BLD AUTO: 5.75 THOUSAND/UL (ref 4.31–10.16)

## 2025-08-08 PROCEDURE — 20225 BONE BIOPSY TROCAR/NDL DEEP: CPT

## 2025-08-08 PROCEDURE — 99152 MOD SED SAME PHYS/QHP 5/>YRS: CPT

## 2025-08-08 PROCEDURE — 85610 PROTHROMBIN TIME: CPT | Performed by: RADIOLOGY

## 2025-08-08 PROCEDURE — 85027 COMPLETE CBC AUTOMATED: CPT | Performed by: RADIOLOGY

## 2025-08-08 PROCEDURE — 99153 MOD SED SAME PHYS/QHP EA: CPT

## 2025-08-08 RX ORDER — FENTANYL CITRATE 50 UG/ML
INJECTION, SOLUTION INTRAMUSCULAR; INTRAVENOUS AS NEEDED
Status: COMPLETED | OUTPATIENT
Start: 2025-08-08 | End: 2025-08-08

## 2025-08-08 RX ORDER — LIDOCAINE WITH 8.4% SOD BICARB 0.9%(10ML)
SYRINGE (ML) INJECTION AS NEEDED
Status: COMPLETED | OUTPATIENT
Start: 2025-08-08 | End: 2025-08-08

## 2025-08-08 RX ORDER — MIDAZOLAM HYDROCHLORIDE 2 MG/2ML
INJECTION, SOLUTION INTRAMUSCULAR; INTRAVENOUS AS NEEDED
Status: COMPLETED | OUTPATIENT
Start: 2025-08-08 | End: 2025-08-08

## 2025-08-08 RX ADMIN — Medication 10 ML: at 09:49

## 2025-08-08 RX ADMIN — FENTANYL CITRATE 50 MCG: 50 INJECTION, SOLUTION INTRAMUSCULAR; INTRAVENOUS at 09:44

## 2025-08-08 RX ADMIN — MIDAZOLAM 0.5 MG: 1 INJECTION INTRAMUSCULAR; INTRAVENOUS at 09:44

## 2025-08-09 PROBLEM — J01.90 ACUTE SINUSITIS: Status: RESOLVED | Noted: 2025-07-10 | Resolved: 2025-08-09

## 2025-08-21 ENCOUNTER — TELEPHONE (OUTPATIENT)
Age: OVER 89
End: 2025-08-21

## 2025-08-22 ENCOUNTER — OFFICE VISIT (OUTPATIENT)
Dept: FAMILY MEDICINE CLINIC | Facility: CLINIC | Age: OVER 89
End: 2025-08-22
Payer: MEDICARE

## 2025-08-22 VITALS
BODY MASS INDEX: 21.34 KG/M2 | TEMPERATURE: 97.5 F | DIASTOLIC BLOOD PRESSURE: 68 MMHG | HEART RATE: 78 BPM | HEIGHT: 61 IN | SYSTOLIC BLOOD PRESSURE: 108 MMHG | WEIGHT: 113 LBS | RESPIRATION RATE: 18 BRPM

## 2025-08-22 DIAGNOSIS — N39.0 ACUTE UTI: Primary | ICD-10-CM

## 2025-08-22 LAB
SL AMB  POCT GLUCOSE, UA: ABNORMAL
SL AMB LEUKOCYTE ESTERASE,UA: ABNORMAL
SL AMB POCT BILIRUBIN,UA: ABNORMAL
SL AMB POCT BLOOD,UA: ABNORMAL
SL AMB POCT CLARITY,UA: CLEAR
SL AMB POCT COLOR,UA: YELLOW
SL AMB POCT KETONES,UA: ABNORMAL
SL AMB POCT NITRITE,UA: ABNORMAL
SL AMB POCT PH,UA: 5.5
SL AMB POCT SPECIFIC GRAVITY,UA: 1.03
SL AMB POCT URINE PROTEIN: 30
SL AMB POCT UROBILINOGEN: 0.2

## 2025-08-22 PROCEDURE — 99213 OFFICE O/P EST LOW 20 MIN: CPT | Performed by: NURSE PRACTITIONER

## 2025-08-22 PROCEDURE — 81003 URINALYSIS AUTO W/O SCOPE: CPT | Performed by: NURSE PRACTITIONER

## 2025-08-22 RX ORDER — CEPHALEXIN 500 MG/1
500 CAPSULE ORAL EVERY 12 HOURS SCHEDULED
Qty: 14 CAPSULE | Refills: 0 | Status: SHIPPED | OUTPATIENT
Start: 2025-08-22 | End: 2025-08-29

## 2025-08-23 LAB — BACTERIA UR CULT: NORMAL

## (undated) DEVICE — DRESSING AQUACEL AG 3.5 X 6 IN

## (undated) DEVICE — 3M™ IOBAN™ 2 ANTIMICROBIAL INCISE DRAPE 6650EZ: Brand: IOBAN™ 2

## (undated) DEVICE — SLITTER ADJUSTABLE

## (undated) DEVICE — IMMOBILIZER SHOULDER UNIVERAL

## (undated) DEVICE — DGW .035 FC J3MM 150CM TEF: Brand: EMERALD

## (undated) DEVICE — X-RAY DETECTABLE SPONGES,16 PLY: Brand: VISTEC

## (undated) DEVICE — PACK CCL/IR MINOR PROCEDURE

## (undated) DEVICE — CATH GUIDING FIXED SHAPE 43CM

## (undated) DEVICE — INTRO SHEATH SAFE 7FR ULTRA TEAR AWAY